# Patient Record
Sex: FEMALE | Race: BLACK OR AFRICAN AMERICAN | NOT HISPANIC OR LATINO | Employment: OTHER | URBAN - METROPOLITAN AREA
[De-identification: names, ages, dates, MRNs, and addresses within clinical notes are randomized per-mention and may not be internally consistent; named-entity substitution may affect disease eponyms.]

---

## 2017-05-24 ENCOUNTER — OFFICE VISIT (OUTPATIENT)
Dept: RHEUMATOLOGY | Facility: CLINIC | Age: 72
End: 2017-05-24
Payer: MEDICARE

## 2017-05-24 VITALS
BODY MASS INDEX: 38.19 KG/M2 | SYSTOLIC BLOOD PRESSURE: 138 MMHG | HEIGHT: 64 IN | DIASTOLIC BLOOD PRESSURE: 82 MMHG | WEIGHT: 223.69 LBS

## 2017-05-24 DIAGNOSIS — Z79.899 ENCOUNTER FOR LONG-TERM CURRENT USE OF MEDICATION: ICD-10-CM

## 2017-05-24 DIAGNOSIS — M15.9 OSTEOARTHRITIS, GENERALIZED: Primary | ICD-10-CM

## 2017-05-24 PROCEDURE — 3075F SYST BP GE 130 - 139MM HG: CPT | Mod: ,,, | Performed by: INTERNAL MEDICINE

## 2017-05-24 PROCEDURE — 1159F MED LIST DOCD IN RCRD: CPT | Mod: ,,, | Performed by: INTERNAL MEDICINE

## 2017-05-24 PROCEDURE — 1160F RVW MEDS BY RX/DR IN RCRD: CPT | Mod: ,,, | Performed by: INTERNAL MEDICINE

## 2017-05-24 PROCEDURE — 3079F DIAST BP 80-89 MM HG: CPT | Mod: ,,, | Performed by: INTERNAL MEDICINE

## 2017-05-24 PROCEDURE — 99213 OFFICE O/P EST LOW 20 MIN: CPT | Mod: 25,,, | Performed by: INTERNAL MEDICINE

## 2017-05-24 PROCEDURE — 20610 DRAIN/INJ JOINT/BURSA W/O US: CPT | Mod: ,,, | Performed by: INTERNAL MEDICINE

## 2017-05-24 PROCEDURE — 1125F AMNT PAIN NOTED PAIN PRSNT: CPT | Mod: ,,, | Performed by: INTERNAL MEDICINE

## 2017-05-24 RX ORDER — DEXAMETHASONE SODIUM PHOSPHATE 4 MG/ML
2 INJECTION, SOLUTION INTRA-ARTICULAR; INTRALESIONAL; INTRAMUSCULAR; INTRAVENOUS; SOFT TISSUE
Status: DISCONTINUED | OUTPATIENT
Start: 2017-05-24 | End: 2017-05-24 | Stop reason: HOSPADM

## 2017-05-24 RX ORDER — DEXAMETHASONE SODIUM PHOSPHATE 4 MG/ML
4 INJECTION, SOLUTION INTRA-ARTICULAR; INTRALESIONAL; INTRAMUSCULAR; INTRAVENOUS; SOFT TISSUE
Status: DISCONTINUED | OUTPATIENT
Start: 2017-05-24 | End: 2017-05-24 | Stop reason: HOSPADM

## 2017-05-24 RX ORDER — TRIAMCINOLONE ACETONIDE 40 MG/ML
40 INJECTION, SUSPENSION INTRA-ARTICULAR; INTRAMUSCULAR
Status: DISCONTINUED | OUTPATIENT
Start: 2017-05-24 | End: 2017-05-24 | Stop reason: HOSPADM

## 2017-05-24 RX ADMIN — TRIAMCINOLONE ACETONIDE 40 MG: 40 INJECTION, SUSPENSION INTRA-ARTICULAR; INTRAMUSCULAR at 10:05

## 2017-05-24 RX ADMIN — DEXAMETHASONE SODIUM PHOSPHATE 4 MG: 4 INJECTION, SOLUTION INTRA-ARTICULAR; INTRALESIONAL; INTRAMUSCULAR; INTRAVENOUS; SOFT TISSUE at 10:05

## 2017-05-24 RX ADMIN — DEXAMETHASONE SODIUM PHOSPHATE 2 MG: 4 INJECTION, SOLUTION INTRA-ARTICULAR; INTRALESIONAL; INTRAMUSCULAR; INTRAVENOUS; SOFT TISSUE at 10:05

## 2017-05-24 NOTE — PROGRESS NOTES
Subjective:       Patient ID: Tierra Taylor is a 71 y.o. female.    Chief Complaint: Disease Management (follow up) and Pain (9/10 pain in both knees)  Follow-up for osteoarthritis.  Patient complaining of pain in both knees in the medial aspect of her knees. No swelling. No fevers. No history of trauma. No other joint complaints. No chest pains, no cough, no shortness of breath or GI complaints. No paresthesias.  HPI  Review of Systems    Objective:      Physical Exam   Constitutional: She is oriented to person, place, and time. She appears well-developed.   Eyes: No scleral icterus.   Neck: Normal range of motion. Neck supple. No thyromegaly present.   Cardiovascular: Normal rate, regular rhythm and normal heart sounds.    Pulmonary/Chest: Effort normal and breath sounds normal.   Abdominal: Soft. Bowel sounds are normal.   Musculoskeletal: She exhibits tenderness. She exhibits no edema.   Marked tenderness in both anserine bursae. No joint swelling.   Neurological: She is alert and oriented to person, place, and time. She exhibits normal muscle tone.   Skin: Skin is warm and dry. No rash noted. No erythema. No pallor.   Psychiatric: She has a normal mood and affect. Judgment normal.       Assessment:       1. Osteoarthritis, generalized    2. Encounter for long-term current use of medication      Bilateral anserine bursitis  Plan:     injected both anserine bursa with 0.5 cc Kenalog and 0.5 cc dexamethasone.  X-ray of both knees. Continue present medications. Follow-up in 4 months if doing okay or before as needed.

## 2017-05-24 NOTE — PROCEDURES
Large Joint Aspiration/Injection  Date/Time: 5/24/2017 10:42 AM  Performed by: KHLOE CRISTOBAL  Authorized by: KHLOE CRISTOBAL     Consent Done?:  Not Needed    Location:  Knee  Site:  R knee and L knee  Prep: Patient was prepped and draped in usual sterile fashion    Medications:  2 mg dexamethasone 4 mg/mL; 40 mg triamcinolone acetonide 40 mg/mL; 4 mg dexamethasone 4 mg/mL (.5 cc)  Patient tolerance:  Patient tolerated the procedure well with no immediate complications      Anserine Bursa injected as above,bilaterally

## 2017-05-24 NOTE — PROCEDURES
Large Joint Aspiration/Injection  Date/Time: 5/24/2017 10:13 AM  Performed by: KHLOE CRISTOBAL  Authorized by: KHLOE CRISTOBAL     Consent Done?:  Not Needed  Prep: Patient was prepped and draped in usual sterile fashion    Medications:  40 mg triamcinolone acetonide 40 mg/mL; 2 mg dexamethasone 4 mg/mL  Patient tolerance:  Patient tolerated the procedure well with no immediate complications

## 2017-05-24 NOTE — LETTER
May 24, 2017      Sofie Leonardo MD  [address]  [address]  [facility]  [place] [address]           [facility]umat[facility]  [address]  [address]  [place] [address]  Phone: [phone]  Fax: [phone]          Patient: Tierra Taylor   MR Number: [id number]   YOB: 1945   Date of Visit: 5/24/2017       Dear Dr. Sofie Leonardo:    Thank you for referring Tierra Taylor to me for evaluation. Attached you will find relevant portions of my assessment and plan of care.    If you have questions, please do not hesitate to call me. I look forward to following Tierra Taylor along with you.    Sincerely,    Cholo Eldridge MD    Enclosure  CC:  No Recipients    If you would like to receive this communication electronically, please contact [email or web address] or [phone] to request more information on [employer] access.    For providers and/or their staff who would like to refer a patient to [facility], please contact us through our one-stop-shop provider referral line, [facility] [place], at [phone].    If you feel you have received this communication in error or would no longer like to receive these types of communications, please e-mail [email or web address]

## 2017-09-27 ENCOUNTER — OFFICE VISIT (OUTPATIENT)
Dept: RHEUMATOLOGY | Facility: CLINIC | Age: 72
End: 2017-09-27
Payer: MEDICARE

## 2017-09-27 VITALS
BODY MASS INDEX: 38.16 KG/M2 | WEIGHT: 222.31 LBS | DIASTOLIC BLOOD PRESSURE: 80 MMHG | SYSTOLIC BLOOD PRESSURE: 120 MMHG

## 2017-09-27 DIAGNOSIS — M15.9 OSTEOARTHRITIS, GENERALIZED: Primary | ICD-10-CM

## 2017-09-27 PROCEDURE — 99213 OFFICE O/P EST LOW 20 MIN: CPT | Mod: ,,, | Performed by: INTERNAL MEDICINE

## 2017-09-27 PROCEDURE — 3079F DIAST BP 80-89 MM HG: CPT | Mod: ,,, | Performed by: INTERNAL MEDICINE

## 2017-09-27 PROCEDURE — 3074F SYST BP LT 130 MM HG: CPT | Mod: ,,, | Performed by: INTERNAL MEDICINE

## 2017-09-27 PROCEDURE — 3008F BODY MASS INDEX DOCD: CPT | Mod: ,,, | Performed by: INTERNAL MEDICINE

## 2017-09-27 PROCEDURE — 1159F MED LIST DOCD IN RCRD: CPT | Mod: ,,, | Performed by: INTERNAL MEDICINE

## 2017-09-27 RX ORDER — DULOXETIN HYDROCHLORIDE 60 MG/1
CAPSULE, DELAYED RELEASE ORAL
COMMUNITY
Start: 2017-01-15 | End: 2019-06-17

## 2017-09-27 NOTE — PROGRESS NOTES
[facility] RHEUMATOLOGY            PROGRESS NOTE      Subjective:       Patient ID:   NAME: Tierra Taylor : 1945     71 y.o. female    Referring Doc: No ref. provider found  Other Physicians:    Chief Complaint:  Osteoarthritis (pain in knees and calf muscles)      History of Present Illness:     Patient returns today for a regularly scheduled follow-up visit for OA  Pain in knees.No swelling  Did not do labs as ordered                 ROS:   GEN:  No  fever, night sweats . weight is stable   + fatigue  SKIN: no rashes, no bruising, no ulcerations, no Raynaud's  HEENT: no HA's, No visual changes, no mucosal ulcers, no sicca symptoms,  CV:   no CP, SOB, PND, NEIL, no orthopnea, no palpitations  PULM: normal with no SOB, cough, hemoptysis, sputum or pleuritic pain  GI:  no abdominal pain, nausea, vomiting, constipation, diarrhea, melanotic stools, BRBPR, hematemesis, no dysphagia  :   no dysuria  NEURO: no paresthesias, headaches, visual disturbances, muscle weakness  MUSCULOSKELETAL:no joint swelling, prolonged AM stiffness, no back pain, no muscle pain  Allergies:  Review of patient's allergies indicates:   Allergen Reactions    Penicillins     Tramadol      causes itching       Medications:    Current Outpatient Prescriptions:     duloxetine (CYMBALTA) 60 MG capsule, Take by mouth., Disp: , Rfl:     aspirin 81 MG Chew, Take 81 mg by mouth once daily., Disp: , Rfl:     carvedilol (COREG) 25 MG tablet, Take 25 mg by mouth 2 (two) times daily with meals., Disp: , Rfl:     diclofenac sodium 2 % SoPk, Apply 2 Pump topically 2 (two) times daily., Disp: 1 packet, Rfl: 3    lisinopril (PRINIVIL,ZESTRIL) 20 MG tablet, Take 20 mg by mouth once daily., Disp: , Rfl:     mometasone (NASONEX) 50 mcg/actuation nasal spray, 2 sprays by Nasal route once daily., Disp: , Rfl:     simvastatin (ZOCOR) 40 MG tablet, Take 40 mg by mouth every evening., Disp: , Rfl:     triamterene-hydrochlorothiazide 75-50 mg  (MAXZIDE) 75-50 mg per tablet, Take 1 tablet by mouth once daily., Disp: , Rfl:     PMHx/PSHx Updates:    Objective:     Vitals:  Blood pressure 120/80, weight 100.8 kg (222 lb 4.8 oz).    Physical Examination:   GEN: no apparent distress, comfortable; AAOx3  SKIN: no rashes,no ulceration, no Raynaud's, no petechiae, no SQ nodules,  HEAD: normal  EYES: no pallor, no icterus,   NECK: no masses, thyroid normal, trachea midline, no LAD/LN's, supple  CV: RRR with no murmur; l S1 and S2 reg. ,no gallop no rubs,   CHEST: Normal respiratory effort; CTAB; normal breath sounds; no wheeze or crackles  ABDOM: nontender and nondistended; soft; no masses; no rebound/guarding  MUSC/Skeletal: ROM normal; no crepitus; joints without synovitis,  no deformities  No joint swelling or tenderness of PIP, MCP, wrist, elbow, shoulder, or knee joints  EXTREM: no clubbing, cyanosis, no edema,normal  pulses   NEURO: grossly intact; motor WNL; AAOx3; , PSYCH: normal mood, affect and behavior  LYMPH: normal cervical, supraclavicular          Labs:   Lab Results   Component Value Date    WBC 8.4 04/28/2013    HGB 12.3 04/28/2013    HCT 38.5 04/28/2013    MCV 96.2 04/28/2013     04/28/2013    CMP  @LASTLAB(NA,K,CL,CO2,GLU,BUN,Creatinine,Calcium,PROT,Albumin,Bilitot,Alkphos,AST,ALT,CRP,ESR,RF,CCP,LEISA,SSA,CPK,uric acid) )@  I have reviewed all available lab results and radiology reports.    Radiology/Diagnostic Studies:        Assessment/Plan:   (1) 71 y.o. female with diagnosis of OA More symptomatic knees,Did not do labs  PLAN: CBC,CMP,  Pennsaid liquid bid prn                Discussion:     I have explained all of the above in detail and the patient understands all of the current recommendation(s). I have answered all questions to the best of my ability and to their complete satisfaction.       The patient is to continue with the current management plan         RTC in   1 month       Electronically signed by Cholo Eldridge  MD

## 2017-10-24 LAB
ALBUMIN SERPL-MCNC: 3.9 G/DL (ref 3.6–5.1)
ALBUMIN/GLOB SERPL: 1.2 (CALC) (ref 1–2.5)
ALP SERPL-CCNC: 74 U/L (ref 33–130)
ALT SERPL-CCNC: 21 U/L (ref 6–29)
AST SERPL-CCNC: 22 U/L (ref 10–35)
BASOPHILS # BLD AUTO: 28 CELLS/UL (ref 0–200)
BASOPHILS NFR BLD AUTO: 0.5 %
BILIRUB SERPL-MCNC: 0.8 MG/DL (ref 0.2–1.2)
BUN SERPL-MCNC: 18 MG/DL (ref 7–25)
BUN/CREAT SERPL: NORMAL (CALC) (ref 6–22)
CALCIUM SERPL-MCNC: 9.3 MG/DL (ref 8.6–10.4)
CHLORIDE SERPL-SCNC: 102 MMOL/L (ref 98–110)
CO2 SERPL-SCNC: 31 MMOL/L (ref 20–31)
CREAT SERPL-MCNC: 0.8 MG/DL (ref 0.6–0.93)
CRP SERPL-MCNC: 2.9 MG/L
EOSINOPHIL # BLD AUTO: 121 CELLS/UL (ref 15–500)
EOSINOPHIL NFR BLD AUTO: 2.2 %
ERYTHROCYTE [DISTWIDTH] IN BLOOD BY AUTOMATED COUNT: 12.3 % (ref 11–15)
GFR SERPL CREATININE-BSD FRML MDRD: 73 ML/MIN/1.73M2
GLOBULIN SER CALC-MCNC: 3.2 G/DL (CALC) (ref 1.9–3.7)
GLUCOSE SERPL-MCNC: 90 MG/DL (ref 65–99)
HCT VFR BLD AUTO: 39.1 % (ref 35–45)
HGB BLD-MCNC: 12.7 G/DL (ref 11.7–15.5)
LYMPHOCYTES # BLD AUTO: 2151 CELLS/UL (ref 850–3900)
LYMPHOCYTES NFR BLD AUTO: 39.1 %
MCH RBC QN AUTO: 29.6 PG (ref 27–33)
MCHC RBC AUTO-ENTMCNC: 32.5 G/DL (ref 32–36)
MCV RBC AUTO: 91.1 FL (ref 80–100)
MONOCYTES # BLD AUTO: 550 CELLS/UL (ref 200–950)
MONOCYTES NFR BLD AUTO: 10 %
NEUTROPHILS # BLD AUTO: 2651 CELLS/UL (ref 1500–7800)
NEUTROPHILS NFR BLD AUTO: 48.2 %
PLATELET # BLD AUTO: 225 THOUSAND/UL (ref 140–400)
PMV BLD REES-ECKER: 11.6 FL (ref 7.5–12.5)
POTASSIUM SERPL-SCNC: 3.6 MMOL/L (ref 3.5–5.3)
PROT SERPL-MCNC: 7.1 G/DL (ref 6.1–8.1)
RBC # BLD AUTO: 4.29 MILLION/UL (ref 3.8–5.1)
SODIUM SERPL-SCNC: 141 MMOL/L (ref 135–146)
WBC # BLD AUTO: 5.5 THOUSAND/UL (ref 3.8–10.8)

## 2017-10-25 ENCOUNTER — OFFICE VISIT (OUTPATIENT)
Dept: RHEUMATOLOGY | Facility: CLINIC | Age: 72
End: 2017-10-25
Payer: MEDICARE

## 2017-10-25 VITALS
WEIGHT: 218 LBS | BODY MASS INDEX: 37.22 KG/M2 | HEIGHT: 64 IN | SYSTOLIC BLOOD PRESSURE: 162 MMHG | DIASTOLIC BLOOD PRESSURE: 68 MMHG

## 2017-10-25 DIAGNOSIS — M15.9 OSTEOARTHRITIS, GENERALIZED: Primary | ICD-10-CM

## 2017-10-25 PROCEDURE — 99212 OFFICE O/P EST SF 10 MIN: CPT | Mod: ,,, | Performed by: INTERNAL MEDICINE

## 2017-10-25 RX ORDER — SALSALATE 500 MG/1
TABLET, FILM COATED ORAL
Qty: 120 TABLET | Refills: 0 | Status: SHIPPED | OUTPATIENT
Start: 2017-10-25 | End: 2019-06-17

## 2017-10-25 NOTE — PROGRESS NOTES
[facility] RHEUMATOLOGY            PROGRESS NOTE      Subjective:       Patient ID:   NAME: Tierra Taylor : 1945     71 y.o. female    Referring Doc: No ref. provider found  Other Physicians:    Chief Complaint:  No chief complaint on file.      History of Present Illness:     Patient returns today for a regularly scheduled follow-up visit for    OA   The patient has same complaints of knee pain  Did not get Pennsaid due to the price  No new complaints  No respiratory or GI complaints            ROS:   GEN:  No  fever, night sweats . weight is stable   No fatigue  SKIN: no rashes, no bruising, no ulcerations, no Raynaud's  HEENT: no HA's, No visual changes, no mucosal ulcers, no sicca symptoms,  CV:   no CP, SOB, PND, NEIL, no orthopnea, no palpitations  PULM: normal with no SOB, cough, hemoptysis, sputum or pleuritic pain  GI:  no abdominal pain, nausea, vomiting, constipation, diarrhea, melanotic stools, BRBPR, hematemesis, no dysphagia  :   no dysuria  NEURO: no paresthesias, headaches, visual disturbances, muscle weakness  MUSCULOSKELETAL:no joint swelling, prolonged AM stiffness, no back pain, no muscle pain  Allergies:  Review of patient's allergies indicates:   Allergen Reactions    Penicillins     Tramadol      causes itching       Medications:    Current Outpatient Prescriptions:     aspirin 81 MG Chew, Take 81 mg by mouth once daily., Disp: , Rfl:     carvedilol (COREG) 25 MG tablet, Take 25 mg by mouth 2 (two) times daily with meals., Disp: , Rfl:     diclofenac sodium 2 % SoPk, Apply 2 Pump topically 2 (two) times daily., Disp: 1 packet, Rfl: 3    duloxetine (CYMBALTA) 60 MG capsule, Take by mouth., Disp: , Rfl:     lisinopril (PRINIVIL,ZESTRIL) 20 MG tablet, Take 20 mg by mouth once daily., Disp: , Rfl:     mometasone (NASONEX) 50 mcg/actuation nasal spray, 2 sprays by Nasal route once daily., Disp: , Rfl:     simvastatin (ZOCOR) 40 MG tablet, Take 40 mg by mouth every evening.,  "Disp: , Rfl:     triamterene-hydrochlorothiazide 75-50 mg (MAXZIDE) 75-50 mg per tablet, Take 1 tablet by mouth once daily., Disp: , Rfl:     PMHx/PSHx Updates:      Objective:     Vitals:  Blood pressure (!) 162/68, height 5' 4" (1.626 m), weight 98.9 kg (218 lb).    Physical Examination:   GEN: no apparent distress, comfortable; AAOx3  SKIN: no rashes,no ulceration, no Raynaud's, no petechiae, no SQ nodules,  HEAD: normal  EYES: no pallor, no icterus,  NECK: no masses, thyroid normal, trachea midline, no LAD/LN's, supple  CV: RRR with no murmur; l S1 and S2 reg. ,no gallop no rubs,   CHEST: Normal respiratory effort; CTAB; normal breath sounds; no wheeze or crackles  MUSC/Skeletal: ROM normal; no crepitus; joints without synovitis,  no deformities  No joint swelling or tenderness of PIP, MCP, wrist, elbow, shoulder, or knee joints  EXTREM: no clubbing, cyanosis, no edema,normal  pulses   NEURO: grossly intact; motor WNL; AAOx3;  PSYCH: normal mood, affect and behavior  LYMPH: normal cervical, supraclavicular          Labs:   Lab Results   Component Value Date    WBC 8.4 04/28/2013    HGB 12.3 04/28/2013    HCT 38.5 04/28/2013    MCV 96.2 04/28/2013     04/28/2013    CMP  @LASTLAB(NA,K,CL,CO2,GLU,BUN,Creatinine,Calcium,PROT,Albumin,Bilitot,Alkphos,AST,ALT,CRP,ESR,RF,CCP,LEISA,SSA,CPK,uric acid) )@  I have reviewed all available lab results and radiology reports.    Radiology/Diagnostic Studies:    CBC,CMP,CRP from 10/23/2017:WNL    Assessment/Plan:   (1) 71 y.o. female with diagnosis of Osteoarthritis,mainly in knees    PLAN: Salsalate 500 mg : 2 BID-TID pc prn            Discussion:     I have explained all of the above in detail and the patient understands all of the current recommendation(s). I have answered all questions to the best of my ability and to their complete satisfaction.       The patient is to continue with the current management plan         RTC in  One month       Electronically signed by " Cholo Eldridge MD

## 2017-11-28 ENCOUNTER — OFFICE VISIT (OUTPATIENT)
Dept: RHEUMATOLOGY | Facility: CLINIC | Age: 72
End: 2017-11-28
Payer: MEDICARE

## 2017-11-28 VITALS
BODY MASS INDEX: 37.73 KG/M2 | HEIGHT: 64 IN | WEIGHT: 221 LBS | DIASTOLIC BLOOD PRESSURE: 88 MMHG | SYSTOLIC BLOOD PRESSURE: 160 MMHG

## 2017-11-28 DIAGNOSIS — M15.9 PRIMARY OSTEOARTHRITIS INVOLVING MULTIPLE JOINTS: Primary | ICD-10-CM

## 2017-11-28 PROCEDURE — 99213 OFFICE O/P EST LOW 20 MIN: CPT | Mod: 25,,, | Performed by: INTERNAL MEDICINE

## 2017-11-28 PROCEDURE — 20610 DRAIN/INJ JOINT/BURSA W/O US: CPT | Mod: LT,,, | Performed by: INTERNAL MEDICINE

## 2017-11-28 RX ORDER — DEXAMETHASONE SODIUM PHOSPHATE 4 MG/ML
2 INJECTION, SOLUTION INTRA-ARTICULAR; INTRALESIONAL; INTRAMUSCULAR; INTRAVENOUS; SOFT TISSUE
Status: DISCONTINUED | OUTPATIENT
Start: 2017-11-28 | End: 2017-11-28 | Stop reason: HOSPADM

## 2017-11-28 RX ORDER — TRIAMCINOLONE ACETONIDE 40 MG/ML
40 INJECTION, SUSPENSION INTRA-ARTICULAR; INTRAMUSCULAR
Status: DISCONTINUED | OUTPATIENT
Start: 2017-11-28 | End: 2017-11-28 | Stop reason: HOSPADM

## 2017-11-28 RX ADMIN — DEXAMETHASONE SODIUM PHOSPHATE 2 MG: 4 INJECTION, SOLUTION INTRA-ARTICULAR; INTRALESIONAL; INTRAMUSCULAR; INTRAVENOUS; SOFT TISSUE at 09:11

## 2017-11-28 RX ADMIN — TRIAMCINOLONE ACETONIDE 40 MG: 40 INJECTION, SUSPENSION INTRA-ARTICULAR; INTRAMUSCULAR at 09:11

## 2017-11-28 NOTE — PROCEDURES
Large Joint Aspiration/Injection  Date/Time: 11/28/2017 9:51 AM  Performed by: KHLOE CRISTOBAL  Authorized by: KHLOE CRISTOBAL     Consent Done?:  Not Needed  Indications:  Pain    Location:  Knee  Site:  L knee  Prep: Patient was prepped and draped in usual sterile fashion    Medications:  40 mg triamcinolone acetonide 40 mg/mL; 2 mg dexamethasone 4 mg/mL  Patient tolerance:  Patient tolerated the procedure well with no immediate complications

## 2017-11-28 NOTE — PROGRESS NOTES
[facility] RHEUMATOLOGY            PROGRESS NOTE      Subjective:       Patient ID:   NAME: Tierra Taylor : 1945     72 y.o. female    Referring Doc: No ref. provider found  Other Physicians:    Chief Complaint:  No chief complaint on file.      History of Present Illness:     Patient returns today for a regularly scheduled follow-up visit for  Osteoarthritis     The patient continues with pain in both knees left more than right. No fevers cough or shortness of breath. No gastrointestinal complaints. Patient states she gets more relief with Aleve than Salsalate.            ROS:   GEN:  No  fever, night sweats . weight is stable   some fatigue  SKIN: no rashes, no bruising, no ulcerations, no Raynaud's  HEENT: no HA's, No visual changes, no mucosal ulcers, no sicca symptoms,  CV:   no CP, SOB, PND, NEIL, no orthopnea, no palpitations  PULM: normal with no SOB, cough, hemoptysis, sputum or pleuritic pain  GI:  no abdominal pain, nausea, vomiting, constipation, diarrhea, melanotic stools, BRBPR, hematemesis, no dysphagia  :   no dysuria  NEURO: no paresthesias, headaches, visual disturbances, muscle weakness  MUSCULOSKELETAL:no joint swelling, prolonged AM stiffness, no back pain, no muscle pain  Allergies:  Review of patient's allergies indicates:   Allergen Reactions    Penicillins     Tramadol      causes itching       Medications:    Current Outpatient Prescriptions:     aspirin 81 MG Chew, Take 81 mg by mouth once daily., Disp: , Rfl:     carvedilol (COREG) 25 MG tablet, Take 25 mg by mouth 2 (two) times daily with meals., Disp: , Rfl:     diclofenac sodium 2 % SoPk, Apply 2 Pump topically 2 (two) times daily., Disp: 1 packet, Rfl: 3    duloxetine (CYMBALTA) 60 MG capsule, Take by mouth., Disp: , Rfl:     lisinopril (PRINIVIL,ZESTRIL) 20 MG tablet, Take 20 mg by mouth once daily., Disp: , Rfl:     mometasone (NASONEX) 50 mcg/actuation nasal spray, 2 sprays by Nasal route once daily., Disp:  ", Rfl:     salsalate (DISALCID) 500 MG Tab, 2 tablets bid-tid pc prn, Disp: 120 tablet, Rfl: 0    simvastatin (ZOCOR) 40 MG tablet, Take 40 mg by mouth every evening., Disp: , Rfl:     triamterene-hydrochlorothiazide 75-50 mg (MAXZIDE) 75-50 mg per tablet, Take 1 tablet by mouth once daily., Disp: , Rfl:     PMHx/PSHx Updates    Objective:     Vitals:  Blood pressure (!) 160/88, height 5' 4" (1.626 m), weight 100.2 kg (221 lb).    Physical Examination:   GEN: no apparent distress, comfortable; AAOx3  SKIN: no rashes,no ulceration, no Raynaud's, no petechiae, no SQ nodules,  HEAD: normal  EYES: no pallor, no icterus,  NECK: no masses, thyroid normal, trachea midline, no LAD/LN's, supple  CV: RRR with no murmur; l S1 and S2 reg. ,no gallop no rubs,   CHEST: Normal respiratory effort; CTAB; normal breath sounds; no wheeze or crackles  ABDOM: nontender and nondistended; soft; no masses; no rebound/guarding  MUSC/Skeletal: ROM normal; no crepitus; joints without synovitis,  no deformities  No joint swelling or tenderness of PIP, MCP, wrist, elbow, shoulder, or knee joints  EXTREM: no clubbing, cyanosis, no edema,normal  pulses   NEURO: grossly intact; motor WNL; AAOx3; ,  PSYCH: normal mood, affect and behavior  LYMPH: normal cervical, supraclavicular          Labs:   Lab Results   Component Value Date    WBC 5.5 10/23/2017    HGB 12.7 10/23/2017    HCT 39.1 10/23/2017    MCV 91.1 10/23/2017     10/23/2017    CMP  @LASTLAB(NA,K,CL,CO2,GLU,BUN,Creatinine,Calcium,PROT,Albumin,Bilitot,Alkphos,AST,ALT,CRP,ESR,RF,CCP,LEISA,SSA,CPK,uric acid) )@  I have reviewed all available lab results and radiology reports.    Radiology/Diagnostic Studies:        Assessment/Plan:   (1) 72 y.o. female with diagnosis of Osteoarthritis of both knees. She has bone-on-bone on the medial aspect of both knees  Injected left knee with a lateral approach as per procedure note  She can take Aleve instead of the salsalate and monitor for fluid " retention  2) blood pressure is elevated. Patient states she did not take her blood pressure medication this morning. She will take it when she gets home and will monitor her blood pressure; if it remains elevated she will contact her primary care physician        Discussion:     I have explained all of the above in detail and the patient understands all of the current recommendation(s). I have answered all questions to the best of my ability and to their complete satisfaction.       The patient is to continue with the current management plan         RTC in  4 months or before when necessary       Electronically signed by Cholo Eldridge MD

## 2018-03-27 ENCOUNTER — OFFICE VISIT (OUTPATIENT)
Dept: RHEUMATOLOGY | Facility: CLINIC | Age: 73
End: 2018-03-27
Payer: MEDICARE

## 2018-03-27 VITALS
DIASTOLIC BLOOD PRESSURE: 98 MMHG | BODY MASS INDEX: 38.24 KG/M2 | WEIGHT: 222.81 LBS | SYSTOLIC BLOOD PRESSURE: 158 MMHG

## 2018-03-27 DIAGNOSIS — M19.90 ACUTE ARTHRITIS: ICD-10-CM

## 2018-03-27 DIAGNOSIS — M15.9 PRIMARY OSTEOARTHRITIS INVOLVING MULTIPLE JOINTS: Primary | ICD-10-CM

## 2018-03-27 PROCEDURE — 3080F DIAST BP >= 90 MM HG: CPT | Mod: ,,, | Performed by: INTERNAL MEDICINE

## 2018-03-27 PROCEDURE — 99213 OFFICE O/P EST LOW 20 MIN: CPT | Mod: 25,,, | Performed by: INTERNAL MEDICINE

## 2018-03-27 PROCEDURE — 20610 DRAIN/INJ JOINT/BURSA W/O US: CPT | Mod: LT,,, | Performed by: INTERNAL MEDICINE

## 2018-03-27 PROCEDURE — 3077F SYST BP >= 140 MM HG: CPT | Mod: ,,, | Performed by: INTERNAL MEDICINE

## 2018-03-27 RX ORDER — DEXAMETHASONE SODIUM PHOSPHATE 4 MG/ML
2 INJECTION, SOLUTION INTRA-ARTICULAR; INTRALESIONAL; INTRAMUSCULAR; INTRAVENOUS; SOFT TISSUE
Status: DISCONTINUED | OUTPATIENT
Start: 2018-03-27 | End: 2018-03-27 | Stop reason: HOSPADM

## 2018-03-27 RX ORDER — TRIAMCINOLONE ACETONIDE 40 MG/ML
40 INJECTION, SUSPENSION INTRA-ARTICULAR; INTRAMUSCULAR
Status: DISCONTINUED | OUTPATIENT
Start: 2018-03-27 | End: 2018-03-27 | Stop reason: HOSPADM

## 2018-03-27 RX ORDER — AMLODIPINE BESYLATE 5 MG/1
TABLET ORAL
COMMUNITY
Start: 2018-01-08 | End: 2019-06-17

## 2018-03-27 RX ORDER — SIMVASTATIN 20 MG/1
TABLET, FILM COATED ORAL
COMMUNITY
Start: 2018-01-08 | End: 2019-06-17

## 2018-03-27 RX ORDER — CARVEDILOL 12.5 MG/1
12.5 TABLET ORAL 2 TIMES DAILY
COMMUNITY
Start: 2018-01-08 | End: 2020-07-16 | Stop reason: SDUPTHER

## 2018-03-27 RX ORDER — HYDROCHLOROTHIAZIDE 25 MG/1
25 TABLET ORAL NIGHTLY
COMMUNITY
Start: 2018-01-08 | End: 2020-03-27 | Stop reason: SDUPTHER

## 2018-03-27 RX ADMIN — DEXAMETHASONE SODIUM PHOSPHATE 2 MG: 4 INJECTION, SOLUTION INTRA-ARTICULAR; INTRALESIONAL; INTRAMUSCULAR; INTRAVENOUS; SOFT TISSUE at 09:03

## 2018-03-27 RX ADMIN — TRIAMCINOLONE ACETONIDE 40 MG: 40 INJECTION, SUSPENSION INTRA-ARTICULAR; INTRAMUSCULAR at 09:03

## 2018-03-27 NOTE — PROCEDURES
Large Joint Aspiration/Injection  Date/Time: 3/27/2018 9:47 AM  Performed by: KHLOE CRISTOBAL  Authorized by: KHLOE CRISTOBAL     Consent Done?:  Not Needed  Indications:  Pain    Location:  Knee  Site:  L knee  Approach:  Lateral  Medications:  40 mg triamcinolone acetonide 40 mg/mL; 2 mg dexamethasone 4 mg/mL  Patient tolerance:  Patient tolerated the procedure well with no immediate complications

## 2018-03-27 NOTE — PROGRESS NOTES
[facility] RHEUMATOLOGY            PROGRESS NOTE      Subjective:       Patient ID:   NAME: Tierra Taylor : 1945     72 y.o. female    Referring Doc: No ref. provider found  Other Physicians:    Chief Complaint:  Osteoarthritis (Bilateral Knee Pain)      History of Present Illness:     Patient returns today for a regularly scheduled follow-up visit for   Osteoarthritis    The patient is complaining of left knee pain. Left knee was injected on her last visit in October with resolution of symptoms last thin until recently. No recent trauma. No fevers, cough. shortness of breath no chest pains.            ROS:   GEN:  No  fever, night sweats . weight is stable   + fatigue  SKIN: no rashes, no bruising, no ulcerations, no Raynaud's  HEENT: no HA's, No visual changes, no mucosal ulcers, no sicca symptoms,  CV:   no CP, SOB, PND, NEIL, no orthopnea, no palpitations  PULM: normal with no SOB, cough, hemoptysis, sputum or pleuritic pain  GI:  no abdominal pain, nausea, vomiting, constipation, diarrhea, melanotic stools, BRBPR, hematemesis, no dysphagia  :   no dysuria  NEURO: no paresthesias, headaches, visual disturbances, muscle weakness  MUSCULOSKELETAL:no joint swelling, prolonged AM stiffness, no back pain, no muscle pain  Allergies:  Review of patient's allergies indicates:   Allergen Reactions    Penicillins     Tramadol      causes itching       Medications:    Current Outpatient Prescriptions:     amLODIPine (NORVASC) 5 MG tablet, , Disp: , Rfl:     aspirin 81 MG Chew, Take 81 mg by mouth once daily., Disp: , Rfl:     carvedilol (COREG) 12.5 MG tablet, , Disp: , Rfl:     diclofenac sodium 2 % SoPk, Apply 2 Pump topically 2 (two) times daily., Disp: 1 packet, Rfl: 3    duloxetine (CYMBALTA) 60 MG capsule, Take by mouth., Disp: , Rfl:     hydroCHLOROthiazide (HYDRODIURIL) 25 MG tablet, , Disp: , Rfl:     lisinopril (PRINIVIL,ZESTRIL) 20 MG tablet, Take 20 mg by mouth once daily., Disp: , Rfl:      mometasone (NASONEX) 50 mcg/actuation nasal spray, 2 sprays by Nasal route once daily., Disp: , Rfl:     salsalate (DISALCID) 500 MG Tab, 2 tablets bid-tid pc prn, Disp: 120 tablet, Rfl: 0    simvastatin (ZOCOR) 20 MG tablet, , Disp: , Rfl:     PMHx/PSHx Updates:          Objective:     Vitals:  Blood pressure (!) 158/98, weight 101.1 kg (222 lb 12.8 oz).    Physical Examination:   GEN: no apparent distress, comfortable; AAOx3  SKIN: no rashes,no ulceration, no Raynaud's, no petechiae, no SQ nodules,  HEAD: normal  EYES: no pallor, no icterus,  NECK: no masses, thyroid normal, trachea midline, no LAD/LN's, supple  CV: RRR with no murmur; l S1 and S2 reg. ,no gallop no rubs,   CHEST: Normal respiratory effort; CTAB; normal breath sounds; no wheeze or crackles  MUSC/Skeletal: ROM normal; no crepitus; joints without synovitis,  no deformities  No joint swelling or tenderness of PIP, MCP, wrist, elbow, shoulder, Schmitt knee joints. Tiny effusion on the left knee. No erythema.  EXTREM: no clubbing, cyanosis, no edema,normal  pulses   NEURO: grossly intact; motor WNL; AAOx3; ,  PSYCH: normal mood, affect and behavior  LYMPH: normal cervical, supraclavicular          Labs:   Lab Results   Component Value Date    WBC 5.5 10/23/2017    HGB 12.7 10/23/2017    HCT 39.1 10/23/2017    MCV 91.1 10/23/2017     10/23/2017    CMP  @LASTLAB(NA,K,CL,CO2,GLU,BUN,Creatinine,Calcium,PROT,Albumin,Bilitot,Alkphos,AST,ALT,CRP,ESR,RF,CCP,LEISA,SSA,CPK,uric acid) )@  I have reviewed all available lab results and radiology reports.    Radiology/Diagnostic Studies:        Assessment/Plan:   (1) 72 y.o. female with diagnosis of Osteoarthritis.  2) acute arthritis on the left knee. This was injected as per procedure note      Recently had  blood work for her primary care physician (2 months ago.) She was told all tests were normal.          Discussion:     I have explained all of the above in detail and the patient understands all of the  current recommendation(s). I have answered all questions to the best of my ability and to their complete satisfaction.       The patient is to continue with the current management plan         RTC in   4 months or before if needed      Electronically signed by Cholo Eldridge MD

## 2018-04-09 ENCOUNTER — HOSPITAL ENCOUNTER (EMERGENCY)
Facility: HOSPITAL | Age: 73
Discharge: HOME | End: 2018-04-09
Attending: EMERGENCY MEDICINE
Payer: MEDICARE

## 2018-04-09 VITALS
SYSTOLIC BLOOD PRESSURE: 165 MMHG | BODY MASS INDEX: 36.88 KG/M2 | DIASTOLIC BLOOD PRESSURE: 77 MMHG | HEIGHT: 64 IN | HEART RATE: 74 BPM | TEMPERATURE: 99 F | WEIGHT: 216 LBS | RESPIRATION RATE: 14 BRPM | OXYGEN SATURATION: 95 %

## 2018-04-09 DIAGNOSIS — M10.9 ACUTE GOUT INVOLVING TOE OF LEFT FOOT, UNSPECIFIED CAUSE: Primary | ICD-10-CM

## 2018-04-09 PROCEDURE — 99283 EMERGENCY DEPT VISIT LOW MDM: CPT

## 2018-04-09 RX ORDER — METHYLPREDNISOLONE 4 MG/1
TABLET ORAL
Qty: 1 PACKAGE | Refills: 0 | Status: SHIPPED | OUTPATIENT
Start: 2018-04-09 | End: 2018-04-30

## 2018-04-09 RX ORDER — HYDROCODONE BITARTRATE AND ACETAMINOPHEN 5; 325 MG/1; MG/1
1 TABLET ORAL EVERY 8 HOURS PRN
Qty: 12 TABLET | Refills: 0 | OUTPATIENT
Start: 2018-04-09 | End: 2019-05-17

## 2018-04-09 NOTE — DISCHARGE INSTRUCTIONS
Take steroids as prescribed.  You can take aleve and if that doesn't work you can take the prescribed pain medication. Just be careful as it can make your drowsy.  See your primary care provider in one week and with rheumatology.  For worsening symptoms, chest pain, shortness of breath, increased abdominal pain, high grade fever, stroke or stroke like symptoms, immediately go to the nearest Emergency Room or call 911 as soon as possible.

## 2018-04-09 NOTE — ED PROVIDER NOTES
"Encounter Date: 4/9/2018    SCRIBE #1 NOTE: I, Steven Andree, am scribing for, and in the presence of, Deanne Romeo PA-C.       History     Chief Complaint   Patient presents with    Toe Pain     L great toe pain since Friday.       04/09/2018 12:20 PM     Chief complaint: Toe Pain       Tierra Taylor is a 72 y.o. female with a PMHx of HTN and arthritis who presents to the ED with left great toe pain with onset Friday (04/06). Patient states that she awoke with this pain. She relays that she is having increased sensitivity in that toe, stating "I can't even keep a sheet over that toe." Patient reports that the pain is sharp. She relays that she is able to ambulate on the foot secondary to some mild pain. She denies loss ROM. She does admit to having swelling to the toe. Patient denies fever, warmth, other area's of swelling, and erythema. She also denies heavy alcohol use and consuming excessive amounts of red meat.         The history is provided by the patient.     Review of patient's allergies indicates:   Allergen Reactions    Penicillins     Tramadol      causes itching     Past Medical History:   Diagnosis Date    Arthritis     Hypertension      History reviewed. No pertinent surgical history.  History reviewed. No pertinent family history.  Social History   Substance Use Topics    Smoking status: Never Smoker    Smokeless tobacco: Never Used    Alcohol use No     Review of Systems   Constitutional: Negative for activity change, appetite change, chills and fever.   HENT: Negative for congestion, rhinorrhea and sore throat.    Eyes: Negative for redness and visual disturbance.   Respiratory: Negative for cough, chest tightness and shortness of breath.    Cardiovascular: Negative for chest pain.   Gastrointestinal: Negative for abdominal pain, diarrhea, nausea and vomiting.   Genitourinary: Negative for dysuria and frequency.   Musculoskeletal: Positive for arthralgias (toe pain ) and joint " swelling (toe ). Negative for back pain, neck pain and neck stiffness.   Skin: Negative for color change and rash.   Neurological: Negative for dizziness, syncope, numbness and headaches.   All other systems reviewed and are negative.      Physical Exam     Initial Vitals [04/09/18 1155]   BP Pulse Resp Temp SpO2   (!) 165/77 74 14 98.5 °F (36.9 °C) 95 %      MAP       106.33         Physical Exam    Nursing note and vitals reviewed.  Constitutional: Vital signs are normal. She appears well-developed and well-nourished. She is cooperative.  Non-toxic appearance. She does not have a sickly appearance.   HENT:   Head: Normocephalic and atraumatic.   Right Ear: External ear normal.   Left Ear: External ear normal.   Nose: Nose normal.   Mouth/Throat: Oropharynx is clear and moist.   Eyes: Conjunctivae and lids are normal. Pupils are equal, round, and reactive to light.   Neck: Normal range of motion and full passive range of motion without pain. Neck supple.   Cardiovascular: Normal rate, regular rhythm and normal heart sounds. Exam reveals no gallop and no friction rub.    No murmur heard.  Pulmonary/Chest: Breath sounds normal. She has no wheezes. She has no rhonchi. She has no rales.   Abdominal: Soft. Normal appearance. There is no tenderness. There is no rigidity, no rebound and no guarding.   Musculoskeletal: Normal range of motion.        Left foot: There is tenderness and swelling. There is normal capillary refill.        Feet:    Tenderness to palpation and minimal swelling to the 1st MTP joint.   Neurovascularly intact. Good DP pulse.   No significant erythema or warmth.    Neurological: She is alert and oriented to person, place, and time.   Skin: Skin is warm, dry and intact. No rash noted.         ED Course   Procedures  Labs Reviewed - No data to display     Imaging Results    None            Medical Decision Making:   History:   Old Medical Records: I decided to obtain old medical records.             Scribe Attestation:   Scribe #1: I performed the above scribed service and the documentation accurately describes the services I performed. I attest to the accuracy of the note.    Attending Attestation:     Physician Attestation Statement for NP/PA:   I have conducted a face to face encounter with this patient in addition to the NP/PA, due to NP/PA Request    Other NP/PA Attestation Additions:      Medical Decision Making: Tierra Taylor is a 72 y.o. female presenting with left first MTP pain marked by 4 active range of motion but with pain and tenderness to palpation on examination.  No edema or deformity.  No discernible joint effusion.  Minimal erythema with no increased warmth.  There is no history of trauma and I doubt occult fracture.  Intact sensation in the distal feet including the first toe bilaterally to light touch.  Intact distal motor function in Refill as well bilaterally.  2+ DP PT pulses.  I doubt occult fracture.  Very low suspicion for septic joint.  I do not think transfer for joint arthrocentesis or antibodies are indicated.  I suspect possible gouty arthritis with short course of prednisone in addition analgesia initiated here.  Follow-up with PCP and rheumatology.  Return precautions reviewed.           I, Deanne Romeo PA-C, personally performed the services described in this documentation. All medical record entries made by the scribe were at my direction and in my presence.  I have reviewed the chart and agree that the record reflects my personal performance and is accurate and complete. Deanne Romeo PA-C.  5:13 PM 04/09/2018             Clinical Impression:   The encounter diagnosis was Acute gout involving toe of left foot, unspecified cause.    Disposition:   Disposition: Discharged  Condition: Stable                        Deanne Romeo PA-C  04/09/18 0202

## 2018-04-10 ENCOUNTER — PES CALL (OUTPATIENT)
Dept: ADMINISTRATIVE | Facility: CLINIC | Age: 73
End: 2018-04-10

## 2018-09-10 ENCOUNTER — OFFICE VISIT (OUTPATIENT)
Dept: RHEUMATOLOGY | Facility: CLINIC | Age: 73
End: 2018-09-10
Payer: MEDICARE

## 2018-09-10 VITALS
SYSTOLIC BLOOD PRESSURE: 132 MMHG | WEIGHT: 219.13 LBS | DIASTOLIC BLOOD PRESSURE: 74 MMHG | BODY MASS INDEX: 37.61 KG/M2

## 2018-09-10 DIAGNOSIS — M15.9 PRIMARY OSTEOARTHRITIS INVOLVING MULTIPLE JOINTS: Primary | ICD-10-CM

## 2018-09-10 PROCEDURE — 3078F DIAST BP <80 MM HG: CPT | Mod: ,,, | Performed by: INTERNAL MEDICINE

## 2018-09-10 PROCEDURE — 99213 OFFICE O/P EST LOW 20 MIN: CPT | Mod: ,,, | Performed by: INTERNAL MEDICINE

## 2018-09-10 PROCEDURE — 3075F SYST BP GE 130 - 139MM HG: CPT | Mod: ,,, | Performed by: INTERNAL MEDICINE

## 2018-09-10 RX ORDER — ALLOPURINOL 100 MG/1
100 TABLET ORAL NIGHTLY
COMMUNITY
Start: 2018-08-01 | End: 2020-03-27 | Stop reason: SDUPTHER

## 2018-09-10 NOTE — PROGRESS NOTES
[facility] RHEUMATOLOGY            PROGRESS NOTE      Subjective:       Patient ID:   NAME: Tierra Taylor : 1945     72 y.o. female    Referring Doc: No ref. provider found  Other Physicians:    Chief Complaint:  Osteoarthritis (patient not taking allopurinol- )      History of Present Illness:     Patient returns today for a regularly scheduled follow-up visit for  osteoarthritis     The patient last seen 6 months ago when left knee was injected. She is not complaining of any Knee  pain or swelling. She has occasional myalgias but no muscle weakness.            ROS:   GEN:  No  fever, night sweats . weight is stable   No fatigue  SKIN: no rashes, no bruising, no ulcerations, no Raynaud's  HEENT: no HA's, No visual changes, no mucosal ulcers, no sicca symptoms,  CV:   no CP, SOB, PND, NEIL, no orthopnea, no palpitations  PULM: normal with no SOB, cough, hemoptysis, sputum or pleuritic pain  GI:  no abdominal pain, nausea, vomiting, constipation, diarrhea, melanotic stools, BRBPR, hematemesis, no dysphagia  :   no dysuria  NEURO: no paresthesias, headaches, visual disturbances, muscle weakness  MUSCULOSKELETAL:no joint swelling, prolonged AM stiffness, no back pain, + occ muscle pain  Allergies:  Review of patient's allergies indicates:   Allergen Reactions    Penicillins     Tramadol      causes itching       Medications:    Current Outpatient Medications:     amLODIPine (NORVASC) 5 MG tablet, , Disp: , Rfl:     aspirin 81 MG Chew, Take 81 mg by mouth once daily., Disp: , Rfl:     carvedilol (COREG) 12.5 MG tablet, , Disp: , Rfl:     diclofenac sodium 2 % SoPk, Apply 2 Pump topically 2 (two) times daily., Disp: 1 packet, Rfl: 3    duloxetine (CYMBALTA) 60 MG capsule, Take by mouth., Disp: , Rfl:     hydroCHLOROthiazide (HYDRODIURIL) 25 MG tablet, , Disp: , Rfl:     hydrocodone-acetaminophen 5-325mg (NORCO) 5-325 mg per tablet, Take 1 tablet by mouth every 8 (eight) hours as needed for Pain.,  Disp: 12 tablet, Rfl: 0    lisinopril (PRINIVIL,ZESTRIL) 20 MG tablet, Take 20 mg by mouth once daily., Disp: , Rfl:     mometasone (NASONEX) 50 mcg/actuation nasal spray, 2 sprays by Nasal route once daily., Disp: , Rfl:     salsalate (DISALCID) 500 MG Tab, 2 tablets bid-tid pc prn, Disp: 120 tablet, Rfl: 0    simvastatin (ZOCOR) 20 MG tablet, , Disp: , Rfl:     allopurinol (ZYLOPRIM) 100 MG tablet, 100 mg., Disp: , Rfl:     PMHx/PSHx Updates:        Objective:     Vitals:  Blood pressure 132/74, weight 99.4 kg (219 lb 1.6 oz).    Physical Examination:   GEN: no apparent distress, comfortable; AAOx3  SKIN: no rashes,no ulceration, no Raynaud's, no petechiae, no SQ nodules,  HEAD: normal  EYES: no pallor, no icterus,  NECK: no masses, thyroid normal, trachea midline, no LAD/LN's, supple  CV: RRR with no murmur; l S1 and S2 reg. ,no gallop no rubs,   CHEST: Normal respiratory effort; CTAB; normal breath sounds; no wheeze or crackles  MUSC/Skeletal: ROM normal; no crepitus; joints without synovitis,  no deformities  No joint swelling or tenderness of PIP, MCP, wrist, elbow, shoulder, or knee joints  EXTREM: no clubbing, cyanosis, no edema,normal  pulses   NEURO: grossly intact; motor WNL; AAOx3; ,   PSYCH: normal mood, affect and behavior  LYMPH: normal cervical, supraclavicular          Labs:   Lab Results   Component Value Date    WBC 5.5 10/23/2017    HGB 12.7 10/23/2017    HCT 39.1 10/23/2017    MCV 91.1 10/23/2017     10/23/2017    CMP  @LASTLAB(NA,K,CL,CO2,GLU,BUN,Creatinine,Calcium,PROT,Albumin,Bilitot,Alkphos,AST,ALT,CRP,ESR,RF,CCP,LEISA,SSA,CPK,uric acid) )@  I have reviewed all available lab results and radiology reports.    Radiology/Diagnostic Studies:        Assessment/Plan:   (1) 72 y.o. female with diagnosis of osteoarthritis. She is stable.              Discussion:     I have explained all of the above in detail and the patient understands all of the current recommendation(s). I have answered  all questions to the best of my ability and to their complete satisfaction.       The patient is to continue with the current management plan         RTC in  4-6 months or before if needed.      Electronically signed by Cholo Eldridge MD

## 2019-01-08 ENCOUNTER — OFFICE VISIT (OUTPATIENT)
Dept: RHEUMATOLOGY | Facility: CLINIC | Age: 74
End: 2019-01-08
Payer: MEDICARE

## 2019-01-08 VITALS
SYSTOLIC BLOOD PRESSURE: 154 MMHG | DIASTOLIC BLOOD PRESSURE: 81 MMHG | WEIGHT: 224.13 LBS | BODY MASS INDEX: 38.47 KG/M2

## 2019-01-08 DIAGNOSIS — M15.9 PRIMARY OSTEOARTHRITIS INVOLVING MULTIPLE JOINTS: Primary | ICD-10-CM

## 2019-01-08 PROCEDURE — 3079F DIAST BP 80-89 MM HG: CPT | Mod: ,,, | Performed by: INTERNAL MEDICINE

## 2019-01-08 PROCEDURE — 99213 PR OFFICE/OUTPT VISIT, EST, LEVL III, 20-29 MIN: ICD-10-PCS | Mod: ,,, | Performed by: INTERNAL MEDICINE

## 2019-01-08 PROCEDURE — 99213 OFFICE O/P EST LOW 20 MIN: CPT | Mod: ,,, | Performed by: INTERNAL MEDICINE

## 2019-01-08 PROCEDURE — 3077F PR MOST RECENT SYSTOLIC BLOOD PRESSURE >= 140 MM HG: ICD-10-PCS | Mod: ,,, | Performed by: INTERNAL MEDICINE

## 2019-01-08 PROCEDURE — 3077F SYST BP >= 140 MM HG: CPT | Mod: ,,, | Performed by: INTERNAL MEDICINE

## 2019-01-08 PROCEDURE — 3079F PR MOST RECENT DIASTOLIC BLOOD PRESSURE 80-89 MM HG: ICD-10-PCS | Mod: ,,, | Performed by: INTERNAL MEDICINE

## 2019-01-08 NOTE — PROGRESS NOTES
[facility] RHEUMATOLOGY            PROGRESS NOTE      Subjective:       Patient ID:   NAME: Tierra Taylor : 1945     73 y.o. female    Referring Doc: No ref. provider found  Other Physicians:    Chief Complaint:  Osteoarthritis      History of Present Illness:     Patient returns today for a regularly scheduled follow-up visit for  osteoarthritis     The patient is doing well. No GI complaints. No chest pains cough or shortness of breath. Arthralgias relieved  with medication.            ROS:   GEN:  No  fever, night sweats . weight is stable   No fatigue  SKIN: no rashes, no bruising, no ulcerations, no Raynaud's  HEENT: no HA's, No visual changes, no mucosal ulcers, no sicca symptoms,  CV:   no CP, SOB, PND, NEIL, no orthopnea, no palpitations  PULM: normal with no SOB, cough, hemoptysis, sputum or pleuritic pain  GI:  no abdominal pain, nausea, vomiting, constipation, diarrhea, melanotic stools, BRBPR, hematemesis, no dysphagia  :   no dysuria  NEURO: no paresthesias, headaches, visual disturbances, muscle weakness  MUSCULOSKELETAL:no joint swelling, prolonged AM stiffness, no back pain, no muscle pain  Allergies:  Review of patient's allergies indicates:   Allergen Reactions    Penicillins     Tramadol      causes itching       Medications:    Current Outpatient Medications:     allopurinol (ZYLOPRIM) 100 MG tablet, 100 mg., Disp: , Rfl:     amLODIPine (NORVASC) 5 MG tablet, , Disp: , Rfl:     aspirin 81 MG Chew, Take 81 mg by mouth once daily., Disp: , Rfl:     carvedilol (COREG) 12.5 MG tablet, , Disp: , Rfl:     diclofenac sodium 2 % SoPk, Apply 2 Pump topically 2 (two) times daily., Disp: 1 packet, Rfl: 3    duloxetine (CYMBALTA) 60 MG capsule, Take by mouth., Disp: , Rfl:     hydroCHLOROthiazide (HYDRODIURIL) 25 MG tablet, , Disp: , Rfl:     hydrocodone-acetaminophen 5-325mg (NORCO) 5-325 mg per tablet, Take 1 tablet by mouth every 8 (eight) hours as needed for Pain., Disp: 12  tablet, Rfl: 0    lisinopril (PRINIVIL,ZESTRIL) 20 MG tablet, Take 20 mg by mouth once daily., Disp: , Rfl:     mometasone (NASONEX) 50 mcg/actuation nasal spray, 2 sprays by Nasal route once daily., Disp: , Rfl:     salsalate (DISALCID) 500 MG Tab, 2 tablets bid-tid pc prn, Disp: 120 tablet, Rfl: 0    simvastatin (ZOCOR) 20 MG tablet, , Disp: , Rfl:     PMHx/PSHx Updates:        Objective:     Vitals:  Blood pressure (!) 154/81, weight 101.7 kg (224 lb 1.6 oz).    Physical Examination:   GEN: no apparent distress, comfortable; AAOx3  SKIN: no rashes,no ulceration, no Raynaud's, no petechiae, no SQ nodules,  HEAD: normal  EYES: no pallor, no icterus  NECK: no masses, thyroid normal, trachea midline, no LAD/LN's, supple  CV: RRR with no murmur; l S1 and S2 reg. ,no gallop no rubs,   CHEST: Normal respiratory effort; CTAB; normal breath sounds; no wheeze or crackles  MUSC/Skeletal: ROM normal; no crepitus; joints without synovitis,  no deformities  No joint swelling or tenderness of PIP, MCP, wrist, elbow, shoulder, or knee joints  EXTREM: no clubbing, cyanosis, + trace  edema,normal  pulses   NEURO: grossly intact; motor WNL; AAOx3; ,  PSYCH: normal mood, affect and behavior  LYMPH: normal cervical, supraclavicular          Labs:   Lab Results   Component Value Date    WBC 5.5 10/23/2017    HGB 12.7 10/23/2017    HCT 39.1 10/23/2017    MCV 91.1 10/23/2017     10/23/2017    CMP  @LASTLAB(NA,K,CL,CO2,GLU,BUN,Creatinine,Calcium,PROT,Albumin,Bilitot,Alkphos,AST,ALT,CRP,ESR,RF,CCP,LEISA,SSA,CPK,uric acid) )@  I have reviewed all available lab results and radiology reports.    Radiology/Diagnostic Studies:        Assessment/Plan:   (1) 73 y.o. female with diagnosis of osteoarthritis. She is stable. She will have blood work done next month for primary care physician. She will last results to be sent to me.  Blood pressure is slightly elevated will follow up with primary care physician.              Discussion:      I have explained all of the above in detail and the patient understands all of the current recommendation(s). I have answered all questions to the best of my ability and to their complete satisfaction.       The patient is to continue with the current management plan         RTC in   Or months or before if needed      Electronically signed by Cholo Eldridge MD

## 2019-05-17 ENCOUNTER — HOSPITAL ENCOUNTER (EMERGENCY)
Facility: HOSPITAL | Age: 74
Discharge: HOME | End: 2019-05-17
Attending: EMERGENCY MEDICINE
Payer: MEDICARE

## 2019-05-17 VITALS
DIASTOLIC BLOOD PRESSURE: 82 MMHG | WEIGHT: 224.19 LBS | HEART RATE: 70 BPM | RESPIRATION RATE: 16 BRPM | TEMPERATURE: 98 F | SYSTOLIC BLOOD PRESSURE: 146 MMHG | BODY MASS INDEX: 38.27 KG/M2 | HEIGHT: 64 IN | OXYGEN SATURATION: 96 %

## 2019-05-17 DIAGNOSIS — M79.672 LEFT FOOT PAIN: ICD-10-CM

## 2019-05-17 DIAGNOSIS — M10.9 ACUTE GOUT OF LEFT FOOT, UNSPECIFIED CAUSE: Primary | ICD-10-CM

## 2019-05-17 LAB — URATE SERPL-MCNC: 9 MG/DL (ref 2.4–5.7)

## 2019-05-17 PROCEDURE — 99284 EMERGENCY DEPT VISIT MOD MDM: CPT | Mod: 25

## 2019-05-17 PROCEDURE — 96372 THER/PROPH/DIAG INJ SC/IM: CPT

## 2019-05-17 PROCEDURE — 63600175 PHARM REV CODE 636 W HCPCS: Performed by: PHYSICIAN ASSISTANT

## 2019-05-17 PROCEDURE — 25000003 PHARM REV CODE 250: Performed by: PHYSICIAN ASSISTANT

## 2019-05-17 PROCEDURE — 36415 COLL VENOUS BLD VENIPUNCTURE: CPT

## 2019-05-17 PROCEDURE — 84550 ASSAY OF BLOOD/URIC ACID: CPT

## 2019-05-17 RX ORDER — METHYLPREDNISOLONE 4 MG/1
TABLET ORAL
Qty: 1 PACKAGE | Refills: 0 | Status: SHIPPED | OUTPATIENT
Start: 2019-05-17 | End: 2019-06-07

## 2019-05-17 RX ORDER — DEXAMETHASONE SODIUM PHOSPHATE 4 MG/ML
4 INJECTION, SOLUTION INTRA-ARTICULAR; INTRALESIONAL; INTRAMUSCULAR; INTRAVENOUS; SOFT TISSUE
Status: COMPLETED | OUTPATIENT
Start: 2019-05-17 | End: 2019-05-17

## 2019-05-17 RX ORDER — HYDROCODONE BITARTRATE AND ACETAMINOPHEN 5; 325 MG/1; MG/1
1 TABLET ORAL
Status: COMPLETED | OUTPATIENT
Start: 2019-05-17 | End: 2019-05-17

## 2019-05-17 RX ORDER — HYDROCODONE BITARTRATE AND ACETAMINOPHEN 5; 325 MG/1; MG/1
1 TABLET ORAL 4 TIMES DAILY PRN
Qty: 20 TABLET | Refills: 0 | Status: SHIPPED | OUTPATIENT
Start: 2019-05-17 | End: 2019-06-17

## 2019-05-17 RX ADMIN — DEXAMETHASONE SODIUM PHOSPHATE 4 MG: 4 INJECTION, SOLUTION INTRAMUSCULAR; INTRAVENOUS at 03:05

## 2019-05-17 RX ADMIN — HYDROCODONE BITARTRATE AND ACETAMINOPHEN 1 TABLET: 5; 325 TABLET ORAL at 02:05

## 2019-05-17 NOTE — ED PROVIDER NOTES
Encounter Date: 5/17/2019    SCRIBE #1 NOTE: IChana, am scribing for, and in the presence of, Marilee Aleman PA-C.       History     Chief Complaint   Patient presents with    Foot Pain     L foot x 1 week. Denies trauma. Hx gout.       Time seen by provider: 1:55 PM on 05/17/2019    Tierra Taylor is a 73 y.o. female with PMHx of HTN and arthritis who presents to the ED with complaints of left foot pain for the past x1 week. She denies recent falls or injury. Pain worsens with pressure. She has taken Tylenol and Advil for the pain with no relief. The patient endorses having a history of gout but denies history of DM. She has no other medical concerns or complaints at this moment. She denies onset of any other new symptoms currently. No pertinent SHx on file. Penicillin and Tramadol allergies noted.     The history is provided by the patient.     Review of patient's allergies indicates:   Allergen Reactions    Penicillins     Tramadol      causes itching     Past Medical History:   Diagnosis Date    Arthritis     Hypertension      History reviewed. No pertinent surgical history.  History reviewed. No pertinent family history.  Social History     Tobacco Use    Smoking status: Never Smoker    Smokeless tobacco: Never Used   Substance Use Topics    Alcohol use: No    Drug use: No     Review of Systems   Constitutional: Negative for activity change and fever.   HENT: Negative for facial swelling.    Respiratory: Negative for shortness of breath.    Cardiovascular: Negative for chest pain.   Gastrointestinal: Negative for nausea.   Musculoskeletal: Negative for gait problem and neck pain.        + right foot pain   Skin: Negative for rash.   Neurological: Negative for weakness and numbness.   Hematological: Does not bruise/bleed easily.   Psychiatric/Behavioral: The patient is not nervous/anxious.        Physical Exam     Initial Vitals [05/17/19 1302]   BP Pulse Resp Temp SpO2   (!) 146/82 70  16 98.1 °F (36.7 °C) 96 %      MAP       --         Physical Exam    Nursing note and vitals reviewed.  Constitutional: She appears well-developed and well-nourished. She is not diaphoretic. No distress.   HENT:   Head: Normocephalic and atraumatic.   Mouth/Throat: No oropharyngeal exudate.   Eyes: EOM are normal.   Neck: Normal range of motion.   Cardiovascular: Normal rate, regular rhythm, normal heart sounds and intact distal pulses.   Pulmonary/Chest: Breath sounds normal. She has no wheezes. She has no rhonchi. She has no rales.   Musculoskeletal: Normal range of motion. She exhibits tenderness.   Erythema, swelling, and TTP to the left foot along the left great toe extending to the left 2nd toe.  No warmth.  No decreased ROM, decreased strength or loss of sensation to LLE.  Palpable 2+ pedal pulse.    Neurological: She is alert and oriented to person, place, and time.   Skin: Skin is warm and dry. No rash noted. There is erythema.   Psychiatric: She has a normal mood and affect.         ED Course   Procedures  Labs Reviewed   URIC ACID - Abnormal; Notable for the following components:       Result Value    Uric Acid 9.0 (*)     All other components within normal limits          Imaging Results          X-Ray Foot Complete Left (Final result)  Result time 05/17/19 14:42:50    Final result by Victor M Schmitt MD (05/17/19 14:42:50)                 Impression:      Forefoot soft tissue swelling without acute osseous abnormality.      Electronically signed by: Victor M Schmitt MD  Date:    05/17/2019  Time:    14:42             Narrative:    EXAMINATION:  XR FOOT COMPLETE 3 VIEW LEFT    CLINICAL HISTORY:  .  Pain in left foot    TECHNIQUE:  AP, lateral and oblique views of the left foot were performed.    COMPARISON:  None    FINDINGS:  There is no fracture or dislocation.  There is mild degenerative change of the 1st metatarsophalangeal joint.  There is mild generalized soft tissue swelling of the  forefoot.                                 Medical Decision Making:   History:   I obtained history from: someone other than patient.       <> Summary of History: Family member  Old Medical Records: I decided to obtain old medical records.  Old Records Summarized: records from clinic visits and records from previous admission(s).  Differential Diagnosis:   Gout  Fracture  Cellulitis  Sprain    Clinical Tests:   Lab Tests: Reviewed and Ordered  Radiological Study: Reviewed and Ordered       APC / Resident Notes:   Uric acid elevated at 9.  X-rays of foot independently interpreted to show no acute bony abnormalities, fractures or dislocations.  Symptoms are likely related to gout.  Low suspicion for cellulitis.  She will be treated with steroids and pain medication.  She is encouraged to follow-up with Dr. Eldridge.  She voices understanding and is agreeable to the plan.  She is given specific return precautions.          Scribe Attestation:   Scribe #1: I performed the above scribed service and the documentation accurately describes the services I performed. I attest to the accuracy of the note.      I, Marilee Aleman PA-C, personally performed the services described in this documentation. All medical record entries made by the scribe were at my direction and in my presence.  I have reviewed the chart and agree that the record reflects my personal performance and is accurate and complete. Marilee Aleman PA-C.  5:24 PM 05/17/2019               Clinical Impression:       ICD-10-CM ICD-9-CM   1. Acute gout of left foot, unspecified cause M10.9 274.01   2. Left foot pain M79.672 729.5         Disposition:   Disposition: Discharged  Condition: Stable                        Marilee Aleman PA-C  05/17/19 0060

## 2019-06-17 ENCOUNTER — OFFICE VISIT (OUTPATIENT)
Dept: ORTHOPEDICS | Facility: CLINIC | Age: 74
End: 2019-06-17
Payer: MEDICARE

## 2019-06-17 VITALS
SYSTOLIC BLOOD PRESSURE: 134 MMHG | HEART RATE: 78 BPM | WEIGHT: 221 LBS | HEIGHT: 64 IN | BODY MASS INDEX: 37.73 KG/M2 | DIASTOLIC BLOOD PRESSURE: 82 MMHG

## 2019-06-17 DIAGNOSIS — M17.11 PRIMARY OSTEOARTHRITIS OF RIGHT KNEE: Primary | ICD-10-CM

## 2019-06-17 DIAGNOSIS — M17.12 PRIMARY OSTEOARTHRITIS OF LEFT KNEE: ICD-10-CM

## 2019-06-17 PROCEDURE — 73562 PR  X-RAY KNEE 3 VIEW: ICD-10-PCS | Mod: 50,,, | Performed by: ORTHOPAEDIC SURGERY

## 2019-06-17 PROCEDURE — 99203 OFFICE O/P NEW LOW 30 MIN: CPT | Mod: 57,,, | Performed by: ORTHOPAEDIC SURGERY

## 2019-06-17 PROCEDURE — 3079F PR MOST RECENT DIASTOLIC BLOOD PRESSURE 80-89 MM HG: ICD-10-PCS | Mod: ,,, | Performed by: ORTHOPAEDIC SURGERY

## 2019-06-17 PROCEDURE — 3075F PR MOST RECENT SYSTOLIC BLOOD PRESS GE 130-139MM HG: ICD-10-PCS | Mod: ,,, | Performed by: ORTHOPAEDIC SURGERY

## 2019-06-17 PROCEDURE — 73562 X-RAY EXAM OF KNEE 3: CPT | Mod: 50,,, | Performed by: ORTHOPAEDIC SURGERY

## 2019-06-17 PROCEDURE — 1101F PR PT FALLS ASSESS DOC 0-1 FALLS W/OUT INJ PAST YR: ICD-10-PCS | Mod: ,,, | Performed by: ORTHOPAEDIC SURGERY

## 2019-06-17 PROCEDURE — 3075F SYST BP GE 130 - 139MM HG: CPT | Mod: ,,, | Performed by: ORTHOPAEDIC SURGERY

## 2019-06-17 PROCEDURE — 1101F PT FALLS ASSESS-DOCD LE1/YR: CPT | Mod: ,,, | Performed by: ORTHOPAEDIC SURGERY

## 2019-06-17 PROCEDURE — 99203 PR OFFICE/OUTPT VISIT, NEW, LEVL III, 30-44 MIN: ICD-10-PCS | Mod: 57,,, | Performed by: ORTHOPAEDIC SURGERY

## 2019-06-17 PROCEDURE — 3079F DIAST BP 80-89 MM HG: CPT | Mod: ,,, | Performed by: ORTHOPAEDIC SURGERY

## 2019-06-17 RX ORDER — ROSUVASTATIN CALCIUM 20 MG/1
20 TABLET, COATED ORAL NIGHTLY
Refills: 1 | COMMUNITY
Start: 2019-06-03 | End: 2019-12-16 | Stop reason: SDUPTHER

## 2019-06-17 NOTE — PROGRESS NOTES
[facility] ORTHOPEDICS    Subjective:     Chief Complaint:   Chief Complaint   Patient presents with    Right Knee - Pain     RT knee pain for years but worse recently. States she has had a scope years ago and that gave no relief. She has also had Synvisc and that didn't work. Pain depends on activity level and the day.    Left Knee - Pain     LT knee pain for years but worse recently. States she has had a scope years ago and that gave no relief. She has also had Synvisc and that didn't work. Pain depends on activity level and the day.       Past Medical History:   Diagnosis Date    Arthritis     Hypertension        Past Surgical History:   Procedure Laterality Date    HYSTERECTOMY         Current Outpatient Medications   Medication Sig    allopurinol (ZYLOPRIM) 100 MG tablet 100 mg.    aspirin 81 MG Chew Take 81 mg by mouth once daily.    carvedilol (COREG) 12.5 MG tablet     hydroCHLOROthiazide (HYDRODIURIL) 25 MG tablet     rosuvastatin (CRESTOR) 20 MG tablet Take 20 mg by mouth once daily.     No current facility-administered medications for this visit.        Review of patient's allergies indicates:   Allergen Reactions    Lisinopril Other (See Comments)     Cough    Penicillins     Tramadol      causes itching       Family History   Problem Relation Age of Onset    Hypertension Mother     Hypertension Father        Social History     Socioeconomic History    Marital status: Single     Spouse name: Not on file    Number of children: Not on file    Years of education: Not on file    Highest education level: Not on file   Occupational History    Not on file   Social Needs    Financial resource strain: Not on file    Food insecurity:     Worry: Not on file     Inability: Not on file    Transportation needs:     Medical: Not on file     Non-medical: Not on file   Tobacco Use    Smoking status: Never Smoker    Smokeless tobacco: Never Used   Substance and Sexual Activity    Alcohol use: No     Drug use: No    Sexual activity: Not on file   Lifestyle    Physical activity:     Days per week: Not on file     Minutes per session: Not on file    Stress: Not on file   Relationships    Social connections:     Talks on phone: Not on file     Gets together: Not on file     Attends [employer]ist service: Not on file     Active member of club or organization: Not on file     Attends meetings of clubs or organizations: Not on file     Relationship status: Not on file   Other Topics Concern    Not on file   Social History Narrative    Not on file       History of present illness: This 73-year-old lady has bilateral knee pain. She's had arthroscopy of both knees in the distant past she's had injections. The injections are not helping much she struggling more more she's wobbling and limping otherwise in pretty good condition but it just not able to get around because of her knees      Review of Systems:    Constitution: Negative for chills, fever, and sweats.  Negative for unexplained weight loss.    HENT:  Negative for headaches and blurry vision.    Cardiovascular:Negative for chest pain or irregular heart beat. Negative for hypertension.    Respiratory:  Negative for cough and shortness of breath.    Gastrointestinal: Negative for abdominal pain, heartburn, melena, nausea, and vomitting.    Genitourinary:  Negative bladder incontinence and dysuria.    Musculoskeletal:  See HPI for details.     Neurological: Negative for numbness.    Psychiatric/Behavioral: Negative for depression.  The patient is not nervous/anxious.      Endocrine: Negative for polyuria    Hematologic/Lymphatic: Negative for bleeding problem.  Does not bruise/bleed easily.    Skin: Negative for poor would healing and rash    Objective:      Physical Examination:    Vital Signs:    Vitals:    06/17/19 0927   BP: 134/82   Pulse: 78       Body mass index is 37.93 kg/m².    This a well-developed, well nourished patient in no acute distress.  They  are alert and oriented and cooperative to examination.        So physical exam shows indeed she wobbles side to side pretty badly. She has some varus of both knees right knee has motion to about 9010° flexion contracture tenderness along medial joint line crepitation medially. The left knee has little bit better motion past 90 mild flexion contracture tenderness medial compartment.  Pertinent New Results:    XRAY Report / Interpretation:   AP lateral sunrise x-ray both knees shows on AP we have advanced collapse medial compartment bone on bone with some varus right and left knee. Right knee is maybe a little worse on left but she bone-on-bone both sides with spurring. Lateral view also has final femoral arthritis bilateral. No acute findings. Signature    Assessment/Plan:      So impression she really needs a total knee arthroplasty right the little more symptomatic than the left the injections are not helping much she's had both Synvisc and cortisone. She is generally in pretty good health as near as I can tell. So we talked about total knee arthroplasty we did history and physical for right total knee. She will need preoperative clearance. And she'll need to make arrangements for postop care with her family.      This note was created using Dragon voice recognition software that occasionally misinterpreted phrases or words.

## 2019-07-16 ENCOUNTER — TELEPHONE (OUTPATIENT)
Dept: ORTHOPEDICS | Facility: CLINIC | Age: 74
End: 2019-07-16

## 2019-07-16 NOTE — TELEPHONE ENCOUNTER
----- Message from Fay Rodríguez sent at 7/15/2019  1:06 PM CDT -----  Please call patient regarding status of surgery.

## 2019-07-16 NOTE — TELEPHONE ENCOUNTER
Spoke to Patient, we are waiting on cardiac clearance. We re-faxed the request and she will call them!

## 2019-07-30 ENCOUNTER — TELEPHONE (OUTPATIENT)
Dept: ORTHOPEDICS | Facility: CLINIC | Age: 74
End: 2019-07-30

## 2019-07-30 DIAGNOSIS — Z01.818 PRE-OP TESTING: ICD-10-CM

## 2019-07-30 DIAGNOSIS — M17.12 PRIMARY OSTEOARTHRITIS OF LEFT KNEE: Primary | ICD-10-CM

## 2019-07-30 DIAGNOSIS — M17.12 OSTEOARTHRITIS OF LEFT KNEE: ICD-10-CM

## 2019-07-30 RX ORDER — MUPIROCIN 20 MG/G
OINTMENT TOPICAL
Status: CANCELLED | OUTPATIENT
Start: 2019-07-30

## 2019-07-30 NOTE — TELEPHONE ENCOUNTER
----- Message from Vicki Bailey sent at 7/30/2019 10:27 AM CDT -----  Contact: patient's daughter Daniel  Her daughter was calling on her behalf to schedule surgery but she stated that you could call her mom back to schedule the surgery with Dr Romero, call her mom back at [phone].

## 2019-08-12 ENCOUNTER — HOSPITAL ENCOUNTER (OUTPATIENT)
Dept: RADIOLOGY | Facility: HOSPITAL | Age: 74
Discharge: HOME | End: 2019-08-12
Attending: ORTHOPAEDIC SURGERY
Payer: MEDICARE

## 2019-08-12 ENCOUNTER — HOSPITAL ENCOUNTER (OUTPATIENT)
Dept: PREADMISSION TESTING | Facility: HOSPITAL | Age: 74
Discharge: HOME | End: 2019-08-12
Attending: ORTHOPAEDIC SURGERY
Payer: MEDICARE

## 2019-08-12 VITALS — WEIGHT: 223 LBS | BODY MASS INDEX: 38.07 KG/M2 | HEIGHT: 64 IN

## 2019-08-12 DIAGNOSIS — Z01.818 PRE-OP TESTING: ICD-10-CM

## 2019-08-12 DIAGNOSIS — M17.12 PRIMARY OSTEOARTHRITIS OF LEFT KNEE: Primary | ICD-10-CM

## 2019-08-12 LAB
ABO + RH BLD: NORMAL
ALBUMIN SERPL BCP-MCNC: 4.2 G/DL (ref 3.5–5.2)
ALP SERPL-CCNC: 81 U/L (ref 55–135)
ALT SERPL W/O P-5'-P-CCNC: 16 U/L (ref 10–44)
ANION GAP SERPL CALC-SCNC: 8 MMOL/L (ref 8–16)
AST SERPL-CCNC: 20 U/L (ref 10–40)
BACTERIA #/AREA URNS HPF: ABNORMAL /HPF
BASOPHILS # BLD AUTO: 0.03 K/UL (ref 0–0.2)
BASOPHILS NFR BLD: 0.6 % (ref 0–1.9)
BILIRUB SERPL-MCNC: 0.9 MG/DL (ref 0.1–1)
BILIRUB UR QL STRIP: NEGATIVE
BLD GP AB SCN CELLS X3 SERPL QL: NORMAL
BUN SERPL-MCNC: 14 MG/DL (ref 8–23)
CALCIUM SERPL-MCNC: 9.9 MG/DL (ref 8.7–10.5)
CHLORIDE SERPL-SCNC: 102 MMOL/L (ref 95–110)
CLARITY UR: ABNORMAL
CO2 SERPL-SCNC: 33 MMOL/L (ref 23–29)
COLOR UR: YELLOW
CREAT SERPL-MCNC: 0.9 MG/DL (ref 0.5–1.4)
DIFFERENTIAL METHOD: ABNORMAL
EOSINOPHIL # BLD AUTO: 0.2 K/UL (ref 0–0.5)
EOSINOPHIL NFR BLD: 3.4 % (ref 0–8)
ERYTHROCYTE [DISTWIDTH] IN BLOOD BY AUTOMATED COUNT: 13 % (ref 11.5–14.5)
EST. GFR  (AFRICAN AMERICAN): >60 ML/MIN/1.73 M^2
EST. GFR  (NON AFRICAN AMERICAN): >60 ML/MIN/1.73 M^2
GLUCOSE SERPL-MCNC: 113 MG/DL (ref 70–110)
GLUCOSE UR QL STRIP: NEGATIVE
HCT VFR BLD AUTO: 44.2 % (ref 37–48.5)
HGB BLD-MCNC: 13.6 G/DL (ref 12–16)
HGB UR QL STRIP: NEGATIVE
HYALINE CASTS #/AREA URNS LPF: 0 /LPF
IMM GRANULOCYTES # BLD AUTO: 0.01 K/UL (ref 0–0.04)
KETONES UR QL STRIP: NEGATIVE
LEUKOCYTE ESTERASE UR QL STRIP: ABNORMAL
LYMPHOCYTES # BLD AUTO: 1.8 K/UL (ref 1–4.8)
LYMPHOCYTES NFR BLD: 37.1 % (ref 18–48)
MCH RBC QN AUTO: 29.6 PG (ref 27–31)
MCHC RBC AUTO-ENTMCNC: 30.8 G/DL (ref 32–36)
MCV RBC AUTO: 96 FL (ref 82–98)
MICROSCOPIC COMMENT: ABNORMAL
MONOCYTES # BLD AUTO: 0.5 K/UL (ref 0.3–1)
MONOCYTES NFR BLD: 10.5 % (ref 4–15)
NEUTROPHILS # BLD AUTO: 2.4 K/UL (ref 1.8–7.7)
NEUTROPHILS NFR BLD: 48.2 % (ref 38–73)
NITRITE UR QL STRIP: NEGATIVE
NRBC BLD-RTO: 0 /100 WBC
PH UR STRIP: 6 [PH] (ref 5–8)
PLATELET # BLD AUTO: 234 K/UL (ref 150–350)
PMV BLD AUTO: 11.2 FL (ref 9.2–12.9)
POTASSIUM SERPL-SCNC: 3.7 MMOL/L (ref 3.5–5.1)
PROT SERPL-MCNC: 8 G/DL (ref 6–8.4)
PROT UR QL STRIP: ABNORMAL
RBC # BLD AUTO: 4.59 M/UL (ref 4–5.4)
RBC #/AREA URNS HPF: 2 /HPF (ref 0–4)
SODIUM SERPL-SCNC: 143 MMOL/L (ref 136–145)
SP GR UR STRIP: 1.02 (ref 1–1.03)
SQUAMOUS #/AREA URNS HPF: 12 /HPF
URN SPEC COLLECT METH UR: ABNORMAL
UROBILINOGEN UR STRIP-ACNC: 1 EU/DL
WBC # BLD AUTO: 4.93 K/UL (ref 3.9–12.7)
WBC #/AREA URNS HPF: 11 /HPF (ref 0–5)

## 2019-08-12 PROCEDURE — 81000 URINALYSIS NONAUTO W/SCOPE: CPT

## 2019-08-12 PROCEDURE — 99900104 DSU ONLY-NO CHARGE-EA ADD'L HR (STAT)

## 2019-08-12 PROCEDURE — 85025 COMPLETE CBC W/AUTO DIFF WBC: CPT

## 2019-08-12 PROCEDURE — 71046 XR CHEST PA AND LATERAL: ICD-10-PCS | Mod: 26,,, | Performed by: RADIOLOGY

## 2019-08-12 PROCEDURE — 87081 CULTURE SCREEN ONLY: CPT | Mod: 59

## 2019-08-12 PROCEDURE — 93005 ELECTROCARDIOGRAM TRACING: CPT

## 2019-08-12 PROCEDURE — 93010 ELECTROCARDIOGRAM REPORT: CPT | Mod: ,,, | Performed by: INTERNAL MEDICINE

## 2019-08-12 PROCEDURE — 99900103 DSU ONLY-NO CHARGE-INITIAL HR (STAT)

## 2019-08-12 PROCEDURE — 71046 X-RAY EXAM CHEST 2 VIEWS: CPT | Mod: 26,,, | Performed by: RADIOLOGY

## 2019-08-12 PROCEDURE — 87086 URINE CULTURE/COLONY COUNT: CPT

## 2019-08-12 PROCEDURE — 80053 COMPREHEN METABOLIC PANEL: CPT

## 2019-08-12 PROCEDURE — 93010 EKG 12-LEAD: ICD-10-PCS | Mod: ,,, | Performed by: INTERNAL MEDICINE

## 2019-08-12 PROCEDURE — 71046 X-RAY EXAM CHEST 2 VIEWS: CPT | Mod: TC,FY

## 2019-08-12 PROCEDURE — 86850 RBC ANTIBODY SCREEN: CPT

## 2019-08-12 PROCEDURE — 36415 COLL VENOUS BLD VENIPUNCTURE: CPT

## 2019-08-12 RX ORDER — NAPROXEN SODIUM 220 MG
220 TABLET ORAL 2 TIMES DAILY WITH MEALS
COMMUNITY
End: 2022-03-24

## 2019-08-12 RX ORDER — IBUPROFEN 200 MG
200 TABLET ORAL EVERY 6 HOURS PRN
COMMUNITY
End: 2021-06-02

## 2019-08-12 NOTE — OR NURSING
Results for RIP ACOSTA (MRN [id number]) as of 8/12/2019 11:46   Ref. Range 8/12/2019 10:45   Specimen UA Unknown Urine, Clean Catch   Color, UA Latest Ref Range: Yellow, Straw, Kelly  Yellow   Appearance, UA Latest Ref Range: Clear  Hazy (A)   Specific [place], UA Latest Ref Range: 1.005 - 1.030  1.025   pH, UA Latest Ref Range: 5.0 - 8.0  6.0   Protein, UA Latest Ref Range: Negative  1+ (A)   Glucose, UA Latest Ref Range: Negative  Negative   Ketones, UA Latest Ref Range: Negative  Negative   Occult Blood UA Latest Ref Range: Negative  Negative   NITRITE UA Latest Ref Range: Negative  Negative   UROBILINOGEN UA Latest Ref Range: <2.0 EU/dL 1.0   Bilirubin (UA) Latest Ref Range: Negative  Negative   Leukocytes, UA Latest Ref Range: Negative  2+ (A)   RBC, UA Latest Ref Range: 0 - 4 /hpf 2   WBC, UA Latest Ref Range: 0 - 5 /hpf 11 (H)   Bacteria, UA Latest Ref Range: None-Occ /hpf Moderate (A)   Squam Epithel, UA Latest Units: /hpf 12   Hyaline Casts, UA Latest Ref Range: 0-1/lpf /lpf 0   Microscopic Comment Unknown SEE COMMENT   CULTURE, URINE Unknown Rpt     SENT MESSAGE TO CATHERINE TO LET DR. WIN KNOW

## 2019-08-12 NOTE — PRE ADMISSION SCREENING
Patient Name: Tierra Taylor  YOB: 1945   MRN: [id number]     [facility]   Basic Mobility Inpatient Short Form 6 Clicks         How much difficulty does the patient currently have  Unable  A Lot  A Little  None      1. Turning over in bed (including adjusting bedclothes, sheets and blankets)?     1 []    2 []    3 [x]    4 []        2. Sitting down on and standing up from a chair with arms (e.g., wheelchair, bedside commode, etc.)     1 []  2 []  3 [x]     4 []      3. Moving from lying on back to sitting on the side of the bed?     1 []  2 []  3 []    4 [x]    How much help from another person does the patient currently need  Total  A Lot  A Little  None      4. Moving to and from a bed to a chair (including a wheelchair)?    1 []  2 []  3 []    4 [x]      5. Need to walk in hospital room?    1 []  2 []  3 []    4 [x]      6. Climbing 3-5 steps with a railing?    1 []  2 []  3 []    4 [x]       Raw Score:      22            CMS 0-100% Score:  20.91          %   Standardized Score:    53.28           CMS Modifier:       CJ                                   [facility]   Basic Mobility Inpatient Short Form 6 Clicks Score Conversion Table*         *Use this form to convert -PAC Basic Mobility Inpatient Raw Scores.   [facility] Inpatient Basic Mobility Short Form Scoring Example   1. Add the number values associated with the response to each item. For example, items totals yield a Raw Score of 21.   2. Match the raw score to the t-Scale scores (t-Scale score = 50.25, SE = 4.69).   3. Find the associated CMS % (CMS % = 28.97%).   4. Locate the correct CMS Functional Modifier Code, or G Code (G code = CJ)     NOTE: Each -PAC Short Form has a separate conversion table. Make sure that you use the correct conversion table.       Instruction Manual - page 45 contains conversion table

## 2019-08-12 NOTE — PRE ADMISSION SCREENING
"               [facility] Risk Assessment Scale    Patient Name: Tierra Taylor  YOB: 1945  MRN: [id number]            RIsk Factor Measure Recommendation Patient Data Scale/Score   BMI >40 Reconsider surgery, weight loss   Estimated body mass index is 38.28 kg/m² as calculated from the following:    Height as of this encounter: 5' 4" (1.626 m).    Weight as of this encounter: 101.2 kg (223 lb).   [] 0 = 1 - 24.9  [] 1 = 25-29.9  [] 2 = 30-34.9  [x] 3 = 35-39.9  [] 4 = 40-44.9  [] 5 = 45-99.9   Hemoglobin AIC (if applicable) >9 Delay surgery until DM under control  Refer for:  · Nutrition Therapy  · Exercise   · Medication    No results found for: LABA1C, HGBA1C    Lab Results   Component Value Date     (H) 08/12/2019      [] 0 = 4.0-5.6  [] 1 = 5.7-6.4  [] 2 = 6.5-6.9  [] 3 = 7.0-7.9  [] 4 = 8.0-8.9  [] 5 = 9.0-12   Hemoglobin (Anemia) <9 Delay surgery   Correct anemia Lab Results   Component Value Date    HGB 13.6 08/12/2019    [] 20 - <9.0                    Albumin <3 Delay surgery &Workup Lab Results   Component Value Date    ALBUMIN 4.2 08/12/2019    [] 20 - <3.0   Smoking Cessation >4 Weeks Delay Surgery  Refer to OP Cessation Class    Never Smoker [] 20 - current smoker                                _____ PPD                    Hx of MI, PE, Arrhythmia, CVA, DVT <30 Days Delay Surgery    N/A [] 20      Infection Variable Delay surgery and re-evaluate   N/A [] 20 - recent/current infection     Depression (PHQ) >10 out of 27 Delay Surgery and re-evaluate  Medication  Counseling              [x] 0     []1     []2     []3      []4      [] 5                    (1-4)      (5-9)  (10-14)  (15-19)   (20-27)     Memory Impairment & Memory loss (Mini-Cog Screening Tool) Advanced dementia and/or Parkinson's Reconsider surgery     [x] 0     []1     []2     []3     []4     [] 5     Physical Conditioning (Modified AM-PAC Per Physical Therapy at [facility]) Unable to ambulate on day of surgery Delay " surgery and re-evaluate  Pre-Rehabilitation   (PT evaluation)       []  0   [x]4       []8     []12        []16     []20       (<20%)   (<40%)   (<60%)   (<80% )    (>80%)     Home Environment/Caregiver support  (Per /Navigator Interview)    Availability of basic services and/or approprate assistance during post-operative period Delay surgery and re-evaluate  Safe home environment  Health   1 week post-surgery  Transportation  availability  Ability to obtain DME/Medications post-op    [x] 0     []1     []2     []3     []4     [] 5  [x] 0     []1     []2     []3     []4     [] 5  [x] 0     []1     []2     []3     []4     [] 5  [x] 0     []1     []2     []3     []4     [] 5         MD Contact: Dr. Romero Comments:  Total Score:  7

## 2019-08-12 NOTE — DISCHARGE INSTRUCTIONS

## 2019-08-12 NOTE — PRE ADMISSION SCREENING
JOINT CAMP ASSESSMENT    Name Tierra Taylor   MRN [id number]    Age/Sex 73 y.o. female    Surgeon Dr. Neville oRmero Jr.   Joint Camp Date 8/12/2019   Surgery Date 8/20/2019   Procedure Left Knee Arthroplasty   Insurance Payor: [employer] / Plan: [email or web address] 65 / Product Type: Medicare Advantage /    Care Team Patient Care Team:  Sofie Leonardo MD as PCP - General (Family Medicine)  Neville Romero Jr., MD as Consulting Physician (Orthopedic Surgery)    Pharmacy   [facility] [address], [place] - [address] [facility]  [address] [place] [address]  [place] [address]  Phone: [phone] Fax: [phone]     AM-PAC Score   22   Risk Assessment Score 7     Past Medical History:   Diagnosis Date    Arthritis     Hypertension     Sleep apnea        Past Surgical History:   Procedure Laterality Date    CERVICAL DISCECTOMY      FATTY TUMOR      HYSTERECTOMY      KNEE SURGERY           Home Enviroment     Living Arrangement: Lives alone  Home Environment: 1-story house/ trailer, number of inside stairs: 3, walk-in shower  Home Safety Concerns: unremarkable    DISCHARGE CAREGIVER/SUPPORT SYSTEM     Identified post-op caregiver: Patient has children / family / friends to help, daughter.  Patient's caregiver(s) will be able to provide physical assistance. Patient will have someone to assist overnight.      Caregiver present at pre-op interview:  No      PRE-OPERATIVE FUNCTIONAL STATUS     Employment: Retired    Pre-op Functional Status: Patient is independent with mobility/ambulation, transfers, ADL's, IADL's.    Use of assistive device for ambulation: none  ADL: self care  ADL Limitations: difficulty with walking  Medical Restrictions: Decreased range of motions in extremities    POTENTIAL BARRIERS TO DISCHARGE/POTENTIAL POST-OP COMPLICATIONS     Patient with hx of sleep apnea and HTN.    DISCHARGE PLAN     Expected LOS of 2 days or less for joint replacement discussed with patient.  We also discussed a discharge path of HH for approximately two weeks with a transition to outpatient PT on the third week given no post-op complications.      Patient in agreement with discharge plan: Yes    Discharge to: Discharge home with home health (PT/OT) x2 weeks with transition to outpatient PT     HH:  [facility]     OP PT: [facility]     Home DME: rolling walker and bedside commode    Needed DME at D/C: none     Rx: Per Dr. Romero at discharge     Meds to Beds: N/A  Patient expected to discharge on Aspirin 325mg by mouth twice daily for DVT prophylaxis.

## 2019-08-13 LAB
BACTERIA UR CULT: NORMAL
BACTERIA UR CULT: NORMAL

## 2019-08-14 LAB — MRSA SPEC QL CULT: NORMAL

## 2019-08-16 ENCOUNTER — TELEPHONE (OUTPATIENT)
Dept: ORTHOPEDICS | Facility: CLINIC | Age: 74
End: 2019-08-16

## 2019-08-16 DIAGNOSIS — M17.12 PRIMARY OSTEOARTHRITIS OF LEFT KNEE: Primary | ICD-10-CM

## 2019-08-16 RX ORDER — SULFAMETHOXAZOLE AND TRIMETHOPRIM 800; 160 MG/1; MG/1
1 TABLET ORAL 2 TIMES DAILY
Qty: 10 TABLET | Refills: 0 | Status: SHIPPED | OUTPATIENT
Start: 2019-08-16 | End: 2019-08-21

## 2019-08-16 NOTE — TELEPHONE ENCOUNTER
Attempted to call patient about urine results.  Whoever I spoke with does not know where she is, or her cell phone number. I did leave a message for the patient to call me back. abx sent to pharmacy as well.

## 2019-08-16 NOTE — TELEPHONE ENCOUNTER
----- Message from Corinne Morin sent at 8/16/2019  2:06 PM CDT -----  Contact: Daughter Danny [phone]  Someone from our office called patient today and her  did not understand the questions that was ask he is 84.  Dr. Romero patient surgery 8/20/19

## 2019-08-16 NOTE — PRE-PROCEDURE INSTRUCTIONS
Results for RIP ACOSTA (MRN [id number]) as of 8/16/2019 12:26   Ref. Range 8/12/2019 10:45   Specimen UA Unknown Urine, Clean Catch   Color, UA Latest Ref Range: Yellow, Straw, Kelly  Yellow   Appearance, UA Latest Ref Range: Clear  Hazy (A)   Specific [place], UA Latest Ref Range: 1.005 - 1.030  1.025   pH, UA Latest Ref Range: 5.0 - 8.0  6.0   Protein, UA Latest Ref Range: Negative  1+ (A)   Glucose, UA Latest Ref Range: Negative  Negative   Ketones, UA Latest Ref Range: Negative  Negative   Occult Blood UA Latest Ref Range: Negative  Negative   NITRITE UA Latest Ref Range: Negative  Negative   UROBILINOGEN UA Latest Ref Range: <2.0 EU/dL 1.0   Bilirubin (UA) Latest Ref Range: Negative  Negative   Leukocytes, UA Latest Ref Range: Negative  2+ (A)   RBC, UA Latest Ref Range: 0 - 4 /hpf 2   WBC, UA Latest Ref Range: 0 - 5 /hpf 11 (H)   Bacteria, UA Latest Ref Range: None-Occ /hpf Moderate (A)   Squam Epithel, UA Latest Units: /hpf 12   Hyaline Casts, UA Latest Ref Range: 0-1/lpf /lpf 0   Microscopic Comment Unknown SEE COMMENT   CULTURE, URINE Unknown Rpt     Component 4d ago   Urine Culture, Routine Multiple organisms isolated. None in predominance.  Repeat if    Urine Culture, Routine clinically necessary.    Resulting Agency [facility]       Informed Francie with Dr. Romero. She said she will put on antibiotics and for patient to repeat morning of surgery.

## 2019-08-19 RX ORDER — SODIUM CHLORIDE, SODIUM LACTATE, POTASSIUM CHLORIDE, CALCIUM CHLORIDE 600; 310; 30; 20 MG/100ML; MG/100ML; MG/100ML; MG/100ML
1000 INJECTION, SOLUTION INTRAVENOUS ONCE
Status: CANCELLED | OUTPATIENT
Start: 2019-08-19 | End: 2019-08-19

## 2019-08-20 ENCOUNTER — HOSPITAL ENCOUNTER (OUTPATIENT)
Facility: HOSPITAL | Age: 74
Discharge: HOME | End: 2019-08-20
Attending: ORTHOPAEDIC SURGERY | Admitting: ORTHOPAEDIC SURGERY
Payer: MEDICARE

## 2019-08-20 VITALS
HEART RATE: 70 BPM | SYSTOLIC BLOOD PRESSURE: 175 MMHG | TEMPERATURE: 98 F | BODY MASS INDEX: 38.07 KG/M2 | RESPIRATION RATE: 14 BRPM | DIASTOLIC BLOOD PRESSURE: 80 MMHG | WEIGHT: 223 LBS | HEIGHT: 64 IN | OXYGEN SATURATION: 95 %

## 2019-08-20 DIAGNOSIS — M17.12 OSTEOARTHRITIS OF LEFT KNEE: ICD-10-CM

## 2019-08-20 DIAGNOSIS — M17.12 PRIMARY OSTEOARTHRITIS OF LEFT KNEE: Primary | ICD-10-CM

## 2019-08-20 DIAGNOSIS — Z96.652 STATUS POST TOTAL KNEE REPLACEMENT, LEFT: ICD-10-CM

## 2019-08-20 LAB
BACTERIA #/AREA URNS HPF: ABNORMAL /HPF
BILIRUB UR QL STRIP: NEGATIVE
CLARITY UR: ABNORMAL
COLOR UR: YELLOW
GLUCOSE UR QL STRIP: NEGATIVE
HGB UR QL STRIP: NEGATIVE
KETONES UR QL STRIP: NEGATIVE
LEUKOCYTE ESTERASE UR QL STRIP: ABNORMAL
MICROSCOPIC COMMENT: ABNORMAL
NITRITE UR QL STRIP: NEGATIVE
PH UR STRIP: 7 [PH] (ref 5–8)
PROT UR QL STRIP: NEGATIVE
SP GR UR STRIP: 1.01 (ref 1–1.03)
SQUAMOUS #/AREA URNS HPF: 9 /HPF
URN SPEC COLLECT METH UR: ABNORMAL
UROBILINOGEN UR STRIP-ACNC: ABNORMAL EU/DL
WBC #/AREA URNS HPF: 12 /HPF (ref 0–5)
WBC CLUMPS URNS QL MICRO: ABNORMAL

## 2019-08-20 PROCEDURE — 25000003 PHARM REV CODE 250: Performed by: ORTHOPAEDIC SURGERY

## 2019-08-20 PROCEDURE — 81000 URINALYSIS NONAUTO W/SCOPE: CPT

## 2019-08-20 PROCEDURE — 87086 URINE CULTURE/COLONY COUNT: CPT

## 2019-08-20 PROCEDURE — 25000003 PHARM REV CODE 250: Performed by: ANESTHESIOLOGY

## 2019-08-20 PROCEDURE — 63600175 PHARM REV CODE 636 W HCPCS: Performed by: ANESTHESIOLOGY

## 2019-08-20 RX ORDER — ACETAMINOPHEN 10 MG/ML
1000 INJECTION, SOLUTION INTRAVENOUS ONCE
Status: COMPLETED | OUTPATIENT
Start: 2019-08-20 | End: 2019-08-20

## 2019-08-20 RX ORDER — CELECOXIB 100 MG/1
200 CAPSULE ORAL ONCE
Status: COMPLETED | OUTPATIENT
Start: 2019-08-20 | End: 2019-08-20

## 2019-08-20 RX ORDER — LIDOCAINE HYDROCHLORIDE 10 MG/ML
1 INJECTION, SOLUTION EPIDURAL; INFILTRATION; INTRACAUDAL; PERINEURAL ONCE
Status: DISCONTINUED | OUTPATIENT
Start: 2019-08-20 | End: 2021-06-02

## 2019-08-20 RX ORDER — MUPIROCIN 20 MG/G
OINTMENT TOPICAL
Status: DISCONTINUED | OUTPATIENT
Start: 2019-08-20 | End: 2019-08-20 | Stop reason: HOSPADM

## 2019-08-20 RX ORDER — SODIUM CHLORIDE, SODIUM LACTATE, POTASSIUM CHLORIDE, CALCIUM CHLORIDE 600; 310; 30; 20 MG/100ML; MG/100ML; MG/100ML; MG/100ML
INJECTION, SOLUTION INTRAVENOUS CONTINUOUS
Status: DISCONTINUED | OUTPATIENT
Start: 2019-08-20 | End: 2021-06-02

## 2019-08-20 RX ORDER — OXYCODONE HCL 10 MG/1
10 TABLET, FILM COATED, EXTENDED RELEASE ORAL ONCE
Status: COMPLETED | OUTPATIENT
Start: 2019-08-20 | End: 2019-08-20

## 2019-08-20 RX ORDER — PREGABALIN 75 MG/1
75 CAPSULE ORAL ONCE
Status: COMPLETED | OUTPATIENT
Start: 2019-08-20 | End: 2019-08-20

## 2019-08-20 RX ORDER — VANCOMYCIN HCL IN 5 % DEXTROSE 1G/250ML
1000 PLASTIC BAG, INJECTION (ML) INTRAVENOUS
Status: DISCONTINUED | OUTPATIENT
Start: 2019-08-20 | End: 2019-08-20 | Stop reason: HOSPADM

## 2019-08-20 RX ADMIN — OXYCODONE HYDROCHLORIDE 10 MG: 10 TABLET, FILM COATED, EXTENDED RELEASE ORAL at 07:08

## 2019-08-20 RX ADMIN — CELECOXIB 200 MG: 100 CAPSULE ORAL at 07:08

## 2019-08-20 RX ADMIN — MUPIROCIN: 20 OINTMENT TOPICAL at 07:08

## 2019-08-20 RX ADMIN — ACETAMINOPHEN 1000 MG: 10 INJECTION, SOLUTION INTRAVENOUS at 07:08

## 2019-08-20 RX ADMIN — SODIUM CHLORIDE, SODIUM LACTATE, POTASSIUM CHLORIDE, AND CALCIUM CHLORIDE: .6; .31; .03; .02 INJECTION, SOLUTION INTRAVENOUS at 07:08

## 2019-08-20 RX ADMIN — PREGABALIN 75 MG: 75 CAPSULE ORAL at 07:08

## 2019-08-20 NOTE — H&P
CC/Indication for Procedure: 73 y.o. female with Primary osteoarthritis of left knee [M17.12]  Osteoarthritis of left knee [M17.12].    Patient scheduled for ARTHROPLASTY, KNEE  OK TOTAL KNEE ARTHROPLASTY [[id number]].    Past Medical History:   Diagnosis Date    Arthritis     Hypertension     Sleep apnea      Past Surgical History:   Procedure Laterality Date    CERVICAL DISCECTOMY      FATTY TUMOR      HYSTERECTOMY      KNEE SURGERY       Family History   Problem Relation Age of Onset    Hypertension Mother     Hypertension Father      Social History     Socioeconomic History    Marital status: Single     Spouse name: Not on file    Number of children: Not on file    Years of education: Not on file    Highest education level: Not on file   Occupational History    Not on file   Social Needs    Financial resource strain: Not on file    Food insecurity:     Worry: Not on file     Inability: Not on file    Transportation needs:     Medical: Not on file     Non-medical: Not on file   Tobacco Use    Smoking status: Never Smoker    Smokeless tobacco: Never Used   Substance and Sexual Activity    Alcohol use: No    Drug use: No    Sexual activity: Not on file   Lifestyle    Physical activity:     Days per week: Not on file     Minutes per session: Not on file    Stress: Not on file   Relationships    Social connections:     Talks on phone: Not on file     Gets together: Not on file     Attends Cheondoism service: Not on file     Active member of club or organization: Not on file     Attends meetings of clubs or organizations: Not on file     Relationship status: Not on file   Other Topics Concern    Not on file   Social History Narrative    Not on file       Review of patient's allergies indicates:   Allergen Reactions    Lisinopril Other (See Comments)     Cough    Penicillins     Tramadol      causes itching         Current Facility-Administered Medications:     lactated ringers infusion, ,  Intravenous, Continuous, Marck Davies MD, Last Rate: 10 mL/hr at 08/20/19 0730    lidocaine (PF) 10 mg/ml (1%) injection 10 mg, 1 mL, Intradermal, Once, Marck Davies MD    mupirocin 2 % ointment, , Nasal, On Call Procedure, Neville Romero Jr., MD    vancomycin in dextrose 5 % 1 gram/250 mL IVPB 1,000 mg, 1,000 mg, Intravenous, On Call Procedure, Neville Romero Jr., MD    ROS:    Denies chest pain or palpitations  Denies shortness of breath  Denies fevers or chills  Denies chest pain  Denies abdominal pain    PE:    General Appearance: Well nourished  Orientation: Oriented to time, place, person  Mental Status: Alert  Heart: RRR  Lungs: CTA  Abdomen: Soft and non-tender    Anesthesia/Surgery risks, benefits and alternative options discussed and understood by patient/family.    This note was created using Dragon voice recognition software that occasionally misinterpreted phrases or words.

## 2019-08-20 NOTE — HOSPITAL COURSE
Pt here for tka.   Placed on p o abx for abn u/a last week,,,,, cath u/a today still abn  w 11wbc,    C+s sent,    surg cacelled today until u/a cleared,  Refer back ti primary care

## 2019-08-20 NOTE — DISCHARGE SUMMARY
This patient was here today for total knee arthroplasty, left.  Her preoperative   urinalysis was abnormal with 11 or 12 white cells.  She was put on p.o.   antibiotics for a few days hoping to clear that.  Today, a cath urine was   obtained and it also showed 11 or 12 white cells suspicious for low-grade   urinary tract infection.  I canceled the case thinking she had abnormal urine,   catheterized and needed further workup on her urine prior to an elective total   knee.  The patient was informed and discharged without p.o. antibiotics.  Urine   culture was obtained today.  She will need follow up with her primary care to   address any potential urinary problems.  She does not give a history of major   issues with her urinary tract.      EZEQUIEL/IN  dd: 08/20/2019 08:56:18 (CDT)  td: 08/20/2019 11:44:33 (CDT)  Doc ID   #[id number]  Job ID #[id number]    CC:

## 2019-08-20 NOTE — PROGRESS NOTES
Surgery cancelled, d/t UA worse than previous. Dr. Romero spoke with pt and family. Pt and family verbalized understanding. Pt off unit per shaun Harmon without incident.

## 2019-08-20 NOTE — PLAN OF CARE
Pt ambulated to pre-op area without incident. Pre-op routine explained. Pt verbalized understanding. Pt c/o pain to L leg 7/10.

## 2019-08-21 LAB — BACTERIA UR CULT: NORMAL

## 2019-09-30 ENCOUNTER — OFFICE VISIT (OUTPATIENT)
Dept: FAMILY MEDICINE | Facility: CLINIC | Age: 74
End: 2019-09-30
Payer: MEDICARE

## 2019-09-30 DIAGNOSIS — M1A.4710 CHRONIC GOUT DUE TO OTHER SECONDARY CAUSE INVOLVING TOE OF RIGHT FOOT WITHOUT TOPHUS: ICD-10-CM

## 2019-09-30 DIAGNOSIS — M17.12 PRIMARY OSTEOARTHRITIS OF LEFT KNEE: ICD-10-CM

## 2019-09-30 DIAGNOSIS — Z79.899 LONG-TERM USE OF HIGH-RISK MEDICATION: ICD-10-CM

## 2019-09-30 DIAGNOSIS — I11.9 HYPERTENSIVE HEART DISEASE, UNSPECIFIED WHETHER HEART FAILURE PRESENT: Primary | ICD-10-CM

## 2019-09-30 DIAGNOSIS — E78.01 FAMILIAL HYPERCHOLESTEROLEMIA: ICD-10-CM

## 2019-09-30 DIAGNOSIS — Z91.199 NONCOMPLIANCE WITH CPAP TREATMENT: ICD-10-CM

## 2019-09-30 DIAGNOSIS — Z13.89 SCREENING FOR BLOOD OR PROTEIN IN URINE: ICD-10-CM

## 2019-09-30 DIAGNOSIS — G47.33 OSA (OBSTRUCTIVE SLEEP APNEA): ICD-10-CM

## 2019-09-30 PROBLEM — M1A.9XX0 CHRONIC GOUT INVOLVING TOE OF RIGHT FOOT WITHOUT TOPHUS: Status: ACTIVE | Noted: 2019-09-30

## 2019-09-30 PROCEDURE — 1101F PR PT FALLS ASSESS DOC 0-1 FALLS W/OUT INJ PAST YR: ICD-10-PCS | Mod: S$GLB,,, | Performed by: FAMILY MEDICINE

## 2019-09-30 PROCEDURE — 99214 PR OFFICE/OUTPT VISIT, EST, LEVL IV, 30-39 MIN: ICD-10-PCS | Mod: S$GLB,,, | Performed by: FAMILY MEDICINE

## 2019-09-30 PROCEDURE — 99214 OFFICE O/P EST MOD 30 MIN: CPT | Mod: S$GLB,,, | Performed by: FAMILY MEDICINE

## 2019-09-30 PROCEDURE — 3074F PR MOST RECENT SYSTOLIC BLOOD PRESSURE < 130 MM HG: ICD-10-PCS | Mod: S$GLB,,, | Performed by: FAMILY MEDICINE

## 2019-09-30 PROCEDURE — 3078F PR MOST RECENT DIASTOLIC BLOOD PRESSURE < 80 MM HG: ICD-10-PCS | Mod: S$GLB,,, | Performed by: FAMILY MEDICINE

## 2019-09-30 PROCEDURE — 3078F DIAST BP <80 MM HG: CPT | Mod: S$GLB,,, | Performed by: FAMILY MEDICINE

## 2019-09-30 PROCEDURE — 1101F PT FALLS ASSESS-DOCD LE1/YR: CPT | Mod: S$GLB,,, | Performed by: FAMILY MEDICINE

## 2019-09-30 PROCEDURE — 3074F SYST BP LT 130 MM HG: CPT | Mod: S$GLB,,, | Performed by: FAMILY MEDICINE

## 2019-09-30 RX ORDER — MOMETASONE FUROATE 50 UG/1
SPRAY, METERED NASAL
COMMUNITY
End: 2022-03-24

## 2019-09-30 RX ORDER — AMLODIPINE BESYLATE 5 MG/1
TABLET ORAL
COMMUNITY
Start: 2015-07-16 | End: 2019-09-30 | Stop reason: SDUPTHER

## 2019-09-30 RX ORDER — AMLODIPINE BESYLATE 5 MG/1
5 TABLET ORAL DAILY
Qty: 90 TABLET | Refills: 1 | Status: SHIPPED | OUTPATIENT
Start: 2019-09-30 | End: 2020-07-29 | Stop reason: SDUPTHER

## 2019-09-30 NOTE — PROGRESS NOTES
SUBJECTIVE:    Patient ID: Tierra Taylor is a 73 y.o. female.    Chief Complaint: Hypertension (Check up) and Hyperlipidemia    Pt here to checkup on HTN, Insomnia, MARIA R, OA.    HTN is controlled.  Denies CP/SOB.  Continues to see Dr. Arita 6 months, last appt in July.    Continues to have left knee pain.  Still using Vicks, and ibuprofen for pain.    No gout flares since last visit.  Uric acid done last visit was elevated. Has stopped eating crustaceans.      Noncompliant with CPAP due claustrophobia.  Complaining of daytime sleepiness.  Daughters tell her that she snores.        Admission on 08/20/2019, Discharged on 08/20/2019   Component Date Value Ref Range Status    Specimen UA 08/20/2019 Urine, Catheterized   Final    Color, UA 08/20/2019 Yellow  Yellow, Straw, Kelly Final    Appearance, UA 08/20/2019 Hazy* Clear Final    pH, UA 08/20/2019 7.0  5.0 - 8.0 Final    Specific Gravity, UA 08/20/2019 1.015  1.005 - 1.030 Final    Protein, UA 08/20/2019 Negative  Negative Final    Glucose, UA 08/20/2019 Negative  Negative Final    Ketones, UA 08/20/2019 Negative  Negative Final    Bilirubin (UA) 08/20/2019 Negative  Negative Final    Occult Blood UA 08/20/2019 Negative  Negative Final    Nitrite, UA 08/20/2019 Negative  Negative Final    Urobilinogen, UA 08/20/2019 2.0-3.0* <2.0 EU/dL Final    Leukocytes, UA 08/20/2019 3+* Negative Final    WBC, UA 08/20/2019 12* 0 - 5 /hpf Final    WBC Clumps, UA 08/20/2019 Occasional* None-Rare Final    Bacteria 08/20/2019 Moderate* None-Occ /hpf Final    Squam Epithel, UA 08/20/2019 9  /hpf Final    Microscopic Comment 08/20/2019 SEE COMMENT   Final    Urine Culture, Routine 08/20/2019 No significant growth   Final   Hospital Outpatient Visit on 08/12/2019   Component Date Value Ref Range Status    MRSA Surveillance Screen 08/12/2019 No MRSA isolated   Final    WBC 08/12/2019 4.93  3.90 - 12.70 K/uL Final    RBC 08/12/2019 4.59  4.00 - 5.40 M/uL Final     Hemoglobin 08/12/2019 13.6  12.0 - 16.0 g/dL Final    Hematocrit 08/12/2019 44.2  37.0 - 48.5 % Final    Mean Corpuscular Volume 08/12/2019 96  82 - 98 fL Final    Mean Corpuscular Hemoglobin 08/12/2019 29.6  27.0 - 31.0 pg Final    Mean Corpuscular Hemoglobin Conc 08/12/2019 30.8* 32.0 - 36.0 g/dL Final    RDW 08/12/2019 13.0  11.5 - 14.5 % Final    Platelets 08/12/2019 234  150 - 350 K/uL Final    MPV 08/12/2019 11.2  9.2 - 12.9 fL Final    Gran # (ANC) 08/12/2019 2.4  1.8 - 7.7 K/uL Final    Immature Grans (Abs) 08/12/2019 0.01  0.00 - 0.04 K/uL Final    Lymph # 08/12/2019 1.8  1.0 - 4.8 K/uL Final    Mono # 08/12/2019 0.5  0.3 - 1.0 K/uL Final    Eos # 08/12/2019 0.2  0.0 - 0.5 K/uL Final    Baso # 08/12/2019 0.03  0.00 - 0.20 K/uL Final    nRBC 08/12/2019 0  0 /100 WBC Final    Gran% 08/12/2019 48.2  38.0 - 73.0 % Final    Lymph% 08/12/2019 37.1  18.0 - 48.0 % Final    Mono% 08/12/2019 10.5  4.0 - 15.0 % Final    Eosinophil% 08/12/2019 3.4  0.0 - 8.0 % Final    Basophil% 08/12/2019 0.6  0.0 - 1.9 % Final    Differential Method 08/12/2019 Automated   Final    Sodium 08/12/2019 143  136 - 145 mmol/L Final    Potassium 08/12/2019 3.7  3.5 - 5.1 mmol/L Final    Chloride 08/12/2019 102  95 - 110 mmol/L Final    CO2 08/12/2019 33* 23 - 29 mmol/L Final    Glucose 08/12/2019 113* 70 - 110 mg/dL Final    BUN, Bld 08/12/2019 14  8 - 23 mg/dL Final    Creatinine 08/12/2019 0.9  0.5 - 1.4 mg/dL Final    Calcium 08/12/2019 9.9  8.7 - 10.5 mg/dL Final    Total Protein 08/12/2019 8.0  6.0 - 8.4 g/dL Final    Albumin 08/12/2019 4.2  3.5 - 5.2 g/dL Final    Total Bilirubin 08/12/2019 0.9  0.1 - 1.0 mg/dL Final    Alkaline Phosphatase 08/12/2019 81  55 - 135 U/L Final    AST 08/12/2019 20  10 - 40 U/L Final    ALT 08/12/2019 16  10 - 44 U/L Final    Anion Gap 08/12/2019 8  8 - 16 mmol/L Final    eGFR if African American 08/12/2019 >60  >60 mL/min/1.73 m^2 Final    eGFR if non African  American 08/12/2019 >60  >60 mL/min/1.73 m^2 Final    Group & Rh 08/12/2019 O POS   Final    Indirect Chanel 08/12/2019 NEG   Final    Specimen UA 08/12/2019 Urine, Clean Catch   Final    Color, UA 08/12/2019 Yellow  Yellow, Straw, Kelly Final    Appearance, UA 08/12/2019 Hazy* Clear Final    pH, UA 08/12/2019 6.0  5.0 - 8.0 Final    Specific Gravity, UA 08/12/2019 1.025  1.005 - 1.030 Final    Protein, UA 08/12/2019 1+* Negative Final    Glucose, UA 08/12/2019 Negative  Negative Final    Ketones, UA 08/12/2019 Negative  Negative Final    Bilirubin (UA) 08/12/2019 Negative  Negative Final    Occult Blood UA 08/12/2019 Negative  Negative Final    Nitrite, UA 08/12/2019 Negative  Negative Final    Urobilinogen, UA 08/12/2019 1.0  <2.0 EU/dL Final    Leukocytes, UA 08/12/2019 2+* Negative Final    RBC, UA 08/12/2019 2  0 - 4 /hpf Final    WBC, UA 08/12/2019 11* 0 - 5 /hpf Final    Bacteria 08/12/2019 Moderate* None-Occ /hpf Final    Squam Epithel, UA 08/12/2019 12  /hpf Final    Hyaline Casts, UA 08/12/2019 0  0-1/lpf /lpf Final    Microscopic Comment 08/12/2019 SEE COMMENT   Final    Urine Culture, Routine 08/12/2019 Multiple organisms isolated. None in predominance.  Repeat if   Final    Urine Culture, Routine 08/12/2019 clinically necessary.   Final   Admission on 05/17/2019, Discharged on 05/17/2019   Component Date Value Ref Range Status    Uric Acid 05/17/2019 9.0* 2.4 - 5.7 mg/dL Final       Past Medical History:   Diagnosis Date    Arthritis     Hypertension     Sleep apnea      Past Surgical History:   Procedure Laterality Date    CERVICAL DISCECTOMY      FATTY TUMOR      HYSTERECTOMY      KNEE SURGERY       Family History   Problem Relation Age of Onset    Hypertension Mother     Hypertension Father        Marital Status: Single  Alcohol History:  reports that she does not drink alcohol.  Tobacco History:  reports that she has never smoked. She has never used smokeless  tobacco.  Drug History:  reports that she does not use drugs.    Review of patient's allergies indicates:   Allergen Reactions    Lisinopril Other (See Comments)     Cough    Penicillins     Tramadol      causes itching       Current Outpatient Medications:     allopurinol (ZYLOPRIM) 100 MG tablet, Take 100 mg by mouth every evening. , Disp: , Rfl:     amLODIPine (NORVASC) 5 MG tablet, Take 1 tablet (5 mg total) by mouth once daily., Disp: 90 tablet, Rfl: 1    aspirin 81 MG Chew, Take 81 mg by mouth every evening. , Disp: , Rfl:     carvedilol (COREG) 12.5 MG tablet, Take 12.5 mg by mouth 2 (two) times daily. , Disp: , Rfl:     hydroCHLOROthiazide (HYDRODIURIL) 25 MG tablet, Take 25 mg by mouth every evening. , Disp: , Rfl:     mometasone (NASONEX) 50 mcg/actuation nasal spray, 2 sprays in each nostril, Disp: , Rfl:     rosuvastatin (CRESTOR) 20 MG tablet, Take 20 mg by mouth every evening. , Disp: , Rfl: 1    ibuprofen (ADVIL,MOTRIN) 200 MG tablet, Take 200 mg by mouth every 6 (six) hours as needed for Pain., Disp: , Rfl:     naproxen sodium (ANAPROX) 220 MG tablet, Take 220 mg by mouth 2 (two) times daily with meals., Disp: , Rfl:   No current facility-administered medications for this visit.     Facility-Administered Medications Ordered in Other Visits:     lactated ringers infusion, , Intravenous, Continuous, Marck Davies MD, Last Rate: 10 mL/hr at 08/20/19 0730    lidocaine (PF) 10 mg/ml (1%) injection 10 mg, 1 mL, Intradermal, Once, Marck Davies MD    Review of Systems   Constitutional: Negative for appetite change, fatigue, fever and unexpected weight change.   Respiratory: Negative for cough, chest tightness, shortness of breath and wheezing.    Cardiovascular: Negative for chest pain and leg swelling.   Gastrointestinal: Negative for abdominal pain, constipation, nausea and vomiting.   Genitourinary: Negative for difficulty urinating, dysuria, frequency and menstrual problem.  "  Musculoskeletal: Positive for arthralgias (knees). Negative for back pain, myalgias and neck pain.   Skin: Negative for rash.   Neurological: Negative for dizziness, weakness, numbness and headaches.   Hematological: Does not bruise/bleed easily.   Psychiatric/Behavioral: Negative for dysphoric mood, sleep disturbance and suicidal ideas. The patient is not nervous/anxious.           Objective:      Vitals:    09/30/19 0932   BP: (P) 138/88   Pulse: (P) 75   Weight: (P) 99.9 kg (220 lb 3.2 oz)   Height: (P) 5' 4" (1.626 m)     Body mass index is 37.8 kg/m² (pended).  Physical Exam   Constitutional: She is oriented to person, place, and time. She appears well-developed and well-nourished.   obese   HENT:   Head: Normocephalic and atraumatic.   Neck: Neck supple. No thyromegaly present.   Cardiovascular: Normal rate, regular rhythm and normal heart sounds. Exam reveals no friction rub.   No murmur heard.  Pulmonary/Chest: Effort normal and breath sounds normal. She has no wheezes. She has no rales.   Abdominal: Soft. Bowel sounds are normal. She exhibits no distension. There is no tenderness.   Lymphadenopathy:     She has no cervical adenopathy.   Neurological: She is alert and oriented to person, place, and time. Gait (antalgic, slight limp on the left) abnormal.   Skin: Skin is warm and dry. No rash noted.   Psychiatric: She has a normal mood and affect. Her speech is normal and behavior is normal. Judgment and thought content normal.   Vitals reviewed.        Assessment:       1. Hypertensive heart disease, unspecified whether heart failure present    2. MARIA R (obstructive sleep apnea)    3. Noncompliance with CPAP treatment    4. Primary osteoarthritis of left knee    5. 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 Long-term use of high-risk medication    7. Screening for blood or protein in urine    8. Familial hypercholesterolemia         Plan:       Hypertensive " heart disease, unspecified whether heart failure present  Comments:  Blood pressure controlled. Will continue to montior.  Labs ordered for future monitoring  Orders:  -     amLODIPine (NORVASC) 5 MG tablet; Take 1 tablet (5 mg total) by mouth once daily.  Dispense: 90 tablet; Refill: 1  -     Comprehensive metabolic panel; Future; Expected date: 09/30/2019  -     Urinalysis; Future; Expected date: 09/30/2019  -     Microalbumin/creatinine urine ratio; Future; Expected date: 09/30/2019  -     CBC auto differential; Future; Expected date: 09/30/2019    MARIA R (obstructive sleep apnea)  Comments:  Encourage pt to try CPAP to prevent adverse cardiovascular events    Noncompliance with CPAP treatment    Primary osteoarthritis of left knee  Comments:  Symptomatic.  Pt to continue to follow with Ortho. 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  Pt to continue allopurinol. Will repeat uric acid to approach goal <6  Orders:  -     Uric acid; Future; Expected date: 09/30/2019    Long-term use of high-risk medication    Screening for blood or protein in urine  -     Urinalysis; Future; Expected date: 09/30/2019  -     Microalbumin/creatinine urine ratio; Future; Expected date: 09/30/2019    Familial hypercholesterolemia  -     Lipid panel; Future; Expected date: 09/30/2019    Other  Lab results discussed and reviewed with patient.    Follow up in about 6 months (around 3/30/2020) for HTN, MARIA R, gout.

## 2019-10-31 ENCOUNTER — TELEPHONE (OUTPATIENT)
Dept: FAMILY MEDICINE | Facility: CLINIC | Age: 74
End: 2019-10-31

## 2019-10-31 DIAGNOSIS — Z12.31 ENCOUNTER FOR SCREENING MAMMOGRAM FOR MALIGNANT NEOPLASM OF BREAST: Primary | ICD-10-CM

## 2019-10-31 DIAGNOSIS — Z12.31 SCREENING MAMMOGRAM, ENCOUNTER FOR: Primary | ICD-10-CM

## 2019-10-31 NOTE — TELEPHONE ENCOUNTER
----- Message from Beth Soto sent at 10/31/2019  2:19 PM CDT -----  Needing orders for a mammo smi  Pt [phone]

## 2019-11-11 ENCOUNTER — TELEPHONE (OUTPATIENT)
Dept: FAMILY MEDICINE | Facility: CLINIC | Age: 74
End: 2019-11-11

## 2019-11-11 DIAGNOSIS — Z12.31 ENCOUNTER FOR SCREENING MAMMOGRAM FOR BREAST CANCER: Primary | ICD-10-CM

## 2019-11-11 NOTE — TELEPHONE ENCOUNTER
----- Message from Radha Brooks sent at 11/11/2019  2:52 PM CST -----  Please fax mmogram orders over to [facility] @[phone]. Pt has an appt. Tomorrow at 12:00. Pt #[phone]

## 2019-11-12 ENCOUNTER — HOSPITAL ENCOUNTER (OUTPATIENT)
Dept: RADIOLOGY | Facility: HOSPITAL | Age: 74
Discharge: HOME | End: 2019-11-12
Attending: FAMILY MEDICINE
Payer: MEDICARE

## 2019-11-12 VITALS — BODY MASS INDEX: 37.6 KG/M2 | HEIGHT: 64 IN | WEIGHT: 220.25 LBS

## 2019-11-12 DIAGNOSIS — Z12.31 ENCOUNTER FOR SCREENING MAMMOGRAM FOR MALIGNANT NEOPLASM OF BREAST: ICD-10-CM

## 2019-11-12 PROCEDURE — 77067 SCR MAMMO BI INCL CAD: CPT | Mod: TC,PO

## 2019-11-19 ENCOUNTER — TELEPHONE (OUTPATIENT)
Dept: FAMILY MEDICINE | Facility: CLINIC | Age: 74
End: 2019-11-19

## 2019-11-19 NOTE — TELEPHONE ENCOUNTER
----- Message from Sofie Leonardo MD sent at 11/17/2019  8:23 PM CST -----  Please let the patient know that her mammogram is normal. To repeat in 1-2 years.

## 2019-12-16 DIAGNOSIS — E78.01 FAMILIAL HYPERCHOLESTEROLEMIA: Primary | ICD-10-CM

## 2019-12-16 RX ORDER — ROSUVASTATIN CALCIUM 20 MG/1
20 TABLET, COATED ORAL NIGHTLY
Qty: 90 TABLET | Refills: 1 | Status: SHIPPED | OUTPATIENT
Start: 2019-12-16 | End: 2020-07-16 | Stop reason: SDUPTHER

## 2019-12-16 NOTE — TELEPHONE ENCOUNTER
----- Message from Radha Brooks sent at 12/16/2019 12:00 PM CST -----  Refill for Rosuvastatin 20 mg. [facility] on [address]. Pt #[phone]

## 2019-12-17 NOTE — TELEPHONE ENCOUNTER
The patient's prescription has been approved and sent to   [id number] - [place] - [address]  [address] [place] [address]  Phone: [phone] Fax: [phone]

## 2020-02-27 ENCOUNTER — TELEPHONE (OUTPATIENT)
Dept: FAMILY MEDICINE | Facility: CLINIC | Age: 75
End: 2020-02-27

## 2020-03-26 LAB
ALBUMIN SERPL-MCNC: 3.9 G/DL (ref 3.6–5.1)
ALBUMIN/CREAT UR: 200 MCG/MG CREAT
ALBUMIN/GLOB SERPL: 1.3 (CALC) (ref 1–2.5)
ALP SERPL-CCNC: 74 U/L (ref 37–153)
ALT SERPL-CCNC: 15 U/L (ref 6–29)
APPEARANCE UR: CLEAR
AST SERPL-CCNC: 18 U/L (ref 10–35)
BASOPHILS # BLD AUTO: 29 CELLS/UL (ref 0–200)
BASOPHILS NFR BLD AUTO: 0.6 %
BILIRUB SERPL-MCNC: 1 MG/DL (ref 0.2–1.2)
BILIRUB UR QL STRIP: NEGATIVE
BUN SERPL-MCNC: 12 MG/DL (ref 7–25)
BUN/CREAT SERPL: NORMAL (CALC) (ref 6–22)
CALCIUM SERPL-MCNC: 9 MG/DL (ref 8.6–10.4)
CHLORIDE SERPL-SCNC: 105 MMOL/L (ref 98–110)
CHOLEST SERPL-MCNC: 124 MG/DL
CHOLEST/HDLC SERPL: 2.1 (CALC)
CO2 SERPL-SCNC: 30 MMOL/L (ref 20–32)
COLOR UR: YELLOW
CREAT SERPL-MCNC: 0.65 MG/DL (ref 0.6–0.93)
CREAT UR-MCNC: 41 MG/DL (ref 20–275)
EOSINOPHIL # BLD AUTO: 178 CELLS/UL (ref 15–500)
EOSINOPHIL NFR BLD AUTO: 3.7 %
ERYTHROCYTE [DISTWIDTH] IN BLOOD BY AUTOMATED COUNT: 12.5 % (ref 11–15)
GFRSERPLBLD MDRD-ARVRAT: 86 ML/MIN/1.73M2
GLOBULIN SER CALC-MCNC: 2.9 G/DL (CALC) (ref 1.9–3.7)
GLUCOSE SERPL-MCNC: 99 MG/DL (ref 65–99)
GLUCOSE UR QL STRIP: NEGATIVE
HCT VFR BLD AUTO: 38.8 % (ref 35–45)
HDLC SERPL-MCNC: 58 MG/DL
HGB BLD-MCNC: 12.6 G/DL (ref 11.7–15.5)
HGB UR QL STRIP: NEGATIVE
KETONES UR QL STRIP: NEGATIVE
LDLC SERPL CALC-MCNC: 52 MG/DL (CALC)
LEUKOCYTE ESTERASE UR QL STRIP: ABNORMAL
LYMPHOCYTES # BLD AUTO: 2030 CELLS/UL (ref 850–3900)
LYMPHOCYTES NFR BLD AUTO: 42.3 %
MCH RBC QN AUTO: 30.3 PG (ref 27–33)
MCHC RBC AUTO-ENTMCNC: 32.5 G/DL (ref 32–36)
MCV RBC AUTO: 93.3 FL (ref 80–100)
MICROALBUMIN UR-MCNC: 8.2 MG/DL
MONOCYTES # BLD AUTO: 446 CELLS/UL (ref 200–950)
MONOCYTES NFR BLD AUTO: 9.3 %
NEUTROPHILS # BLD AUTO: 2117 CELLS/UL (ref 1500–7800)
NEUTROPHILS NFR BLD AUTO: 44.1 %
NITRITE UR QL STRIP: NEGATIVE
NONHDLC SERPL-MCNC: 66 MG/DL (CALC)
PH UR STRIP: 7.5 [PH] (ref 5–8)
PLATELET # BLD AUTO: 221 THOUSAND/UL (ref 140–400)
PMV BLD REES-ECKER: 11.7 FL (ref 7.5–12.5)
POTASSIUM SERPL-SCNC: 3.6 MMOL/L (ref 3.5–5.3)
PROT SERPL-MCNC: 6.8 G/DL (ref 6.1–8.1)
PROT UR QL STRIP: ABNORMAL
RBC # BLD AUTO: 4.16 MILLION/UL (ref 3.8–5.1)
SODIUM SERPL-SCNC: 144 MMOL/L (ref 135–146)
SP GR UR STRIP: 1.01 (ref 1–1.03)
TRIGL SERPL-MCNC: 59 MG/DL
URATE SERPL-MCNC: 6.1 MG/DL (ref 2.5–7)
WBC # BLD AUTO: 4.8 THOUSAND/UL (ref 3.8–10.8)

## 2020-03-27 DIAGNOSIS — M1A.4710 CHRONIC GOUT DUE TO OTHER SECONDARY CAUSE INVOLVING TOE OF RIGHT FOOT WITHOUT TOPHUS: Primary | ICD-10-CM

## 2020-03-27 DIAGNOSIS — I11.9 HYPERTENSIVE HEART DISEASE, UNSPECIFIED WHETHER HEART FAILURE PRESENT: ICD-10-CM

## 2020-03-27 RX ORDER — HYDROCHLOROTHIAZIDE 25 MG/1
25 TABLET ORAL NIGHTLY
Qty: 90 TABLET | Refills: 1 | Status: SHIPPED | OUTPATIENT
Start: 2020-03-27 | End: 2020-10-08 | Stop reason: SDUPTHER

## 2020-03-27 RX ORDER — ALLOPURINOL 100 MG/1
100 TABLET ORAL NIGHTLY
Qty: 90 TABLET | Refills: 1 | Status: SHIPPED | OUTPATIENT
Start: 2020-03-27 | End: 2020-06-25

## 2020-03-27 NOTE — TELEPHONE ENCOUNTER
The patient's prescription has been approved and sent to   [facility] - [place] - [address]  [address] [place] [address]  Phone: [phone] Fax: [phone]

## 2020-03-27 NOTE — TELEPHONE ENCOUNTER
Spoke with pt - pt states that she needs refills of allopurinol and hydrochlorathiazide to [facility] [address]. DIMPLE

## 2020-03-27 NOTE — TELEPHONE ENCOUNTER
----- Message from Krystian Vinson sent at 3/27/2020 11:28 AM CDT -----  Contact: Tierra Taylor  Needs a refill on Hydrocodone and Allopurinol  Send to [facility] [address]  Pt# [phone]

## 2020-05-08 ENCOUNTER — OFFICE VISIT (OUTPATIENT)
Dept: FAMILY MEDICINE | Facility: CLINIC | Age: 75
End: 2020-05-08
Payer: MEDICARE

## 2020-05-08 DIAGNOSIS — G47.33 OSA (OBSTRUCTIVE SLEEP APNEA): ICD-10-CM

## 2020-05-08 DIAGNOSIS — Z78.0 POST-MENOPAUSE: ICD-10-CM

## 2020-05-08 DIAGNOSIS — E78.2 MIXED HYPERLIPIDEMIA: ICD-10-CM

## 2020-05-08 DIAGNOSIS — M17.12 PRIMARY OSTEOARTHRITIS OF LEFT KNEE: ICD-10-CM

## 2020-05-08 DIAGNOSIS — M1A.4710 CHRONIC GOUT DUE TO OTHER SECONDARY CAUSE INVOLVING TOE OF RIGHT FOOT WITHOUT TOPHUS: ICD-10-CM

## 2020-05-08 DIAGNOSIS — I11.9 HYPERTENSIVE HEART DISEASE, UNSPECIFIED WHETHER HEART FAILURE PRESENT: Primary | ICD-10-CM

## 2020-05-08 DIAGNOSIS — R80.9 MICROALBUMINURIA: ICD-10-CM

## 2020-05-08 DIAGNOSIS — Z91.199 NONCOMPLIANCE WITH CPAP TREATMENT: ICD-10-CM

## 2020-05-08 PROCEDURE — 1159F MED LIST DOCD IN RCRD: CPT | Mod: 95,,, | Performed by: FAMILY MEDICINE

## 2020-05-08 PROCEDURE — 99441 PR PHYSICIAN TELEPHONE EVALUATION 5-10 MIN: ICD-10-PCS | Mod: 95,,, | Performed by: FAMILY MEDICINE

## 2020-05-08 PROCEDURE — 1159F PR MEDICATION LIST DOCUMENTED IN MEDICAL RECORD: ICD-10-PCS | Mod: 95,,, | Performed by: FAMILY MEDICINE

## 2020-05-08 PROCEDURE — 1101F PT FALLS ASSESS-DOCD LE1/YR: CPT | Mod: 95,,, | Performed by: FAMILY MEDICINE

## 2020-05-08 PROCEDURE — 1101F PR PT FALLS ASSESS DOC 0-1 FALLS W/OUT INJ PAST YR: ICD-10-PCS | Mod: 95,,, | Performed by: FAMILY MEDICINE

## 2020-05-08 PROCEDURE — 99441 PR PHYSICIAN TELEPHONE EVALUATION 5-10 MIN: CPT | Mod: 95,,, | Performed by: FAMILY MEDICINE

## 2020-05-08 NOTE — PROGRESS NOTES
Subjective:        The chief complaint leading to consultation is: HTN, Insmonia, MARIA R, OA  The patient location is:  Home  Visit type: Virtual visit with synchronous audio/video or audio only  This was a phone conversation in lieu of in-person visit due to the coronavirus emergency. Patient acknowledged and agreed to the telephone encounter.     Pt here to checkup on HTN, Insomnia, MARIA R, OA.      Has a blood pressure cuff at home. Denies CP/SOB/HA. Took BP last week, 138/70.  Continues to see Dr. Arita every 6 months, has an appt next month    Continues to have left knee pain.  Sometimes her entire body is sore, takes aleve and it will go away, and it will work for her knee as well.    No gout flares since last visit.  Uric acid is normal this time. No crustaceans this spring.    Had labs done. LDL is 52. Some microalbuminuria.    Noncompliant with CPAP due claustrophobia.  Complaining of daytime sleepiness.  Daughters tell her that she snores.      No visits with results within 6 Month(s) from this visit.   Latest known visit with results is:   Office Visit on 09/30/2019   Component Date Value Ref Range Status    Uric Acid 03/25/2020 6.1  2.5 - 7.0 mg/dL Final    Glucose 03/25/2020 99  65 - 99 mg/dL Final    BUN, Bld 03/25/2020 12  7 - 25 mg/dL Final    Creatinine 03/25/2020 0.65  0.60 - 0.93 mg/dL Final    eGFR if non African American 03/25/2020 86  > OR = 60 mL/min/1.73m2 Final    eGFR if African American 03/25/2020 100  > OR = 60 mL/min/1.73m2 Final    BUN/Creatinine Ratio 03/25/2020 NOT APPLICABLE  6 - 22 (calc) Final    Sodium 03/25/2020 144  135 - 146 mmol/L Final    Potassium 03/25/2020 3.6  3.5 - 5.3 mmol/L Final    Chloride 03/25/2020 105  98 - 110 mmol/L Final    CO2 03/25/2020 30  20 - 32 mmol/L Final    Calcium 03/25/2020 9.0  8.6 - 10.4 mg/dL Final    Total Protein 03/25/2020 6.8  6.1 - 8.1 g/dL Final    Albumin 03/25/2020 3.9  3.6 - 5.1 g/dL Final    Globulin, Total 03/25/2020 2.9   1.9 - 3.7 g/dL (calc) Final    Albumin/Globulin Ratio 03/25/2020 1.3  1.0 - 2.5 (calc) Final    Total Bilirubin 03/25/2020 1.0  0.2 - 1.2 mg/dL Final    Alkaline Phosphatase 03/25/2020 74  37 - 153 U/L Final    AST 03/25/2020 18  10 - 35 U/L Final    ALT 03/25/2020 15  6 - 29 U/L Final    Cholesterol 03/25/2020 124  <200 mg/dL Final    HDL 03/25/2020 58  > OR = 50 mg/dL Final    Triglycerides 03/25/2020 59  <150 mg/dL Final    LDL Cholesterol 03/25/2020 52  mg/dL (calc) Final    Hdl/Cholesterol Ratio 03/25/2020 2.1  <5.0 (calc) Final    Non HDL Chol. (LDL+VLDL) 03/25/2020 66  <130 mg/dL (calc) Final    Color, UA 03/25/2020 YELLOW  YELLOW Final    Appearance, UA 03/25/2020 CLEAR  CLEAR Final    Specific [place], UA 03/25/2020 1.011  1.001 - 1.035 Final    pH, UA 03/25/2020 7.5  5.0 - 8.0 Final    Glucose, UA 03/25/2020 NEGATIVE  NEGATIVE Final    Bilirubin, UA 03/25/2020 NEGATIVE  NEGATIVE Final    Ketones, UA 03/25/2020 NEGATIVE  NEGATIVE Final    Occult Blood UA 03/25/2020 NEGATIVE  NEGATIVE Final    Protein, UA 03/25/2020 TRACE* NEGATIVE Final    Nitrite, UA 03/25/2020 NEGATIVE  NEGATIVE Final    Leukocytes, UA 03/25/2020 1+* NEGATIVE Final    Creatinine, Random Ur 03/25/2020 41  20 - 275 mg/dL Final    Microalb, Ur 03/25/2020 8.2  See Note: mg/dL Final    Microalb Creat Ratio 03/25/2020 200* <30 mcg/mg creat Final    WBC 03/25/2020 4.8  3.8 - 10.8 Thousand/uL Final    RBC 03/25/2020 4.16  3.80 - 5.10 Million/uL Final    Hemoglobin 03/25/2020 12.6  11.7 - 15.5 g/dL Final    Hematocrit 03/25/2020 38.8  35.0 - 45.0 % Final    Mean Corpuscular Volume 03/25/2020 93.3  80.0 - 100.0 fL Final    Mean Corpuscular Hemoglobin 03/25/2020 30.3  27.0 - 33.0 pg Final    Mean Corpuscular Hemoglobin Conc 03/25/2020 32.5  32.0 - 36.0 g/dL Final    RDW 03/25/2020 12.5  11.0 - 15.0 % Final    Platelets 03/25/2020 221  140 - 400 Thousand/uL Final    MPV 03/25/2020 11.7  7.5 - 12.5 fL Final     Neutrophils Absolute 03/25/2020 2,117  1,500 - 7,800 cells/uL Final    Lymph # 03/25/2020 2,030  850 - 3,900 cells/uL Final    Mono # 03/25/2020 446  200 - 950 cells/uL Final    Eos # 03/25/2020 178  15 - 500 cells/uL Final    Baso # 03/25/2020 29  0 - 200 cells/uL Final    Neutrophils Relative 03/25/2020 44.1  % Final    Lymph% 03/25/2020 42.3  % Final    Mono% 03/25/2020 9.3  % Final    Eosinophil% 03/25/2020 3.7  % Final    Basophil% 03/25/2020 0.6  % Final         Past Surgical History:   Procedure Laterality Date    BREAST BIOPSY      CERVICAL DISCECTOMY      FATTY TUMOR      HYSTERECTOMY      KNEE SURGERY       Past Medical History:   Diagnosis Date    Arthritis     Hypertension     Sleep apnea      Family History   Problem Relation Age of Onset    Hypertension Mother     Hypertension Father         Social History:   Marital Status: Single  Alcohol History:  reports that she does not drink alcohol.  Tobacco History:  reports that she has never smoked. She has never used smokeless tobacco.  Drug History:  reports that she does not use drugs.    Review of patient's allergies indicates:   Allergen Reactions    Lisinopril Other (See Comments)     Cough    Penicillins     Tramadol      causes itching       Current Outpatient Medications   Medication Sig Dispense Refill    allopurinoL (ZYLOPRIM) 100 MG tablet Take 1 tablet (100 mg total) by mouth every evening. 90 tablet 1    amLODIPine (NORVASC) 5 MG tablet Take 1 tablet (5 mg total) by mouth once daily. 90 tablet 1    aspirin 81 MG Chew Take 81 mg by mouth every evening.       carvedilol (COREG) 12.5 MG tablet Take 12.5 mg by mouth 2 (two) times daily.       hydroCHLOROthiazide (HYDRODIURIL) 25 MG tablet Take 1 tablet (25 mg total) by mouth every evening. 90 tablet 1    mometasone (NASONEX) 50 mcg/actuation nasal spray 2 sprays in each nostril      rosuvastatin (CRESTOR) 20 MG tablet Take 1 tablet (20 mg total) by mouth every evening.  90 tablet 1    ibuprofen (ADVIL,MOTRIN) 200 MG tablet Take 200 mg by mouth every 6 (six) hours as needed for Pain.      naproxen sodium (ANAPROX) 220 MG tablet Take 220 mg by mouth 2 (two) times daily with meals.       No current facility-administered medications for this visit.      Facility-Administered Medications Ordered in Other Visits   Medication Dose Route Frequency Provider Last Rate Last Dose    lactated ringers infusion   Intravenous Continuous Marck Davies MD 10 mL/hr at 08/20/19 0730      lidocaine (PF) 10 mg/ml (1%) injection 10 mg  1 mL Intradermal Once Marck Davies MD           Review of Systems   Constitutional: Negative for appetite change, fatigue, fever and unexpected weight change.   Respiratory: Negative for cough, chest tightness, shortness of breath and wheezing.    Cardiovascular: Negative for chest pain and leg swelling.   Gastrointestinal: Negative for abdominal pain, constipation, nausea and vomiting.   Genitourinary: Negative for difficulty urinating, dysuria, frequency and menstrual problem.   Musculoskeletal: Positive for arthralgias (knees). Negative for back pain, myalgias and neck pain.   Skin: Negative for rash.   Neurological: Negative for dizziness, weakness, numbness and headaches.   Hematological: Does not bruise/bleed easily.   Psychiatric/Behavioral: Negative for dysphoric mood, sleep disturbance and suicidal ideas. The patient is not nervous/anxious.          Objective:        Physical Exam:   Physical Exam         Assessment:       1. Hypertensive heart disease, unspecified whether heart failure present    2. Mixed hyperlipidemia    3. 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 Primary osteoarthritis of left knee    5. MARIA R (obstructive sleep apnea)    6. Noncompliance with CPAP treatment    7. Post-menopause      Plan:   Hypertensive heart disease, unspecified whether heart failure present  Comments:  Controlled.  To continue current medication regimen. Will continue to monitor  Orders:  -     Microalbumin/creatinine urine ratio; Future; Expected date: 05/08/2020    Mixed hyperlipidemia  Comments:  Controlled. LDL is 52. To continue statin.     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 Will continue to screen for flareups.    Primary osteoarthritis of left knee  Comments:  Stable. Pain controlled with prn NSAID. Will continue to monitor symptoms.    MARIA R (obstructive sleep apnea)  Comments:  Pt explained importance of CPAP use in cardiopulnary health.    Noncompliance with CPAP treatment    Post-menopause  Comments:  DEXA order sent to [facility]  Orders:  -     DXA Bone Density Spine And Hip; Future; Expected date: 05/08/2020    Other  Lab results discussed and reviewed with patient.  Labs have been ordered for monitoring of chronic conditions,to be done at visit.    Follow up in about 6 months (around 11/8/2020) for HTN, Arthritis, Gout.    Total time spent with patient: 10 min    Each patient to whom he or she provides medical services by telemedicine is:  (1) informed of the relationship between the physician and patient and the respective role of any other health care provider with respect to management of the patient; and (2) notified that he or she may decline to receive medical services by telemedicine and may withdraw from such care at any time.    This note was created using [employer]39 voice recognition software that occasionally misinterprets phrases or words.

## 2020-05-22 ENCOUNTER — HOSPITAL ENCOUNTER (OUTPATIENT)
Dept: RADIOLOGY | Facility: HOSPITAL | Age: 75
Discharge: HOME | End: 2020-05-22
Attending: FAMILY MEDICINE
Payer: MEDICARE

## 2020-05-22 DIAGNOSIS — Z78.0 POST-MENOPAUSE: ICD-10-CM

## 2020-05-22 PROCEDURE — 77080 DXA BONE DENSITY AXIAL: CPT | Mod: TC,PO

## 2020-05-27 ENCOUNTER — TELEPHONE (OUTPATIENT)
Dept: FAMILY MEDICINE | Facility: CLINIC | Age: 75
End: 2020-05-27

## 2020-05-27 DIAGNOSIS — M85.80 OSTEOPENIA: Primary | ICD-10-CM

## 2020-05-27 RX ORDER — ALENDRONATE SODIUM 70 MG/1
70 TABLET ORAL
Qty: 4 TABLET | Refills: 5 | Status: SHIPPED | OUTPATIENT
Start: 2020-05-27 | End: 2020-11-16 | Stop reason: SDUPTHER

## 2020-05-27 RX ORDER — MULTIVITAMIN
1 TABLET ORAL 2 TIMES DAILY
COMMUNITY

## 2020-05-27 NOTE — TELEPHONE ENCOUNTER
Spoke to patient with results verbatim per Dr Leonardo. Verbalized understanding on all. Fosamax order pended. Citrical added to med list. Allergies and pharmacy verified.

## 2020-05-27 NOTE — TELEPHONE ENCOUNTER
----- Message from Sofie Leonardo MD sent at 5/26/2020  6:41 PM CDT -----  Please call the patient regarding her abnormal result.     1. Osteopenia of the left hip.  It has decreased 9% since her last test. She may want to consider taking a med to strengthen her bones, a bisphosphonate, like fosamax 70mg weekly, to help prevent fractures.  Should also be taking citrical 600mg  plus 400 IU D bid with meals. Weight bearing exercises are also recommended if tolerated.      2. Normal density of lumbar spine.

## 2020-06-10 ENCOUNTER — TELEPHONE (OUTPATIENT)
Dept: FAMILY MEDICINE | Facility: CLINIC | Age: 75
End: 2020-06-10

## 2020-06-10 DIAGNOSIS — N64.4 BREAST PAIN, RIGHT: Primary | ICD-10-CM

## 2020-06-10 NOTE — TELEPHONE ENCOUNTER
"Spoke with pt - pt states that she has been having shooting pain in her right breast around the nipple where she had "that" procedure done to her nipple. Pt states that she has taken aleve and it has eased the pain but she wants to have her mammogram done now. DIMPLE  "

## 2020-06-10 NOTE — TELEPHONE ENCOUNTER
Call pt and make sure that she is not consuming any caffienated products, don't forget about tea, chocolate

## 2020-06-10 NOTE — TELEPHONE ENCOUNTER
----- Message from Beth Soto sent at 6/10/2020  9:49 AM CDT -----  Pt is annual mammo is July put pt is having shooting pains in the right breast and wants to get a mammo on it this month   [facility]   Pt [phone]

## 2020-06-11 NOTE — TELEPHONE ENCOUNTER
Spoke with pt - Pt informed that her mammogram order was sent to [facility] yesterday. Pt informed that per Dr. Leonardo she shouldn't be consuming any caffienated products including teas and chocolates. Pt states that she will stop because she has been. Pt verbalized an understanding to our conversation. DIMPLE

## 2020-06-23 ENCOUNTER — TELEPHONE (OUTPATIENT)
Dept: FAMILY MEDICINE | Facility: CLINIC | Age: 75
End: 2020-06-23

## 2020-06-23 ENCOUNTER — HOSPITAL ENCOUNTER (OUTPATIENT)
Dept: RADIOLOGY | Facility: HOSPITAL | Age: 75
Discharge: HOME | End: 2020-06-23
Attending: FAMILY MEDICINE
Payer: MEDICARE

## 2020-06-23 VITALS — HEIGHT: 64 IN | BODY MASS INDEX: 37.6 KG/M2 | WEIGHT: 220.25 LBS

## 2020-06-23 DIAGNOSIS — N64.4 BREAST PAIN, RIGHT: ICD-10-CM

## 2020-06-23 PROCEDURE — 76642 ULTRASOUND BREAST LIMITED: CPT | Mod: TC,PO,RT

## 2020-06-23 PROCEDURE — 77061 BREAST TOMOSYNTHESIS UNI: CPT | Mod: TC,PO

## 2020-06-23 PROCEDURE — 77065 DX MAMMO INCL CAD UNI: CPT | Mod: TC,PO

## 2020-06-23 NOTE — TELEPHONE ENCOUNTER
----- Message from Sofie Leonardo MD sent at 6/23/2020  3:43 PM CDT -----  Please call the patient regarding her abnormal mammogram w/ right breast u/s result.    1. Scattered fibroglandular densities.  There are nodular asymmetries in the right breast retroareolar region laterally on the nipple in profile view, unchanged from prior mammograms  2. Probably benign exam.  Focally dilated ducts containing echogenic debris in the right breast retroareolar region have stable mammographic appearance over multiple prior exams.  A follow-up right breast diagnostic ultrasound in 6 months is recommended.    ##Recommend RIGHT DIAG breast U/S in 6 months.

## 2020-07-16 DIAGNOSIS — E78.01 FAMILIAL HYPERCHOLESTEROLEMIA: ICD-10-CM

## 2020-07-16 DIAGNOSIS — R92.8 ABNORMAL MAMMOGRAM: Primary | ICD-10-CM

## 2020-07-16 RX ORDER — CARVEDILOL 12.5 MG/1
12.5 TABLET ORAL 2 TIMES DAILY
Qty: 180 TABLET | Refills: 1 | Status: SHIPPED | OUTPATIENT
Start: 2020-07-16 | End: 2020-12-02 | Stop reason: SDUPTHER

## 2020-07-16 RX ORDER — ROSUVASTATIN CALCIUM 20 MG/1
20 TABLET, COATED ORAL NIGHTLY
Qty: 90 TABLET | Refills: 1 | Status: SHIPPED | OUTPATIENT
Start: 2020-07-16 | End: 2020-12-02 | Stop reason: SDUPTHER

## 2020-07-16 NOTE — TELEPHONE ENCOUNTER
This prescription has been approved and sent to   [id number] - [place] - [address]  [address] [place] [address]  Phone: [phone] Fax: [phone]

## 2020-07-16 NOTE — TELEPHONE ENCOUNTER
----- Message from Radha Brooks sent at 7/16/2020 10:05 AM CDT -----  Refill for Rosuvastatin, Carvedilol. [address]. Pt #[phone]

## 2020-07-29 DIAGNOSIS — I11.9 HYPERTENSIVE HEART DISEASE, UNSPECIFIED WHETHER HEART FAILURE PRESENT: ICD-10-CM

## 2020-07-29 RX ORDER — AMLODIPINE BESYLATE 5 MG/1
5 TABLET ORAL DAILY
Qty: 90 TABLET | Refills: 1 | Status: SHIPPED | OUTPATIENT
Start: 2020-07-29 | End: 2020-12-02 | Stop reason: SDUPTHER

## 2020-07-29 NOTE — TELEPHONE ENCOUNTER
The patient's prescription has been approved and sent to   [facility] [id number] - [place] - [address]  [address] [place] [address]  Phone: [phone] Fax: [phone]

## 2020-10-08 ENCOUNTER — OFFICE VISIT (OUTPATIENT)
Dept: ORTHOPEDICS | Facility: CLINIC | Age: 75
End: 2020-10-08
Payer: MEDICARE

## 2020-10-08 VITALS
HEIGHT: 64 IN | HEART RATE: 88 BPM | SYSTOLIC BLOOD PRESSURE: 142 MMHG | DIASTOLIC BLOOD PRESSURE: 80 MMHG | WEIGHT: 224 LBS | BODY MASS INDEX: 38.24 KG/M2

## 2020-10-08 DIAGNOSIS — M17.12 PRIMARY OSTEOARTHRITIS OF LEFT KNEE: Primary | ICD-10-CM

## 2020-10-08 DIAGNOSIS — M17.11 PRIMARY OSTEOARTHRITIS OF RIGHT KNEE: ICD-10-CM

## 2020-10-08 DIAGNOSIS — I11.9 HYPERTENSIVE HEART DISEASE, UNSPECIFIED WHETHER HEART FAILURE PRESENT: ICD-10-CM

## 2020-10-08 PROCEDURE — 1101F PT FALLS ASSESS-DOCD LE1/YR: CPT | Mod: S$GLB,,, | Performed by: ORTHOPAEDIC SURGERY

## 2020-10-08 PROCEDURE — 1125F PR PAIN SEVERITY QUANTIFIED, PAIN PRESENT: ICD-10-PCS | Mod: S$GLB,,, | Performed by: ORTHOPAEDIC SURGERY

## 2020-10-08 PROCEDURE — 3077F PR MOST RECENT SYSTOLIC BLOOD PRESSURE >= 140 MM HG: ICD-10-PCS | Mod: S$GLB,,, | Performed by: ORTHOPAEDIC SURGERY

## 2020-10-08 PROCEDURE — 20610 DRAIN/INJ JOINT/BURSA W/O US: CPT | Mod: 50,S$GLB,, | Performed by: ORTHOPAEDIC SURGERY

## 2020-10-08 PROCEDURE — 3077F SYST BP >= 140 MM HG: CPT | Mod: S$GLB,,, | Performed by: ORTHOPAEDIC SURGERY

## 2020-10-08 PROCEDURE — 3079F DIAST BP 80-89 MM HG: CPT | Mod: S$GLB,,, | Performed by: ORTHOPAEDIC SURGERY

## 2020-10-08 PROCEDURE — 3079F PR MOST RECENT DIASTOLIC BLOOD PRESSURE 80-89 MM HG: ICD-10-PCS | Mod: S$GLB,,, | Performed by: ORTHOPAEDIC SURGERY

## 2020-10-08 PROCEDURE — 1159F MED LIST DOCD IN RCRD: CPT | Mod: S$GLB,,, | Performed by: ORTHOPAEDIC SURGERY

## 2020-10-08 PROCEDURE — 1101F PR PT FALLS ASSESS DOC 0-1 FALLS W/OUT INJ PAST YR: ICD-10-PCS | Mod: S$GLB,,, | Performed by: ORTHOPAEDIC SURGERY

## 2020-10-08 PROCEDURE — 99213 OFFICE O/P EST LOW 20 MIN: CPT | Mod: 25,S$GLB,, | Performed by: ORTHOPAEDIC SURGERY

## 2020-10-08 PROCEDURE — 1125F AMNT PAIN NOTED PAIN PRSNT: CPT | Mod: S$GLB,,, | Performed by: ORTHOPAEDIC SURGERY

## 2020-10-08 PROCEDURE — 20610 LARGE JOINT ASPIRATION/INJECTION: R KNEE: ICD-10-PCS | Mod: 50,S$GLB,, | Performed by: ORTHOPAEDIC SURGERY

## 2020-10-08 PROCEDURE — 1159F PR MEDICATION LIST DOCUMENTED IN MEDICAL RECORD: ICD-10-PCS | Mod: S$GLB,,, | Performed by: ORTHOPAEDIC SURGERY

## 2020-10-08 PROCEDURE — 99213 PR OFFICE/OUTPT VISIT, EST, LEVL III, 20-29 MIN: ICD-10-PCS | Mod: 25,S$GLB,, | Performed by: ORTHOPAEDIC SURGERY

## 2020-10-08 RX ORDER — BIMATOPROST 0.3 MG/ML
1 SOLUTION/ DROPS OPHTHALMIC NIGHTLY
COMMUNITY
End: 2021-06-02

## 2020-10-08 RX ORDER — ALLOPURINOL 100 MG/1
TABLET ORAL
COMMUNITY
Start: 2018-08-01 | End: 2020-12-02 | Stop reason: SDUPTHER

## 2020-10-08 RX ORDER — METHYLPREDNISOLONE ACETATE 40 MG/ML
40 INJECTION, SUSPENSION INTRA-ARTICULAR; INTRALESIONAL; INTRAMUSCULAR; SOFT TISSUE
Status: DISCONTINUED | OUTPATIENT
Start: 2020-10-08 | End: 2020-10-08 | Stop reason: HOSPADM

## 2020-10-08 RX ADMIN — METHYLPREDNISOLONE ACETATE 40 MG: 40 INJECTION, SUSPENSION INTRA-ARTICULAR; INTRALESIONAL; INTRAMUSCULAR; SOFT TISSUE at 03:10

## 2020-10-08 NOTE — PROCEDURES
Large Joint Aspiration/Injection: R knee    Date/Time: 10/8/2020 3:15 PM  Performed by: Mathtew Marquez MD  Authorized by: Matthew Marquez MD     Consent Done?:  Yes (Verbal)  Indications:  Pain  Site marked: the procedure site was marked    Timeout: prior to procedure the correct patient, procedure, and site was verified    Prep: patient was prepped and draped in usual sterile fashion      Local anesthesia used?: Yes    Local anesthetic:  Lidocaine 1% without epinephrine    Details:  Needle Size:  25 G  Ultrasonic Guidance for needle placement?: No    Location:  Knee  Site:  R knee  Medications:  40 mg methylPREDNISolone acetate 40 mg/mL  Patient tolerance:  Patient tolerated the procedure well with no immediate complications    Large Joint Aspiration/Injection: L knee    Date/Time: 10/8/2020 3:15 PM  Performed by: Matthew Marquez MD  Authorized by: Matthew Marquez MD     Consent Done?:  Yes (Verbal)  Indications:  Pain  Site marked: the procedure site was marked    Timeout: prior to procedure the correct patient, procedure, and site was verified    Prep: patient was prepped and draped in usual sterile fashion      Local anesthesia used?: Yes    Local anesthetic:  Lidocaine 1% without epinephrine    Details:  Needle Size:  25 G  Ultrasonic Guidance for needle placement?: No    Location:  Knee  Site:  L knee  Medications:  40 mg methylPREDNISolone acetate 40 mg/mL  Patient tolerance:  Patient tolerated the procedure well with no immediate complications

## 2020-10-08 NOTE — PROGRESS NOTES
[facility]    Subjective:     Chief Complaint:   Chief Complaint   Patient presents with    Left Knee - Pain     Left knee pain x a while. States that she is here to see about getting injections today. Pain is constant and gets worse with activity, hard to walk long distances.     Right Knee - Pain     Right knee pain x a while. States that she is here to see about getting injections today. Pain is constant and gets worse with activity, hard to walk long distances.        Past Medical History:   Diagnosis Date    Arthritis     Hypertension     Sleep apnea        Past Surgical History:   Procedure Laterality Date    BREAST BIOPSY      CERVICAL DISCECTOMY      FATTY TUMOR      HYSTERECTOMY      KNEE SURGERY         Current Outpatient Medications   Medication Sig    alendronate (FOSAMAX) 70 MG tablet Take 1 tablet (70 mg total) by mouth every 7 days. For osteopenia    allopurinoL (ZYLOPRIM) 100 MG tablet 1 tablet    amLODIPine (NORVASC) 5 MG tablet Take 1 tablet (5 mg total) by mouth once daily.    aspirin 81 MG Chew Take 81 mg by mouth every evening.     bimatoprost (LUMIGAN) 0.03 % ophthalmic drops 1 drop every evening.    calcium-vitamin D (CALCIUM 600 + D,3,) 600 mg(1,500mg) -400 unit Tab Take 1 tablet by mouth 2 (two) times a day.    carvediloL (COREG) 12.5 MG tablet Take 1 tablet (12.5 mg total) by mouth 2 (two) times daily.    naproxen sodium (ANAPROX) 220 MG tablet Take 220 mg by mouth 2 (two) times daily with meals.    rosuvastatin (CRESTOR) 20 MG tablet Take 1 tablet (20 mg total) by mouth every evening.    hydroCHLOROthiazide (HYDRODIURIL) 25 MG tablet Take 1 tablet (25 mg total) by mouth every evening.    ibuprofen (ADVIL,MOTRIN) 200 MG tablet Take 200 mg by mouth every 6 (six) hours as needed for Pain.    mometasone (NASONEX) 50 mcg/actuation nasal spray 2 sprays in each nostril     No current facility-administered medications for this visit.      Facility-Administered  Medications Ordered in Other Visits   Medication    lactated ringers infusion    lidocaine (PF) 10 mg/ml (1%) injection 10 mg       Review of patient's allergies indicates:   Allergen Reactions    Lisinopril Other (See Comments)     Cough    Penicillins     Tramadol      causes itching       Family History   Problem Relation Age of Onset    Hypertension Mother     Hypertension Father        Social History     Socioeconomic History    Marital status: Single     Spouse name: Not on file    Number of children: Not on file    Years of education: Not on file    Highest education level: Not on file   Occupational History    Not on file   Social Needs    Financial resource strain: Not on file    Food insecurity     Worry: Not on file     Inability: Not on file    Transportation needs     Medical: Not on file     Non-medical: Not on file   Tobacco Use    Smoking status: Never Smoker    Smokeless tobacco: Never Used   Substance and Sexual Activity    Alcohol use: No    Drug use: No    Sexual activity: Not on file   Lifestyle    Physical activity     Days per week: Not on file     Minutes per session: Not on file    Stress: Not on file   Relationships    Social connections     Talks on phone: Not on file     Gets together: Not on file     Attends Jehovah's witness service: Not on file     Active member of club or organization: Not on file     Attends meetings of clubs or organizations: Not on file     Relationship status: Not on file   Other Topics Concern    Not on file   Social History Narrative    Not on file       History of present illness:  Patient returns to clinic today for her bilateral knees, she has been seeing Dr. Romero.  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  She was scheduled to have a knee replacement back in August of last year however this was canceled and the patient states because her kidney function was bad.  I do not completely understand from the medical  records review her BUN and creatinine has been normal she did have an elevated urine creatinine this is somewhat scared her out of getting her knees replaced.  She knows she has severe arthritis and she requests injections in her bilateral knees today.      Review of Systems:    Constitution: Negative for chills, fever, and sweats.  Negative for unexplained weight loss.    HENT:  Negative for headaches and blurry vision.    Cardiovascular:Negative for chest pain or irregular heart beat. Negative for hypertension.    Respiratory:  Negative for cough and shortness of breath.    Gastrointestinal: Negative for abdominal pain, heartburn, melena, nausea, and vomitting.    Genitourinary:  Negative bladder incontinence and dysuria.    Musculoskeletal:  See HPI for details.     Neurological: Negative for numbness.    Psychiatric/Behavioral: Negative for depression.  The patient is not nervous/anxious.      Endocrine: Negative for polyuria    Hematologic/Lymphatic: Negative for bleeding problem.  Does not bruise/bleed easily.    Skin: Negative for poor would healing and rash    Objective:      Physical Examination:    Vital Signs:    Vitals:    10/08/20 1516   BP: (!) 142/80   Pulse: 88       Body mass index is 38.45 kg/m².    This a well-developed, well nourished patient in no acute distress.  They are alert and oriented and cooperative to examination.        Examination of the bilateral knees, the patient has bilateral patellofemoral crepitus, she has pain with grind testing bilaterally as well as tenderness over the medial joint lines.  She has good range of motion, 0-110 degrees.  Stable to anterior posterior varus and valgus stresses.  Pertinent New Results:    XRAY Report / Interpretation:   AP lateral sunrise views of the bilateral knees taken today in the office demonstrate severe tricompartmental arthritis of the bilateral knees.    Assessment/Plan:      Patient with advanced arthritis of the bilateral knees.  We  "injected both knees today with lidocaine and Depo-Medrol.  I think the patient would be an excellent candidate for knee replacement.  I encouraged her to talk to her primary care doctor to see if they would clear her for surgery again.  Will have her follow-up in a month to see how she is doing.    Rajan Solo PA-C served as a scribe for this patient encounter. A "face to face" encounter occured with Dr. Matthew Marquez on this date. The treatment plan and medical decision making are outlined as above:      This note was created using Dragon voice recognition software that occasionally misinterpreted phrases or words.          "

## 2020-10-08 NOTE — TELEPHONE ENCOUNTER
----- Message from Alvina Barajas sent at 10/8/2020 12:20 PM CDT -----  Pt calling refills on Allopurinol and Hydrochlorothiazide to [facility] [address]  # [phone]

## 2020-10-12 RX ORDER — ALLOPURINOL 100 MG/1
100 TABLET ORAL DAILY
Qty: 90 TABLET | Refills: 1 | Status: SHIPPED | OUTPATIENT
Start: 2020-10-12 | End: 2021-01-10

## 2020-10-12 RX ORDER — HYDROCHLOROTHIAZIDE 25 MG/1
25 TABLET ORAL NIGHTLY
Qty: 90 TABLET | Refills: 1 | Status: SHIPPED | OUTPATIENT
Start: 2020-10-12 | End: 2021-04-15 | Stop reason: SDUPTHER

## 2020-10-12 NOTE — TELEPHONE ENCOUNTER
The patient's prescription has been approved and sent to   [facility] [address] - [place] - [address]  [address] [place] [address]  Phone: [phone] Fax: [phone]

## 2020-11-16 DIAGNOSIS — M85.80 OSTEOPENIA: ICD-10-CM

## 2020-11-16 NOTE — TELEPHONE ENCOUNTER
----- Message from Krystian Vinson sent at 11/16/2020  2:01 PM CST -----  Regarding: Refill  Contact: Tierra loo  Needs refill on Alendronate  Send to [facility] [address]  Pt# [phone]

## 2020-11-17 RX ORDER — ALENDRONATE SODIUM 70 MG/1
70 TABLET ORAL
Qty: 4 TABLET | Refills: 0 | Status: SHIPPED | OUTPATIENT
Start: 2020-11-17 | End: 2020-12-02 | Stop reason: SDUPTHER

## 2020-11-24 ENCOUNTER — TELEPHONE (OUTPATIENT)
Dept: FAMILY MEDICINE | Facility: CLINIC | Age: 75
End: 2020-11-24

## 2020-11-27 ENCOUNTER — TELEPHONE (OUTPATIENT)
Dept: FAMILY MEDICINE | Facility: CLINIC | Age: 75
End: 2020-11-27

## 2020-11-27 DIAGNOSIS — M1A.4710 CHRONIC GOUT DUE TO OTHER SECONDARY CAUSE INVOLVING TOE OF RIGHT FOOT WITHOUT TOPHUS: ICD-10-CM

## 2020-11-27 DIAGNOSIS — I11.9 HYPERTENSIVE HEART DISEASE, UNSPECIFIED WHETHER HEART FAILURE PRESENT: ICD-10-CM

## 2020-11-27 DIAGNOSIS — Z13.6 SCREENING FOR ISCHEMIC HEART DISEASE (IHD): ICD-10-CM

## 2020-11-27 DIAGNOSIS — Z13.89 SCREENING FOR BLOOD OR PROTEIN IN URINE: ICD-10-CM

## 2020-11-27 DIAGNOSIS — Z79.899 LONG-TERM USE OF HIGH-RISK MEDICATION: ICD-10-CM

## 2020-11-27 DIAGNOSIS — E78.2 MIXED HYPERLIPIDEMIA: ICD-10-CM

## 2020-11-27 DIAGNOSIS — M85.80 OSTEOPENIA, UNSPECIFIED LOCATION: Primary | ICD-10-CM

## 2020-11-28 LAB
ALBUMIN SERPL-MCNC: 4.1 G/DL (ref 3.6–5.1)
ALBUMIN/CREAT UR: 88 MCG/MG CREAT
ALBUMIN/GLOB SERPL: 1.4 (CALC) (ref 1–2.5)
ALP SERPL-CCNC: 58 U/L (ref 37–153)
ALT SERPL-CCNC: 14 U/L (ref 6–29)
APPEARANCE UR: CLEAR
AST SERPL-CCNC: 18 U/L (ref 10–35)
BASOPHILS # BLD AUTO: 53 CELLS/UL (ref 0–200)
BASOPHILS NFR BLD AUTO: 0.9 %
BILIRUB SERPL-MCNC: 0.8 MG/DL (ref 0.2–1.2)
BILIRUB UR QL STRIP: NEGATIVE
BUN SERPL-MCNC: 23 MG/DL (ref 7–25)
BUN/CREAT SERPL: 24 (CALC) (ref 6–22)
CALCIUM SERPL-MCNC: 9.8 MG/DL (ref 8.6–10.4)
CHLORIDE SERPL-SCNC: 101 MMOL/L (ref 98–110)
CHOLEST SERPL-MCNC: 129 MG/DL
CHOLEST/HDLC SERPL: 2.3 (CALC)
CO2 SERPL-SCNC: 33 MMOL/L (ref 20–32)
COLOR UR: YELLOW
CREAT SERPL-MCNC: 0.96 MG/DL (ref 0.6–0.93)
CREAT UR-MCNC: 42 MG/DL (ref 20–275)
EOSINOPHIL # BLD AUTO: 218 CELLS/UL (ref 15–500)
EOSINOPHIL NFR BLD AUTO: 3.7 %
ERYTHROCYTE [DISTWIDTH] IN BLOOD BY AUTOMATED COUNT: 12.6 % (ref 11–15)
GFRSERPLBLD MDRD-ARVRAT: 57 ML/MIN/1.73M2
GLOBULIN SER CALC-MCNC: 3 G/DL (CALC) (ref 1.9–3.7)
GLUCOSE SERPL-MCNC: 105 MG/DL (ref 65–99)
GLUCOSE UR QL STRIP: NEGATIVE
HCT VFR BLD AUTO: 40.6 % (ref 35–45)
HDLC SERPL-MCNC: 55 MG/DL
HGB BLD-MCNC: 12.8 G/DL (ref 11.7–15.5)
HGB UR QL STRIP: NEGATIVE
KETONES UR QL STRIP: NEGATIVE
LDLC SERPL CALC-MCNC: 58 MG/DL (CALC)
LEUKOCYTE ESTERASE UR QL STRIP: ABNORMAL
LYMPHOCYTES # BLD AUTO: 2319 CELLS/UL (ref 850–3900)
LYMPHOCYTES NFR BLD AUTO: 39.3 %
MCH RBC QN AUTO: 30.1 PG (ref 27–33)
MCHC RBC AUTO-ENTMCNC: 31.5 G/DL (ref 32–36)
MCV RBC AUTO: 95.5 FL (ref 80–100)
MICROALBUMIN UR-MCNC: 3.7 MG/DL
MONOCYTES # BLD AUTO: 643 CELLS/UL (ref 200–950)
MONOCYTES NFR BLD AUTO: 10.9 %
NEUTROPHILS # BLD AUTO: 2667 CELLS/UL (ref 1500–7800)
NEUTROPHILS NFR BLD AUTO: 45.2 %
NITRITE UR QL STRIP: NEGATIVE
NONHDLC SERPL-MCNC: 74 MG/DL (CALC)
PH UR STRIP: 7 [PH] (ref 5–8)
PLATELET # BLD AUTO: 193 THOUSAND/UL (ref 140–400)
PMV BLD REES-ECKER: 12.4 FL (ref 7.5–12.5)
POTASSIUM SERPL-SCNC: 3.9 MMOL/L (ref 3.5–5.3)
PROT SERPL-MCNC: 7.1 G/DL (ref 6.1–8.1)
PROT UR QL STRIP: NEGATIVE
RBC # BLD AUTO: 4.25 MILLION/UL (ref 3.8–5.1)
SODIUM SERPL-SCNC: 142 MMOL/L (ref 135–146)
SP GR UR STRIP: 1.01 (ref 1–1.03)
TRIGL SERPL-MCNC: 81 MG/DL
URATE SERPL-MCNC: 6.5 MG/DL (ref 2.5–7)
WBC # BLD AUTO: 5.9 THOUSAND/UL (ref 3.8–10.8)

## 2020-12-02 ENCOUNTER — OFFICE VISIT (OUTPATIENT)
Dept: FAMILY MEDICINE | Facility: CLINIC | Age: 75
End: 2020-12-02
Payer: MEDICARE

## 2020-12-02 VITALS
HEART RATE: 80 BPM | DIASTOLIC BLOOD PRESSURE: 86 MMHG | BODY MASS INDEX: 38.76 KG/M2 | HEIGHT: 64 IN | WEIGHT: 227 LBS | SYSTOLIC BLOOD PRESSURE: 142 MMHG

## 2020-12-02 DIAGNOSIS — M85.852 OSTEOPENIA OF LEFT HIP: ICD-10-CM

## 2020-12-02 DIAGNOSIS — N89.8 VAGINAL DISCHARGE: ICD-10-CM

## 2020-12-02 DIAGNOSIS — I11.9 HYPERTENSIVE HEART DISEASE, UNSPECIFIED WHETHER HEART FAILURE PRESENT: Primary | ICD-10-CM

## 2020-12-02 DIAGNOSIS — R73.01 IFG (IMPAIRED FASTING GLUCOSE): ICD-10-CM

## 2020-12-02 DIAGNOSIS — E78.2 MIXED HYPERLIPIDEMIA: ICD-10-CM

## 2020-12-02 DIAGNOSIS — M1A.0710 CHRONIC IDIOPATHIC GOUT INVOLVING TOE OF RIGHT FOOT WITHOUT TOPHUS: ICD-10-CM

## 2020-12-02 PROCEDURE — 3079F DIAST BP 80-89 MM HG: CPT | Mod: S$GLB,,, | Performed by: FAMILY MEDICINE

## 2020-12-02 PROCEDURE — 1101F PT FALLS ASSESS-DOCD LE1/YR: CPT | Mod: S$GLB,,, | Performed by: FAMILY MEDICINE

## 2020-12-02 PROCEDURE — 3288F PR FALLS RISK ASSESSMENT DOCUMENTED: ICD-10-PCS | Mod: S$GLB,,, | Performed by: FAMILY MEDICINE

## 2020-12-02 PROCEDURE — 99214 PR OFFICE/OUTPT VISIT, EST, LEVL IV, 30-39 MIN: ICD-10-PCS | Mod: S$GLB,,, | Performed by: FAMILY MEDICINE

## 2020-12-02 PROCEDURE — 1159F PR MEDICATION LIST DOCUMENTED IN MEDICAL RECORD: ICD-10-PCS | Mod: S$GLB,,, | Performed by: FAMILY MEDICINE

## 2020-12-02 PROCEDURE — 3079F PR MOST RECENT DIASTOLIC BLOOD PRESSURE 80-89 MM HG: ICD-10-PCS | Mod: S$GLB,,, | Performed by: FAMILY MEDICINE

## 2020-12-02 PROCEDURE — 1159F MED LIST DOCD IN RCRD: CPT | Mod: S$GLB,,, | Performed by: FAMILY MEDICINE

## 2020-12-02 PROCEDURE — 3077F PR MOST RECENT SYSTOLIC BLOOD PRESSURE >= 140 MM HG: ICD-10-PCS | Mod: S$GLB,,, | Performed by: FAMILY MEDICINE

## 2020-12-02 PROCEDURE — 3077F SYST BP >= 140 MM HG: CPT | Mod: S$GLB,,, | Performed by: FAMILY MEDICINE

## 2020-12-02 PROCEDURE — 3288F FALL RISK ASSESSMENT DOCD: CPT | Mod: S$GLB,,, | Performed by: FAMILY MEDICINE

## 2020-12-02 PROCEDURE — 1101F PR PT FALLS ASSESS DOC 0-1 FALLS W/OUT INJ PAST YR: ICD-10-PCS | Mod: S$GLB,,, | Performed by: FAMILY MEDICINE

## 2020-12-02 PROCEDURE — 99214 OFFICE O/P EST MOD 30 MIN: CPT | Mod: S$GLB,,, | Performed by: FAMILY MEDICINE

## 2020-12-02 RX ORDER — FLUCONAZOLE 150 MG/1
150 TABLET ORAL
Qty: 2 TABLET | Refills: 0 | Status: SHIPPED | OUTPATIENT
Start: 2020-12-02 | End: 2020-12-08

## 2020-12-02 RX ORDER — ROSUVASTATIN CALCIUM 20 MG/1
20 TABLET, COATED ORAL NIGHTLY
Qty: 90 TABLET | Refills: 1 | Status: SHIPPED | OUTPATIENT
Start: 2020-12-02 | End: 2021-06-02 | Stop reason: SDUPTHER

## 2020-12-02 RX ORDER — ALENDRONATE SODIUM 70 MG/1
70 TABLET ORAL
Qty: 12 TABLET | Refills: 1 | Status: SHIPPED | OUTPATIENT
Start: 2020-12-02 | End: 2021-06-02 | Stop reason: SDUPTHER

## 2020-12-02 RX ORDER — CARVEDILOL 12.5 MG/1
12.5 TABLET ORAL 2 TIMES DAILY
Qty: 180 TABLET | Refills: 1 | Status: SHIPPED | OUTPATIENT
Start: 2020-12-02 | End: 2021-04-15 | Stop reason: SDUPTHER

## 2020-12-02 RX ORDER — AMLODIPINE BESYLATE 5 MG/1
5 TABLET ORAL DAILY
Qty: 90 TABLET | Refills: 1 | Status: SHIPPED | OUTPATIENT
Start: 2020-12-02 | End: 2021-04-15 | Stop reason: SDUPTHER

## 2020-12-02 NOTE — PROGRESS NOTES
SUBJECTIVE:    Patient ID: Tierra Taylor is a 75 y.o. female.    Chief Complaint: Follow-up (brought in bottles// )    Pt here to checkup on acute an chronic conditions    BP is slightly elevated. Denies CP/SOB/HA.   Needs another heart doctor as Dr. Arita retired. Was seeing him for an enlarged heart.  Has been gaining weight, over 7 pounds in the last 6 months.     Continues to have left knee pain.  Made worse by recent cold front. Dr. Romero retired. Has seen Dr. Marquez that has recommended a knee replacement. Elective surgeries now on hold again due to COVID.    No gout flares since last visit.  Uric acid is normal this time, 6.5.    Had labs done. fBS 105. LDL is 58. Some microalbuminuria, 88.    Noncompliant with CPAP due claustrophobia.  Complaining of daytime sleepiness.  Daughters tell her that she snores.    Has had a vaginal discharge for about 1 month, that is not bothersome. It is white and thick. Has not been on antibiotics recently    --------------------------------------------------------------------------------------  Mammogram due this month  DEXA UTD 5/2020  Had flu shot 10/2020      Telephone on 11/27/2020   Component Date Value Ref Range Status    Glucose 11/27/2020 105* 65 - 99 mg/dL Final    BUN 11/27/2020 23  7 - 25 mg/dL Final    Creatinine 11/27/2020 0.96* 0.60 - 0.93 mg/dL Final    eGFR if non African American 11/27/2020 57* > OR = 60 mL/min/1.73m2 Final    eGFR if African American 11/27/2020 66  > OR = 60 mL/min/1.73m2 Final    BUN/Creatinine Ratio 11/27/2020 24* 6 - 22 (calc) Final    Sodium 11/27/2020 142  135 - 146 mmol/L Final    Potassium 11/27/2020 3.9  3.5 - 5.3 mmol/L Final    Chloride 11/27/2020 101  98 - 110 mmol/L Final    CO2 11/27/2020 33* 20 - 32 mmol/L Final    Calcium 11/27/2020 9.8  8.6 - 10.4 mg/dL Final    Total Protein 11/27/2020 7.1  6.1 - 8.1 g/dL Final    Albumin 11/27/2020 4.1  3.6 - 5.1 g/dL Final    Globulin, Total 11/27/2020 3.0  1.9 - 3.7  g/dL (calc) Final    Albumin/Globulin Ratio 11/27/2020 1.4  1.0 - 2.5 (calc) Final    Total Bilirubin 11/27/2020 0.8  0.2 - 1.2 mg/dL Final    Alkaline Phosphatase 11/27/2020 58  37 - 153 U/L Final    AST 11/27/2020 18  10 - 35 U/L Final    ALT 11/27/2020 14  6 - 29 U/L Final    Cholesterol 11/27/2020 129  <200 mg/dL Final    HDL 11/27/2020 55  > OR = 50 mg/dL Final    Triglycerides 11/27/2020 81  <150 mg/dL Final    LDL Cholesterol 11/27/2020 58  mg/dL (calc) Final    HDL/Cholesterol Ratio 11/27/2020 2.3  <5.0 (calc) Final    Non HDL Chol. (LDL+VLDL) 11/27/2020 74  <130 mg/dL (calc) Final    Creatinine, Urine 11/27/2020 42  20 - 275 mg/dL Final    Microalb, Ur 11/27/2020 3.7  See Note: mg/dL Final    Microalb/Creat Ratio 11/27/2020 88* <30 mcg/mg creat Final    WBC 11/27/2020 5.9  3.8 - 10.8 Thousand/uL Final    RBC 11/27/2020 4.25  3.80 - 5.10 Million/uL Final    Hemoglobin 11/27/2020 12.8  11.7 - 15.5 g/dL Final    Hematocrit 11/27/2020 40.6  35.0 - 45.0 % Final    MCV 11/27/2020 95.5  80.0 - 100.0 fL Final    MCH 11/27/2020 30.1  27.0 - 33.0 pg Final    MCHC 11/27/2020 31.5* 32.0 - 36.0 g/dL Final    RDW 11/27/2020 12.6  11.0 - 15.0 % Final    Platelets 11/27/2020 193  140 - 400 Thousand/uL Final    MPV 11/27/2020 12.4  7.5 - 12.5 fL Final    Neutrophils Absolute 11/27/2020 2,667  1,500 - 7,800 cells/uL Final    Lymph # 11/27/2020 2,319  850 - 3,900 cells/uL Final    Mono # 11/27/2020 643  200 - 950 cells/uL Final    Eos # 11/27/2020 218  15 - 500 cells/uL Final    Baso # 11/27/2020 53  0 - 200 cells/uL Final    Neutrophils Relative 11/27/2020 45.2  % Final    Lymph % 11/27/2020 39.3  % Final    Mono % 11/27/2020 10.9  % Final    Eosinophil % 11/27/2020 3.7  % Final    Basophil % 11/27/2020 0.9  % Final    Uric Acid 11/27/2020 6.5  2.5 - 7.0 mg/dL Final    Color, UA 11/27/2020 YELLOW  YELLOW Final    Appearance, UA 11/27/2020 CLEAR  CLEAR Final    Specific [place], UA  11/27/2020 1.013  1.001 - 1.035 Final    pH, UA 11/27/2020 7.0  5.0 - 8.0 Final    Glucose, UA 11/27/2020 NEGATIVE  NEGATIVE Final    Bilirubin, UA 11/27/2020 NEGATIVE  NEGATIVE Final    Ketones, UA 11/27/2020 NEGATIVE  NEGATIVE Final    Occult Blood UA 11/27/2020 NEGATIVE  NEGATIVE Final    Protein, UA 11/27/2020 NEGATIVE  NEGATIVE Final    Nitrite, UA 11/27/2020 NEGATIVE  NEGATIVE Final    Leukocytes, UA 11/27/2020 1+* NEGATIVE Final       Past Medical History:   Diagnosis Date    Arthritis     Hypertension     Sleep apnea      Social History     Socioeconomic History    Marital status: Single     Spouse name: Not on file    Number of children: Not on file    Years of education: Not on file    Highest education level: Not on file   Occupational History    Not on file   Social Needs    Financial resource strain: Not on file    Food insecurity     Worry: Not on file     Inability: Not on file    Transportation needs     Medical: Not on file     Non-medical: Not on file   Tobacco Use    Smoking status: Never Smoker    Smokeless tobacco: Never Used   Substance and Sexual Activity    Alcohol use: No    Drug use: No    Sexual activity: Not on file   Lifestyle    Physical activity     Days per week: Not on file     Minutes per session: Not on file    Stress: Not on file   Relationships    Social connections     Talks on phone: Not on file     Gets together: Not on file     Attends Christian service: Not on file     Active member of club or organization: Not on file     Attends meetings of clubs or organizations: Not on file     Relationship status: Not on file   Other Topics Concern    Not on file   Social History Narrative    Not on file     Past Surgical History:   Procedure Laterality Date    BREAST BIOPSY      CERVICAL DISCECTOMY      FATTY TUMOR      HYSTERECTOMY      KNEE SURGERY       Family History   Problem Relation Age of Onset    Hypertension Mother     Hypertension Father         Review of patient's allergies indicates:   Allergen Reactions    Lisinopril Other (See Comments)     Cough    Penicillins     Tramadol      causes itching       Current Outpatient Medications:     allopurinoL (ZYLOPRIM) 100 MG tablet, Take 1 tablet (100 mg total) by mouth once daily., Disp: 90 tablet, Rfl: 1    aspirin 81 MG Chew, Take 81 mg by mouth every evening. , Disp: , Rfl:     bimatoprost (LUMIGAN) 0.03 % ophthalmic drops, 1 drop every evening., Disp: , Rfl:     calcium-vitamin D (CALCIUM 600 + D,3,) 600 mg(1,500mg) -400 unit Tab, Take 1 tablet by mouth 2 (two) times a day., Disp: , Rfl:     hydroCHLOROthiazide (HYDRODIURIL) 25 MG tablet, Take 1 tablet (25 mg total) by mouth every evening., Disp: 90 tablet, Rfl: 1    alendronate (FOSAMAX) 70 MG tablet, Take 1 tablet (70 mg total) by mouth every 7 days. For osteopenia, Disp: 12 tablet, Rfl: 1    amLODIPine (NORVASC) 5 MG tablet, Take 1 tablet (5 mg total) by mouth once daily., Disp: 90 tablet, Rfl: 1    carvediloL (COREG) 12.5 MG tablet, Take 1 tablet (12.5 mg total) by mouth 2 (two) times daily., Disp: 180 tablet, Rfl: 1    ibuprofen (ADVIL,MOTRIN) 200 MG tablet, Take 200 mg by mouth every 6 (six) hours as needed for Pain., Disp: , Rfl:     mometasone (NASONEX) 50 mcg/actuation nasal spray, 2 sprays in each nostril, Disp: , Rfl:     naproxen sodium (ANAPROX) 220 MG tablet, Take 220 mg by mouth 2 (two) times daily with meals., Disp: , Rfl:     rosuvastatin (CRESTOR) 20 MG tablet, Take 1 tablet (20 mg total) by mouth every evening., Disp: 90 tablet, Rfl: 1  No current facility-administered medications for this visit.     Facility-Administered Medications Ordered in Other Visits:     lactated ringers infusion, , Intravenous, Continuous, Marck Davies MD, Last Rate: 10 mL/hr at 08/20/19 0730    lidocaine (PF) 10 mg/ml (1%) injection 10 mg, 1 mL, Intradermal, Once, Marck Davies MD    Review of Systems   Constitutional: Negative for  "appetite change, fatigue, fever and unexpected weight change.   Respiratory: Negative for cough, chest tightness, shortness of breath and wheezing.    Cardiovascular: Negative for chest pain and leg swelling.   Gastrointestinal: Negative for abdominal pain, constipation, nausea and vomiting.        -heartburn   Genitourinary: Negative for difficulty urinating, dysuria, frequency and menstrual problem.   Musculoskeletal: Positive for arthralgias (knees). Negative for back pain, myalgias and neck pain.   Skin: Negative for rash.   Neurological: Negative for dizziness, weakness, numbness and headaches.   Hematological: Does not bruise/bleed easily.   Psychiatric/Behavioral: Negative for dysphoric mood, sleep disturbance and suicidal ideas. The patient is not nervous/anxious.           Objective:      Vitals:    12/02/20 1422   BP: (!) 142/86   Pulse: 80   Weight: 103 kg (227 lb)   Height: 5' 4" (1.626 m)     Wt Readings from Last 3 Encounters:   12/02/20 103 kg (227 lb)   10/08/20 101.6 kg (224 lb)   06/23/20 99.9 kg (220 lb 3.8 oz)       Physical Exam  Vitals signs reviewed.   Constitutional:       Appearance: She is well-developed.      Comments: obese   HENT:      Head: Normocephalic and atraumatic.   Neck:      Musculoskeletal: Neck supple.      Thyroid: No thyromegaly.   Cardiovascular:      Rate and Rhythm: Normal rate and regular rhythm.      Heart sounds: Normal heart sounds. No murmur. No friction rub.   Pulmonary:      Effort: Pulmonary effort is normal.      Breath sounds: Normal breath sounds. No wheezing or rales.   Abdominal:      General: Bowel sounds are normal. There is no distension.      Palpations: Abdomen is soft.      Tenderness: There is no abdominal tenderness.   Lymphadenopathy:      Cervical: No cervical adenopathy.   Skin:     General: Skin is warm and dry.      Findings: No rash.   Neurological:      Mental Status: She is alert and oriented to person, place, and time.      Gait: Gait " abnormal (antalgic, slight limp on the left).   Psychiatric:         Speech: Speech normal.         Behavior: Behavior normal.         Thought Content: Thought content normal.         Judgment: Judgment normal.           Assessment:       1. Hypertensive heart disease, unspecified whether heart failure present    2. Mixed hyperlipidemia    3. Chronic idiopathic gout involving toe of right foot without tophus    4. Osteopenia of left hip    5. Vaginal discharge    6. IFG (impaired fasting glucose)         Plan:       Hypertensive heart disease, unspecified whether heart failure present  Comments:  Uncontrolled. Discussed weight management. Will continue to montior BP   Orders:  -     carvediloL (COREG) 12.5 MG tablet; Take 1 tablet (12.5 mg total) by mouth 2 (two) times daily.  Dispense: 180 tablet; Refill: 1  -     amLODIPine (NORVASC) 5 MG tablet; Take 1 tablet (5 mg total) by mouth once daily.  Dispense: 90 tablet; Refill: 1    Mixed hyperlipidemia  Comments:  Controlled. LDL 58. To continue crestor for now  Orders:  -     rosuvastatin (CRESTOR) 20 MG tablet; Take 1 tablet (20 mg total) by mouth every evening.  Dispense: 90 tablet; Refill: 1    Chronic idiopathic gout involving toe of right foot without tophus  Comments:  Controlled. Uric acid 6.5. To continue allopurinol. Will continue to monitor for symptoms    Osteopenia of left hip  Comments:  Active. To continue fosamax with repeat DEXA q 2 years.  Orders:  -     alendronate (FOSAMAX) 70 MG tablet; Take 1 tablet (70 mg total) by mouth every 7 days. For osteopenia  Dispense: 12 tablet; Refill: 1    Vaginal discharge  Comments:  Recent. Will tx for candida based on symptoms. Pt to report if no resolution  Orders:  -     fluconazole (DIFLUCAN) 150 MG Tab; Take 1 tablet (150 mg total) by mouth every 72 hours. for 6 days  Dispense: 2 tablet; Refill: 0    IFG (impaired fasting glucose)  Comments:  Chronic. fBS 105. Discussed dietary discretion and lifestyle  changes. Exercise limited by arthritis. Will continue to montior  Orders:  -     Comprehensive Metabolic Panel; Future; Expected date: 12/02/2020  -     Hemoglobin A1C; Future; Expected date: 12/02/2020    Other  Lab results discussed and reviewed with patient.  Labs have been ordered for monitoring of chronic conditions, just before next visit.    Follow up in about 6 months (around 6/2/2021) for HTN, IFG, Arthritis, LABS.        12/20/2020 Sofie Leonardo

## 2020-12-03 ENCOUNTER — TELEPHONE (OUTPATIENT)
Dept: FAMILY MEDICINE | Facility: CLINIC | Age: 75
End: 2020-12-03

## 2020-12-03 ENCOUNTER — TELEPHONE (OUTPATIENT)
Dept: CARDIOLOGY | Facility: CLINIC | Age: 75
End: 2020-12-03

## 2020-12-03 NOTE — TELEPHONE ENCOUNTER
----- Message from Cely Harper sent at 6/24/2020  9:38 AM CDT -----  Regarding: Breast US  Sofie Leonardo MD  P Sofie Leonardo Staff         Please call the patient regarding her abnormal mammogram w/ right breast u/s result.     1. Scattered fibroglandular densities.  There are nodular asymmetries in the right breast retroareolar region laterally on the nipple in profile view, unchanged from prior mammograms   2. Probably benign exam.  Focally dilated ducts containing echogenic debris in the right breast retroareolar region have stable mammographic appearance over multiple prior exams.  A follow-up right breast diagnostic ultrasound in 6 months is recommended.     ##Recommend RIGHT breast U/S in 6 months.

## 2020-12-03 NOTE — TELEPHONE ENCOUNTER
----- Message from Delfina Waters sent at 12/3/2020 11:25 AM CST -----  Regarding: appt  Please call patient she is needing a sooner appt [phone]

## 2020-12-28 ENCOUNTER — HOSPITAL ENCOUNTER (OUTPATIENT)
Dept: RADIOLOGY | Facility: HOSPITAL | Age: 75
Discharge: HOME | End: 2020-12-28
Attending: FAMILY MEDICINE
Payer: MEDICARE

## 2020-12-28 DIAGNOSIS — R92.8 ABNORMAL MAMMOGRAM: ICD-10-CM

## 2020-12-28 PROCEDURE — 76642 ULTRASOUND BREAST LIMITED: CPT | Mod: TC,PO,RT

## 2021-01-06 RX ORDER — DOXYCYCLINE HYCLATE 100 MG
100 TABLET ORAL 2 TIMES DAILY
Qty: 14 TABLET | Refills: 0 | Status: SHIPPED | OUTPATIENT
Start: 2021-01-06 | End: 2021-01-13

## 2021-01-08 ENCOUNTER — TELEPHONE (OUTPATIENT)
Dept: RHEUMATOLOGY | Facility: CLINIC | Age: 76
End: 2021-01-08

## 2021-01-21 ENCOUNTER — TELEPHONE (OUTPATIENT)
Dept: FAMILY MEDICINE | Facility: CLINIC | Age: 76
End: 2021-01-21

## 2021-03-15 ENCOUNTER — OFFICE VISIT (OUTPATIENT)
Dept: CARDIOLOGY | Facility: CLINIC | Age: 76
End: 2021-03-15
Payer: MEDICARE

## 2021-03-15 VITALS
SYSTOLIC BLOOD PRESSURE: 132 MMHG | HEART RATE: 67 BPM | DIASTOLIC BLOOD PRESSURE: 80 MMHG | BODY MASS INDEX: 39.27 KG/M2 | WEIGHT: 230 LBS | RESPIRATION RATE: 16 BRPM | HEIGHT: 64 IN | OXYGEN SATURATION: 98 %

## 2021-03-15 DIAGNOSIS — R06.09 DYSPNEA ON EXERTION: ICD-10-CM

## 2021-03-15 DIAGNOSIS — I11.9 HYPERTENSIVE HEART DISEASE WITHOUT HEART FAILURE: ICD-10-CM

## 2021-03-15 DIAGNOSIS — E66.01 SEVERE OBESITY: ICD-10-CM

## 2021-03-15 DIAGNOSIS — E78.2 MIXED HYPERLIPIDEMIA: ICD-10-CM

## 2021-03-15 DIAGNOSIS — I50.31 ACUTE DIASTOLIC HEART FAILURE: Primary | ICD-10-CM

## 2021-03-15 DIAGNOSIS — I27.29 OTHER SECONDARY PULMONARY HYPERTENSION: ICD-10-CM

## 2021-03-15 DIAGNOSIS — I10 ESSENTIAL HYPERTENSION: ICD-10-CM

## 2021-03-15 DIAGNOSIS — R94.31 NONSPECIFIC ABNORMAL ELECTROCARDIOGRAM (ECG) (EKG): ICD-10-CM

## 2021-03-15 DIAGNOSIS — G47.33 OBSTRUCTIVE SLEEP APNEA: ICD-10-CM

## 2021-03-15 PROCEDURE — 3075F SYST BP GE 130 - 139MM HG: CPT | Mod: CPTII,S$GLB,, | Performed by: INTERNAL MEDICINE

## 2021-03-15 PROCEDURE — 3288F FALL RISK ASSESSMENT DOCD: CPT | Mod: CPTII,S$GLB,, | Performed by: INTERNAL MEDICINE

## 2021-03-15 PROCEDURE — 93000 ELECTROCARDIOGRAM COMPLETE: CPT | Mod: S$GLB,,, | Performed by: INTERNAL MEDICINE

## 2021-03-15 PROCEDURE — 3079F PR MOST RECENT DIASTOLIC BLOOD PRESSURE 80-89 MM HG: ICD-10-PCS | Mod: CPTII,S$GLB,, | Performed by: INTERNAL MEDICINE

## 2021-03-15 PROCEDURE — 3079F DIAST BP 80-89 MM HG: CPT | Mod: CPTII,S$GLB,, | Performed by: INTERNAL MEDICINE

## 2021-03-15 PROCEDURE — 1125F PR PAIN SEVERITY QUANTIFIED, PAIN PRESENT: ICD-10-PCS | Mod: S$GLB,,, | Performed by: INTERNAL MEDICINE

## 2021-03-15 PROCEDURE — 3075F PR MOST RECENT SYSTOLIC BLOOD PRESS GE 130-139MM HG: ICD-10-PCS | Mod: CPTII,S$GLB,, | Performed by: INTERNAL MEDICINE

## 2021-03-15 PROCEDURE — 99205 OFFICE O/P NEW HI 60 MIN: CPT | Mod: S$GLB,,, | Performed by: INTERNAL MEDICINE

## 2021-03-15 PROCEDURE — 93000 EKG 12-LEAD: ICD-10-PCS | Mod: S$GLB,,, | Performed by: INTERNAL MEDICINE

## 2021-03-15 PROCEDURE — 1125F AMNT PAIN NOTED PAIN PRSNT: CPT | Mod: S$GLB,,, | Performed by: INTERNAL MEDICINE

## 2021-03-15 PROCEDURE — 1101F PT FALLS ASSESS-DOCD LE1/YR: CPT | Mod: CPTII,S$GLB,, | Performed by: INTERNAL MEDICINE

## 2021-03-15 PROCEDURE — 99205 PR OFFICE/OUTPT VISIT, NEW, LEVL V, 60-74 MIN: ICD-10-PCS | Mod: S$GLB,,, | Performed by: INTERNAL MEDICINE

## 2021-03-15 PROCEDURE — 1159F PR MEDICATION LIST DOCUMENTED IN MEDICAL RECORD: ICD-10-PCS | Mod: S$GLB,,, | Performed by: INTERNAL MEDICINE

## 2021-03-15 PROCEDURE — 1101F PR PT FALLS ASSESS DOC 0-1 FALLS W/OUT INJ PAST YR: ICD-10-PCS | Mod: CPTII,S$GLB,, | Performed by: INTERNAL MEDICINE

## 2021-03-15 PROCEDURE — 1159F MED LIST DOCD IN RCRD: CPT | Mod: S$GLB,,, | Performed by: INTERNAL MEDICINE

## 2021-03-15 PROCEDURE — 3288F PR FALLS RISK ASSESSMENT DOCUMENTED: ICD-10-PCS | Mod: CPTII,S$GLB,, | Performed by: INTERNAL MEDICINE

## 2021-03-16 ENCOUNTER — OFFICE VISIT (OUTPATIENT)
Dept: ORTHOPEDICS | Facility: CLINIC | Age: 76
End: 2021-03-16
Payer: MEDICARE

## 2021-03-16 VITALS
SYSTOLIC BLOOD PRESSURE: 160 MMHG | DIASTOLIC BLOOD PRESSURE: 96 MMHG | HEIGHT: 64 IN | HEART RATE: 66 BPM | BODY MASS INDEX: 39.27 KG/M2 | WEIGHT: 230 LBS

## 2021-03-16 DIAGNOSIS — M17.12 PRIMARY OSTEOARTHRITIS OF LEFT KNEE: Primary | ICD-10-CM

## 2021-03-16 DIAGNOSIS — M17.11 PRIMARY OSTEOARTHRITIS OF RIGHT KNEE: ICD-10-CM

## 2021-03-16 PROCEDURE — 3288F PR FALLS RISK ASSESSMENT DOCUMENTED: ICD-10-PCS | Mod: S$GLB,,, | Performed by: ORTHOPAEDIC SURGERY

## 2021-03-16 PROCEDURE — 20610 DRAIN/INJ JOINT/BURSA W/O US: CPT | Mod: 50,S$GLB,, | Performed by: ORTHOPAEDIC SURGERY

## 2021-03-16 PROCEDURE — 1125F AMNT PAIN NOTED PAIN PRSNT: CPT | Mod: S$GLB,,, | Performed by: ORTHOPAEDIC SURGERY

## 2021-03-16 PROCEDURE — 99214 PR OFFICE/OUTPT VISIT, EST, LEVL IV, 30-39 MIN: ICD-10-PCS | Mod: 25,S$GLB,, | Performed by: ORTHOPAEDIC SURGERY

## 2021-03-16 PROCEDURE — 1101F PT FALLS ASSESS-DOCD LE1/YR: CPT | Mod: S$GLB,,, | Performed by: ORTHOPAEDIC SURGERY

## 2021-03-16 PROCEDURE — 1101F PR PT FALLS ASSESS DOC 0-1 FALLS W/OUT INJ PAST YR: ICD-10-PCS | Mod: S$GLB,,, | Performed by: ORTHOPAEDIC SURGERY

## 2021-03-16 PROCEDURE — 99214 OFFICE O/P EST MOD 30 MIN: CPT | Mod: 25,S$GLB,, | Performed by: ORTHOPAEDIC SURGERY

## 2021-03-16 PROCEDURE — 1125F PR PAIN SEVERITY QUANTIFIED, PAIN PRESENT: ICD-10-PCS | Mod: S$GLB,,, | Performed by: ORTHOPAEDIC SURGERY

## 2021-03-16 PROCEDURE — 3077F SYST BP >= 140 MM HG: CPT | Mod: S$GLB,,, | Performed by: ORTHOPAEDIC SURGERY

## 2021-03-16 PROCEDURE — 1159F PR MEDICATION LIST DOCUMENTED IN MEDICAL RECORD: ICD-10-PCS | Mod: S$GLB,,, | Performed by: ORTHOPAEDIC SURGERY

## 2021-03-16 PROCEDURE — 3080F PR MOST RECENT DIASTOLIC BLOOD PRESSURE >= 90 MM HG: ICD-10-PCS | Mod: S$GLB,,, | Performed by: ORTHOPAEDIC SURGERY

## 2021-03-16 PROCEDURE — 3077F PR MOST RECENT SYSTOLIC BLOOD PRESSURE >= 140 MM HG: ICD-10-PCS | Mod: S$GLB,,, | Performed by: ORTHOPAEDIC SURGERY

## 2021-03-16 PROCEDURE — 1159F MED LIST DOCD IN RCRD: CPT | Mod: S$GLB,,, | Performed by: ORTHOPAEDIC SURGERY

## 2021-03-16 PROCEDURE — 3080F DIAST BP >= 90 MM HG: CPT | Mod: S$GLB,,, | Performed by: ORTHOPAEDIC SURGERY

## 2021-03-16 PROCEDURE — 20610 LARGE JOINT ASPIRATION/INJECTION: R KNEE: ICD-10-PCS | Mod: 50,S$GLB,, | Performed by: ORTHOPAEDIC SURGERY

## 2021-03-16 PROCEDURE — 3288F FALL RISK ASSESSMENT DOCD: CPT | Mod: S$GLB,,, | Performed by: ORTHOPAEDIC SURGERY

## 2021-03-16 RX ORDER — TRAMADOL HYDROCHLORIDE 50 MG/1
50 TABLET ORAL EVERY 6 HOURS PRN
Qty: 28 TABLET | Refills: 0 | Status: SHIPPED | OUTPATIENT
Start: 2021-03-16 | End: 2021-03-23

## 2021-03-16 RX ORDER — METHYLPREDNISOLONE ACETATE 40 MG/ML
40 INJECTION, SUSPENSION INTRA-ARTICULAR; INTRALESIONAL; INTRAMUSCULAR; SOFT TISSUE
Status: DISCONTINUED | OUTPATIENT
Start: 2021-03-16 | End: 2021-03-16 | Stop reason: HOSPADM

## 2021-03-16 RX ADMIN — METHYLPREDNISOLONE ACETATE 40 MG: 40 INJECTION, SUSPENSION INTRA-ARTICULAR; INTRALESIONAL; INTRAMUSCULAR; SOFT TISSUE at 10:03

## 2021-03-17 DIAGNOSIS — M17.12 PRIMARY OSTEOARTHRITIS OF LEFT KNEE: Primary | ICD-10-CM

## 2021-03-17 DIAGNOSIS — Z96.652 STATUS POST TOTAL KNEE REPLACEMENT, LEFT: ICD-10-CM

## 2021-03-17 DIAGNOSIS — M17.11 PRIMARY OSTEOARTHRITIS OF RIGHT KNEE: ICD-10-CM

## 2021-04-01 ENCOUNTER — HOSPITAL ENCOUNTER (OUTPATIENT)
Dept: CARDIOLOGY | Facility: CLINIC | Age: 76
Discharge: HOME | End: 2021-04-01
Attending: INTERNAL MEDICINE
Payer: MEDICARE

## 2021-04-01 ENCOUNTER — HOSPITAL ENCOUNTER (OUTPATIENT)
Dept: RADIOLOGY | Facility: CLINIC | Age: 76
Discharge: HOME | End: 2021-04-01
Attending: INTERNAL MEDICINE
Payer: MEDICARE

## 2021-04-01 VITALS — HEIGHT: 64 IN | BODY MASS INDEX: 39.27 KG/M2 | WEIGHT: 230 LBS

## 2021-04-01 DIAGNOSIS — R94.31 NONSPECIFIC ABNORMAL ELECTROCARDIOGRAM (ECG) (EKG): ICD-10-CM

## 2021-04-01 DIAGNOSIS — R06.09 DYSPNEA ON EXERTION: ICD-10-CM

## 2021-04-01 DIAGNOSIS — I27.29 OTHER SECONDARY PULMONARY HYPERTENSION: ICD-10-CM

## 2021-04-01 DIAGNOSIS — I10 ESSENTIAL HYPERTENSION: ICD-10-CM

## 2021-04-01 DIAGNOSIS — G47.33 OBSTRUCTIVE SLEEP APNEA: ICD-10-CM

## 2021-04-01 DIAGNOSIS — I11.9 HYPERTENSIVE HEART DISEASE WITHOUT HEART FAILURE: ICD-10-CM

## 2021-04-01 DIAGNOSIS — I50.31 ACUTE DIASTOLIC HEART FAILURE: ICD-10-CM

## 2021-04-01 PROCEDURE — 93306 TTE W/DOPPLER COMPLETE: CPT | Mod: S$GLB,,, | Performed by: INTERNAL MEDICINE

## 2021-04-01 PROCEDURE — 78452 STRESS TEST WITH MYOCARDIAL PERFUSION (CUPID ONLY): ICD-10-PCS | Mod: S$GLB,,, | Performed by: INTERNAL MEDICINE

## 2021-04-01 PROCEDURE — 93015 CV STRESS TEST SUPVJ I&R: CPT | Mod: S$GLB,,, | Performed by: INTERNAL MEDICINE

## 2021-04-01 PROCEDURE — 93306 ECHO (CUPID ONLY): ICD-10-PCS | Mod: S$GLB,,, | Performed by: INTERNAL MEDICINE

## 2021-04-01 PROCEDURE — A9502 STRESS TEST WITH MYOCARDIAL PERFUSION (CUPID ONLY): ICD-10-PCS | Mod: S$GLB,,, | Performed by: INTERNAL MEDICINE

## 2021-04-01 PROCEDURE — A9502 TC99M TETROFOSMIN: HCPCS | Mod: S$GLB,,, | Performed by: INTERNAL MEDICINE

## 2021-04-01 PROCEDURE — 93015 STRESS TEST WITH MYOCARDIAL PERFUSION (CUPID ONLY): ICD-10-PCS | Mod: S$GLB,,, | Performed by: INTERNAL MEDICINE

## 2021-04-01 PROCEDURE — 78452 HT MUSCLE IMAGE SPECT MULT: CPT | Mod: S$GLB,,, | Performed by: INTERNAL MEDICINE

## 2021-04-01 RX ORDER — REGADENOSON 0.08 MG/ML
0.4 INJECTION, SOLUTION INTRAVENOUS ONCE
Status: COMPLETED | OUTPATIENT
Start: 2021-04-01 | End: 2021-04-01

## 2021-04-01 RX ADMIN — REGADENOSON 0.4 MG: 0.08 INJECTION, SOLUTION INTRAVENOUS at 09:04

## 2021-04-07 ENCOUNTER — PATIENT MESSAGE (OUTPATIENT)
Dept: RHEUMATOLOGY | Facility: CLINIC | Age: 76
End: 2021-04-07

## 2021-04-12 LAB
AORTIC ROOT ANNULUS: 3.3 CM
AORTIC VALVE CUSP SEPERATION: 2 CM
AV INDEX (PROSTH): 0.8
AV MEAN GRADIENT: 3 MMHG
AV PEAK GRADIENT: 5 MMHG
AV VALVE AREA: 3.6 CM2
AV VELOCITY RATIO: 0.67
BSA FOR ECHO PROCEDURE: 2.17 M2
CV ECHO LV RWT: 0.6 CM
CV PHARM DOSE: 0.4 MG
CV STRESS BASE HR: 65 BPM
DIASTOLIC BLOOD PRESSURE: 88 MMHG
DOP CALC AO PEAK VEL: 1.17 M/S
DOP CALC AO VTI: 27.5 CM
DOP CALC LVOT AREA: 4.5 CM2
DOP CALC LVOT DIAMETER: 2.4 CM
DOP CALC LVOT PEAK VEL: 0.78 M/S
DOP CALC LVOT STROKE VOLUME: 99.02 CM3
DOP CALCLVOT PEAK VEL VTI: 21.9 CM
E WAVE DECELERATION TIME: 274 MS
E/A RATIO: 0.79
E/E' RATIO: 9 M/S
ECHO LV POSTERIOR WALL: 1.25 CM (ref 0.6–1.1)
EJECTION FRACTION- HIGH: 65 %
END DIASTOLIC INDEX-HIGH: 158 ML/M2
END DIASTOLIC INDEX-LOW: 94 ML/M2
END SYSTOLIC INDEX-HIGH: 71 ML/M2
END SYSTOLIC INDEX-LOW: 33 ML/M2
FRACTIONAL SHORTENING: 35 % (ref 28–44)
INTERVENTRICULAR SEPTUM: 1.57 CM (ref 0.6–1.1)
IVRT: 116 MS
LA MAJOR: 4.3 CM
LEFT ATRIUM SIZE: 4.3 CM
LEFT INTERNAL DIMENSION IN SYSTOLE: 2.68 CM (ref 2.1–4)
LEFT VENTRICLE MASS INDEX: 107 G/M2
LEFT VENTRICULAR INTERNAL DIMENSION IN DIASTOLE: 4.15 CM (ref 3.5–6)
LEFT VENTRICULAR MASS: 222.86 G
LV LATERAL E/E' RATIO: 9 M/S
LV SEPTAL E/E' RATIO: 9 M/S
MV PEAK A VEL: 0.68 M/S
MV PEAK E VEL: 0.54 M/S
NUC STRESS DIASTOLIC VOLUME INDEX: 83
NUC STRESS EJECTION FRACTION: 70 %
NUC STRESS SYSTOLIC VOLUME INDEX: 25
OHS CV CPX 1 MINUTE RECOVERY HEART RATE: 92 BPM
OHS CV CPX 85 PERCENT MAX PREDICTED HEART RATE MALE: 118
OHS CV CPX MAX PREDICTED HEART RATE: 138
OHS CV CPX PATIENT IS FEMALE: 1
OHS CV CPX PATIENT IS MALE: 0
OHS CV CPX PEAK DIASTOLIC BLOOD PRESSURE: 72 MMHG
OHS CV CPX PEAK HEAR RATE: 92 BPM
OHS CV CPX PEAK RATE PRESSURE PRODUCT: NORMAL
OHS CV CPX PEAK SYSTOLIC BLOOD PRESSURE: 168 MMHG
OHS CV CPX PERCENT MAX PREDICTED HEART RATE ACHIEVED: 67
OHS CV CPX RATE PRESSURE PRODUCT PRESENTING: 9620
RETIRED EF AND QEF - SEE NOTES: 53 %
RIGHT VENTRICULAR END-DIASTOLIC DIMENSION: 3.45 CM
SYSTOLIC BLOOD PRESSURE: 148 MMHG
TDI LATERAL: 0.06 M/S
TDI SEPTAL: 0.06 M/S
TDI: 0.06 M/S

## 2021-04-13 ENCOUNTER — OFFICE VISIT (OUTPATIENT)
Dept: CARDIOLOGY | Facility: CLINIC | Age: 76
End: 2021-04-13
Payer: MEDICARE

## 2021-04-13 VITALS
RESPIRATION RATE: 16 BRPM | HEART RATE: 74 BPM | BODY MASS INDEX: 38.76 KG/M2 | WEIGHT: 227 LBS | OXYGEN SATURATION: 95 % | DIASTOLIC BLOOD PRESSURE: 72 MMHG | HEIGHT: 64 IN | SYSTOLIC BLOOD PRESSURE: 132 MMHG

## 2021-04-13 DIAGNOSIS — M17.12 PRIMARY OSTEOARTHRITIS OF LEFT KNEE: ICD-10-CM

## 2021-04-13 DIAGNOSIS — G47.33 OBSTRUCTIVE SLEEP APNEA: ICD-10-CM

## 2021-04-13 DIAGNOSIS — I50.32 CHRONIC DIASTOLIC HEART FAILURE: Primary | ICD-10-CM

## 2021-04-13 DIAGNOSIS — M1A.0710 CHRONIC IDIOPATHIC GOUT INVOLVING TOE OF RIGHT FOOT WITHOUT TOPHUS: ICD-10-CM

## 2021-04-13 DIAGNOSIS — R94.39 ABNORMAL NUCLEAR STRESS TEST: ICD-10-CM

## 2021-04-13 DIAGNOSIS — I10 ESSENTIAL HYPERTENSION: ICD-10-CM

## 2021-04-13 DIAGNOSIS — I11.9 HYPERTENSIVE HEART DISEASE WITHOUT HEART FAILURE: ICD-10-CM

## 2021-04-13 DIAGNOSIS — E66.01 SEVERE OBESITY: ICD-10-CM

## 2021-04-13 DIAGNOSIS — E78.2 MIXED HYPERLIPIDEMIA: ICD-10-CM

## 2021-04-13 DIAGNOSIS — R06.09 DYSPNEA ON EXERTION: ICD-10-CM

## 2021-04-13 DIAGNOSIS — R94.31 NONSPECIFIC ABNORMAL ELECTROCARDIOGRAM (ECG) (EKG): ICD-10-CM

## 2021-04-13 PROCEDURE — 1159F MED LIST DOCD IN RCRD: CPT | Mod: S$GLB,,, | Performed by: INTERNAL MEDICINE

## 2021-04-13 PROCEDURE — 1126F PR PAIN SEVERITY QUANTIFIED, NO PAIN PRESENT: ICD-10-PCS | Mod: S$GLB,,, | Performed by: INTERNAL MEDICINE

## 2021-04-13 PROCEDURE — 1126F AMNT PAIN NOTED NONE PRSNT: CPT | Mod: S$GLB,,, | Performed by: INTERNAL MEDICINE

## 2021-04-13 PROCEDURE — 99214 PR OFFICE/OUTPT VISIT, EST, LEVL IV, 30-39 MIN: ICD-10-PCS | Mod: S$GLB,,, | Performed by: INTERNAL MEDICINE

## 2021-04-13 PROCEDURE — 1159F PR MEDICATION LIST DOCUMENTED IN MEDICAL RECORD: ICD-10-PCS | Mod: S$GLB,,, | Performed by: INTERNAL MEDICINE

## 2021-04-13 PROCEDURE — 3288F FALL RISK ASSESSMENT DOCD: CPT | Mod: CPTII,S$GLB,, | Performed by: INTERNAL MEDICINE

## 2021-04-13 PROCEDURE — 1101F PR PT FALLS ASSESS DOC 0-1 FALLS W/OUT INJ PAST YR: ICD-10-PCS | Mod: CPTII,S$GLB,, | Performed by: INTERNAL MEDICINE

## 2021-04-13 PROCEDURE — 99214 OFFICE O/P EST MOD 30 MIN: CPT | Mod: S$GLB,,, | Performed by: INTERNAL MEDICINE

## 2021-04-13 PROCEDURE — 1101F PT FALLS ASSESS-DOCD LE1/YR: CPT | Mod: CPTII,S$GLB,, | Performed by: INTERNAL MEDICINE

## 2021-04-13 PROCEDURE — 3288F PR FALLS RISK ASSESSMENT DOCUMENTED: ICD-10-PCS | Mod: CPTII,S$GLB,, | Performed by: INTERNAL MEDICINE

## 2021-04-15 DIAGNOSIS — I11.9 HYPERTENSIVE HEART DISEASE, UNSPECIFIED WHETHER HEART FAILURE PRESENT: ICD-10-CM

## 2021-04-15 RX ORDER — AMLODIPINE BESYLATE 5 MG/1
5 TABLET ORAL DAILY
Qty: 90 TABLET | Refills: 1 | Status: SHIPPED | OUTPATIENT
Start: 2021-04-15 | End: 2021-10-18 | Stop reason: SDUPTHER

## 2021-04-15 RX ORDER — ALLOPURINOL 100 MG/1
100 TABLET ORAL DAILY
Qty: 90 TABLET | Refills: 1 | Status: SHIPPED | OUTPATIENT
Start: 2021-04-15 | End: 2021-10-18 | Stop reason: SDUPTHER

## 2021-04-15 RX ORDER — CARVEDILOL 12.5 MG/1
12.5 TABLET ORAL 2 TIMES DAILY
Qty: 180 TABLET | Refills: 1 | Status: SHIPPED | OUTPATIENT
Start: 2021-04-15 | End: 2021-10-18 | Stop reason: SDUPTHER

## 2021-04-15 RX ORDER — HYDROCHLOROTHIAZIDE 25 MG/1
25 TABLET ORAL NIGHTLY
Qty: 90 TABLET | Refills: 1 | Status: SHIPPED | OUTPATIENT
Start: 2021-04-15 | End: 2021-10-18 | Stop reason: SDUPTHER

## 2021-04-28 DIAGNOSIS — R92.8 ABNORMAL MAMMOGRAM: Primary | ICD-10-CM

## 2021-04-29 ENCOUNTER — PATIENT MESSAGE (OUTPATIENT)
Dept: RESEARCH | Facility: HOSPITAL | Age: 76
End: 2021-04-29

## 2021-05-17 DIAGNOSIS — R92.8 ABNORMAL MAMMOGRAM: Primary | ICD-10-CM

## 2021-05-27 ENCOUNTER — TELEPHONE (OUTPATIENT)
Dept: FAMILY MEDICINE | Facility: CLINIC | Age: 76
End: 2021-05-27

## 2021-05-31 ENCOUNTER — OFFICE VISIT (OUTPATIENT)
Dept: RHEUMATOLOGY | Facility: CLINIC | Age: 76
End: 2021-05-31
Payer: MEDICARE

## 2021-05-31 DIAGNOSIS — I10 ESSENTIAL HYPERTENSION: ICD-10-CM

## 2021-05-31 DIAGNOSIS — M19.90 OSTEOARTHRITIS, UNSPECIFIED OSTEOARTHRITIS TYPE, UNSPECIFIED SITE: ICD-10-CM

## 2021-05-31 DIAGNOSIS — I50.32 CHRONIC DIASTOLIC HEART FAILURE: ICD-10-CM

## 2021-05-31 DIAGNOSIS — E78.2 MIXED HYPERLIPIDEMIA: ICD-10-CM

## 2021-05-31 DIAGNOSIS — M25.50 ARTHRALGIA, UNSPECIFIED JOINT: Primary | ICD-10-CM

## 2021-05-31 DIAGNOSIS — E66.01 SEVERE OBESITY: ICD-10-CM

## 2021-05-31 PROCEDURE — 1159F PR MEDICATION LIST DOCUMENTED IN MEDICAL RECORD: ICD-10-PCS | Mod: S$GLB,,, | Performed by: STUDENT IN AN ORGANIZED HEALTH CARE EDUCATION/TRAINING PROGRAM

## 2021-05-31 PROCEDURE — 99204 OFFICE O/P NEW MOD 45 MIN: CPT | Mod: 25,S$GLB,, | Performed by: STUDENT IN AN ORGANIZED HEALTH CARE EDUCATION/TRAINING PROGRAM

## 2021-05-31 PROCEDURE — 99204 PR OFFICE/OUTPT VISIT, NEW, LEVL IV, 45-59 MIN: ICD-10-PCS | Mod: 25,S$GLB,, | Performed by: STUDENT IN AN ORGANIZED HEALTH CARE EDUCATION/TRAINING PROGRAM

## 2021-05-31 PROCEDURE — 99999 PR PBB SHADOW E&M-EST. PATIENT-LVL III: ICD-10-PCS | Mod: PBBFAC,,, | Performed by: STUDENT IN AN ORGANIZED HEALTH CARE EDUCATION/TRAINING PROGRAM

## 2021-05-31 PROCEDURE — 20610 LARGE JOINT ASPIRATION/INJECTION: R GREATER TROCHANTERIC BURSA: ICD-10-PCS | Mod: LT,S$GLB,, | Performed by: STUDENT IN AN ORGANIZED HEALTH CARE EDUCATION/TRAINING PROGRAM

## 2021-05-31 PROCEDURE — 20610 DRAIN/INJ JOINT/BURSA W/O US: CPT | Mod: LT,S$GLB,, | Performed by: STUDENT IN AN ORGANIZED HEALTH CARE EDUCATION/TRAINING PROGRAM

## 2021-05-31 PROCEDURE — 1159F MED LIST DOCD IN RCRD: CPT | Mod: S$GLB,,, | Performed by: STUDENT IN AN ORGANIZED HEALTH CARE EDUCATION/TRAINING PROGRAM

## 2021-05-31 PROCEDURE — 99999 PR PBB SHADOW E&M-EST. PATIENT-LVL III: CPT | Mod: PBBFAC,,, | Performed by: STUDENT IN AN ORGANIZED HEALTH CARE EDUCATION/TRAINING PROGRAM

## 2021-05-31 RX ORDER — METHYLPREDNISOLONE ACETATE 40 MG/ML
40 INJECTION, SUSPENSION INTRA-ARTICULAR; INTRALESIONAL; INTRAMUSCULAR; SOFT TISSUE
Status: DISCONTINUED | OUTPATIENT
Start: 2021-05-31 | End: 2021-05-31 | Stop reason: HOSPADM

## 2021-05-31 RX ADMIN — METHYLPREDNISOLONE ACETATE 40 MG: 40 INJECTION, SUSPENSION INTRA-ARTICULAR; INTRALESIONAL; INTRAMUSCULAR; SOFT TISSUE at 07:05

## 2021-06-01 ASSESSMENT — ROUTINE ASSESSMENT OF PATIENT INDEX DATA (RAPID3)
MDHAQ FUNCTION SCORE: 1.3
PSYCHOLOGICAL DISTRESS SCORE: 2.2
TOTAL RAPID3 SCORE: 7.11
AM STIFFNESS SCORE: 1, YES
FATIGUE SCORE: 5
PAIN SCORE: 9
PATIENT GLOBAL ASSESSMENT SCORE: 8

## 2021-06-02 ENCOUNTER — OFFICE VISIT (OUTPATIENT)
Dept: FAMILY MEDICINE | Facility: CLINIC | Age: 76
End: 2021-06-02
Payer: MEDICARE

## 2021-06-02 VITALS
HEIGHT: 64 IN | BODY MASS INDEX: 39.27 KG/M2 | HEART RATE: 80 BPM | DIASTOLIC BLOOD PRESSURE: 80 MMHG | SYSTOLIC BLOOD PRESSURE: 134 MMHG | WEIGHT: 230 LBS | OXYGEN SATURATION: 97 %

## 2021-06-02 DIAGNOSIS — I11.9 HYPERTENSIVE HEART DISEASE, UNSPECIFIED WHETHER HEART FAILURE PRESENT: Primary | ICD-10-CM

## 2021-06-02 DIAGNOSIS — Z76.0 MEDICATION REFILL: ICD-10-CM

## 2021-06-02 DIAGNOSIS — M17.12 PRIMARY OSTEOARTHRITIS OF LEFT KNEE: ICD-10-CM

## 2021-06-02 DIAGNOSIS — M85.852 OSTEOPENIA OF LEFT HIP: ICD-10-CM

## 2021-06-02 DIAGNOSIS — R73.01 IFG (IMPAIRED FASTING GLUCOSE): ICD-10-CM

## 2021-06-02 PROCEDURE — 3075F PR MOST RECENT SYSTOLIC BLOOD PRESS GE 130-139MM HG: ICD-10-PCS | Mod: S$GLB,,, | Performed by: FAMILY MEDICINE

## 2021-06-02 PROCEDURE — 1101F PR PT FALLS ASSESS DOC 0-1 FALLS W/OUT INJ PAST YR: ICD-10-PCS | Mod: S$GLB,,, | Performed by: FAMILY MEDICINE

## 2021-06-02 PROCEDURE — 3288F PR FALLS RISK ASSESSMENT DOCUMENTED: ICD-10-PCS | Mod: S$GLB,,, | Performed by: FAMILY MEDICINE

## 2021-06-02 PROCEDURE — 3288F FALL RISK ASSESSMENT DOCD: CPT | Mod: S$GLB,,, | Performed by: FAMILY MEDICINE

## 2021-06-02 PROCEDURE — 99214 PR OFFICE/OUTPT VISIT, EST, LEVL IV, 30-39 MIN: ICD-10-PCS | Mod: S$GLB,,, | Performed by: FAMILY MEDICINE

## 2021-06-02 PROCEDURE — 3079F DIAST BP 80-89 MM HG: CPT | Mod: S$GLB,,, | Performed by: FAMILY MEDICINE

## 2021-06-02 PROCEDURE — 3075F SYST BP GE 130 - 139MM HG: CPT | Mod: S$GLB,,, | Performed by: FAMILY MEDICINE

## 2021-06-02 PROCEDURE — 1159F PR MEDICATION LIST DOCUMENTED IN MEDICAL RECORD: ICD-10-PCS | Mod: S$GLB,,, | Performed by: FAMILY MEDICINE

## 2021-06-02 PROCEDURE — 3079F PR MOST RECENT DIASTOLIC BLOOD PRESSURE 80-89 MM HG: ICD-10-PCS | Mod: S$GLB,,, | Performed by: FAMILY MEDICINE

## 2021-06-02 PROCEDURE — 1101F PT FALLS ASSESS-DOCD LE1/YR: CPT | Mod: S$GLB,,, | Performed by: FAMILY MEDICINE

## 2021-06-02 PROCEDURE — 1159F MED LIST DOCD IN RCRD: CPT | Mod: S$GLB,,, | Performed by: FAMILY MEDICINE

## 2021-06-02 PROCEDURE — 99214 OFFICE O/P EST MOD 30 MIN: CPT | Mod: S$GLB,,, | Performed by: FAMILY MEDICINE

## 2021-06-02 RX ORDER — BIMATOPROST 0.1 MG/ML
1 SOLUTION/ DROPS OPHTHALMIC NIGHTLY
COMMUNITY

## 2021-06-02 RX ORDER — TRAMADOL HYDROCHLORIDE 50 MG/1
50 TABLET ORAL EVERY 6 HOURS PRN
COMMUNITY
End: 2022-03-24

## 2021-06-02 RX ORDER — ALENDRONATE SODIUM 70 MG/1
70 TABLET ORAL
Qty: 12 TABLET | Refills: 1 | Status: SHIPPED | OUTPATIENT
Start: 2021-06-02 | End: 2021-12-02 | Stop reason: SDUPTHER

## 2021-06-02 RX ORDER — ROSUVASTATIN CALCIUM 20 MG/1
20 TABLET, COATED ORAL NIGHTLY
Qty: 90 TABLET | Refills: 1 | Status: SHIPPED | OUTPATIENT
Start: 2021-06-02 | End: 2021-12-02 | Stop reason: SDUPTHER

## 2021-06-07 ENCOUNTER — CLINICAL SUPPORT (OUTPATIENT)
Dept: REHABILITATION | Facility: HOSPITAL | Age: 76
End: 2021-06-07
Attending: STUDENT IN AN ORGANIZED HEALTH CARE EDUCATION/TRAINING PROGRAM
Payer: MEDICARE

## 2021-06-07 DIAGNOSIS — M25.50 ARTHRALGIA, UNSPECIFIED JOINT: ICD-10-CM

## 2021-06-07 DIAGNOSIS — M19.90 OSTEOARTHRITIS, UNSPECIFIED OSTEOARTHRITIS TYPE, UNSPECIFIED SITE: ICD-10-CM

## 2021-06-07 DIAGNOSIS — R29.898 IMPAIRED FLEXIBILITY OF LOWER EXTREMITY: ICD-10-CM

## 2021-06-07 DIAGNOSIS — R29.898 DECREASED STRENGTH OF LOWER EXTREMITY: ICD-10-CM

## 2021-06-07 PROCEDURE — 97161 PT EVAL LOW COMPLEX 20 MIN: CPT

## 2021-06-07 PROCEDURE — 97110 THERAPEUTIC EXERCISES: CPT

## 2021-06-14 ENCOUNTER — CLINICAL SUPPORT (OUTPATIENT)
Dept: REHABILITATION | Facility: HOSPITAL | Age: 76
End: 2021-06-14
Payer: MEDICARE

## 2021-06-14 DIAGNOSIS — R29.898 DECREASED STRENGTH OF LOWER EXTREMITY: ICD-10-CM

## 2021-06-14 DIAGNOSIS — R29.898 IMPAIRED FLEXIBILITY OF LOWER EXTREMITY: ICD-10-CM

## 2021-06-14 PROCEDURE — 97035 APP MDLTY 1+ULTRASOUND EA 15: CPT

## 2021-06-14 PROCEDURE — 97140 MANUAL THERAPY 1/> REGIONS: CPT

## 2021-06-14 PROCEDURE — 97110 THERAPEUTIC EXERCISES: CPT

## 2021-06-16 ENCOUNTER — CLINICAL SUPPORT (OUTPATIENT)
Dept: REHABILITATION | Facility: HOSPITAL | Age: 76
End: 2021-06-16
Payer: MEDICARE

## 2021-06-16 ENCOUNTER — DOCUMENTATION ONLY (OUTPATIENT)
Dept: REHABILITATION | Facility: HOSPITAL | Age: 76
End: 2021-06-16

## 2021-06-16 DIAGNOSIS — R29.898 IMPAIRED FLEXIBILITY OF LOWER EXTREMITY: ICD-10-CM

## 2021-06-16 DIAGNOSIS — R29.898 DECREASED STRENGTH OF LOWER EXTREMITY: ICD-10-CM

## 2021-06-16 PROCEDURE — 97140 MANUAL THERAPY 1/> REGIONS: CPT | Mod: CQ

## 2021-06-16 PROCEDURE — 97035 APP MDLTY 1+ULTRASOUND EA 15: CPT | Mod: CQ

## 2021-06-16 PROCEDURE — 97110 THERAPEUTIC EXERCISES: CPT | Mod: CQ

## 2021-06-21 ENCOUNTER — CLINICAL SUPPORT (OUTPATIENT)
Dept: REHABILITATION | Facility: HOSPITAL | Age: 76
End: 2021-06-21
Payer: MEDICARE

## 2021-06-21 DIAGNOSIS — R29.898 DECREASED STRENGTH OF LOWER EXTREMITY: ICD-10-CM

## 2021-06-21 DIAGNOSIS — R29.898 IMPAIRED FLEXIBILITY OF LOWER EXTREMITY: ICD-10-CM

## 2021-06-21 PROCEDURE — 97140 MANUAL THERAPY 1/> REGIONS: CPT

## 2021-06-21 PROCEDURE — 97110 THERAPEUTIC EXERCISES: CPT

## 2021-06-24 ENCOUNTER — CLINICAL SUPPORT (OUTPATIENT)
Dept: REHABILITATION | Facility: HOSPITAL | Age: 76
End: 2021-06-24
Attending: STUDENT IN AN ORGANIZED HEALTH CARE EDUCATION/TRAINING PROGRAM
Payer: MEDICARE

## 2021-06-24 DIAGNOSIS — R29.898 IMPAIRED FLEXIBILITY OF LOWER EXTREMITY: ICD-10-CM

## 2021-06-24 DIAGNOSIS — R29.898 DECREASED STRENGTH OF LOWER EXTREMITY: ICD-10-CM

## 2021-06-24 PROCEDURE — 97140 MANUAL THERAPY 1/> REGIONS: CPT

## 2021-06-24 PROCEDURE — 97110 THERAPEUTIC EXERCISES: CPT

## 2021-06-28 ENCOUNTER — CLINICAL SUPPORT (OUTPATIENT)
Dept: REHABILITATION | Facility: HOSPITAL | Age: 76
End: 2021-06-28
Payer: MEDICARE

## 2021-06-28 DIAGNOSIS — R29.898 IMPAIRED FLEXIBILITY OF LOWER EXTREMITY: ICD-10-CM

## 2021-06-28 DIAGNOSIS — R29.898 DECREASED STRENGTH OF LOWER EXTREMITY: ICD-10-CM

## 2021-06-28 PROCEDURE — 97140 MANUAL THERAPY 1/> REGIONS: CPT

## 2021-06-28 PROCEDURE — 97110 THERAPEUTIC EXERCISES: CPT

## 2021-06-29 ENCOUNTER — HOSPITAL ENCOUNTER (OUTPATIENT)
Dept: RADIOLOGY | Facility: HOSPITAL | Age: 76
Discharge: HOME | End: 2021-06-29
Attending: FAMILY MEDICINE
Payer: MEDICARE

## 2021-06-29 DIAGNOSIS — R92.8 ABNORMAL MAMMOGRAM: ICD-10-CM

## 2021-06-29 PROCEDURE — 76642 ULTRASOUND BREAST LIMITED: CPT | Mod: TC,PO,RT

## 2021-06-29 PROCEDURE — 77062 BREAST TOMOSYNTHESIS BI: CPT | Mod: TC,PO

## 2021-06-30 ENCOUNTER — DOCUMENTATION ONLY (OUTPATIENT)
Dept: REHABILITATION | Facility: HOSPITAL | Age: 76
End: 2021-06-30

## 2021-06-30 ENCOUNTER — CLINICAL SUPPORT (OUTPATIENT)
Dept: REHABILITATION | Facility: HOSPITAL | Age: 76
End: 2021-06-30
Payer: MEDICARE

## 2021-06-30 DIAGNOSIS — R29.898 DECREASED STRENGTH OF LOWER EXTREMITY: ICD-10-CM

## 2021-06-30 DIAGNOSIS — R29.898 IMPAIRED FLEXIBILITY OF LOWER EXTREMITY: ICD-10-CM

## 2021-06-30 PROCEDURE — 97110 THERAPEUTIC EXERCISES: CPT | Mod: CQ

## 2021-06-30 PROCEDURE — 97140 MANUAL THERAPY 1/> REGIONS: CPT | Mod: CQ

## 2021-07-06 NOTE — TELEPHONE ENCOUNTER
"Initial /62 (BP Location: Left arm)   Pulse 69   SpO2 96%  Estimated body mass index is 26.46 kg/m  as calculated from the following:    Height as of 5/20/21: 1.727 m (5' 8\").    Weight as of 6/16/21: 78.9 kg (174 lb). .      " ----- Message from Krystian Vinson sent at 7/29/2020  2:25 PM CDT -----  Regarding: Refill  Contact: Tierra Taylor  Pt needs refiill on amplodepine.   Send to [facility] on [address]  Pt#[phone]

## 2021-07-08 ENCOUNTER — TELEPHONE (OUTPATIENT)
Dept: REHABILITATION | Facility: HOSPITAL | Age: 76
End: 2021-07-08

## 2021-07-12 ENCOUNTER — CLINICAL SUPPORT (OUTPATIENT)
Dept: REHABILITATION | Facility: HOSPITAL | Age: 76
End: 2021-07-12
Payer: MEDICARE

## 2021-07-12 DIAGNOSIS — R29.898 DECREASED STRENGTH OF LOWER EXTREMITY: ICD-10-CM

## 2021-07-12 DIAGNOSIS — R29.898 IMPAIRED FLEXIBILITY OF LOWER EXTREMITY: ICD-10-CM

## 2021-07-12 PROCEDURE — 97140 MANUAL THERAPY 1/> REGIONS: CPT

## 2021-07-12 PROCEDURE — 97750 PHYSICAL PERFORMANCE TEST: CPT

## 2021-07-12 PROCEDURE — 97014 ELECTRIC STIMULATION THERAPY: CPT

## 2021-07-12 PROCEDURE — 97110 THERAPEUTIC EXERCISES: CPT

## 2021-07-13 ENCOUNTER — DOCUMENTATION ONLY (OUTPATIENT)
Dept: REHABILITATION | Facility: HOSPITAL | Age: 76
End: 2021-07-13

## 2021-07-21 ENCOUNTER — CLINICAL SUPPORT (OUTPATIENT)
Dept: REHABILITATION | Facility: HOSPITAL | Age: 76
End: 2021-07-21
Payer: MEDICARE

## 2021-07-21 ENCOUNTER — TELEPHONE (OUTPATIENT)
Dept: FAMILY MEDICINE | Facility: CLINIC | Age: 76
End: 2021-07-21

## 2021-07-21 DIAGNOSIS — R29.898 DECREASED STRENGTH OF LOWER EXTREMITY: ICD-10-CM

## 2021-07-21 DIAGNOSIS — R29.898 IMPAIRED FLEXIBILITY OF LOWER EXTREMITY: ICD-10-CM

## 2021-07-21 PROCEDURE — 97110 THERAPEUTIC EXERCISES: CPT | Mod: CQ

## 2021-07-21 PROCEDURE — 97140 MANUAL THERAPY 1/> REGIONS: CPT | Mod: CQ

## 2021-07-21 PROCEDURE — 97014 ELECTRIC STIMULATION THERAPY: CPT | Mod: CQ

## 2021-07-28 ENCOUNTER — CLINICAL SUPPORT (OUTPATIENT)
Dept: REHABILITATION | Facility: HOSPITAL | Age: 76
End: 2021-07-28
Payer: MEDICARE

## 2021-07-28 DIAGNOSIS — R29.898 IMPAIRED FLEXIBILITY OF LOWER EXTREMITY: ICD-10-CM

## 2021-07-28 DIAGNOSIS — R29.898 DECREASED STRENGTH OF LOWER EXTREMITY: ICD-10-CM

## 2021-07-28 PROCEDURE — 97110 THERAPEUTIC EXERCISES: CPT | Mod: CQ

## 2021-07-28 PROCEDURE — 97112 NEUROMUSCULAR REEDUCATION: CPT | Mod: CQ

## 2021-07-30 ENCOUNTER — CLINICAL SUPPORT (OUTPATIENT)
Dept: REHABILITATION | Facility: HOSPITAL | Age: 76
End: 2021-07-30
Payer: MEDICARE

## 2021-07-30 DIAGNOSIS — R29.898 DECREASED STRENGTH OF LOWER EXTREMITY: ICD-10-CM

## 2021-07-30 DIAGNOSIS — R29.898 IMPAIRED FLEXIBILITY OF LOWER EXTREMITY: ICD-10-CM

## 2021-07-30 PROCEDURE — 97140 MANUAL THERAPY 1/> REGIONS: CPT | Mod: CQ

## 2021-07-30 PROCEDURE — 97014 ELECTRIC STIMULATION THERAPY: CPT | Mod: CQ

## 2021-07-30 PROCEDURE — 97110 THERAPEUTIC EXERCISES: CPT | Mod: CQ

## 2021-08-04 ENCOUNTER — CLINICAL SUPPORT (OUTPATIENT)
Dept: REHABILITATION | Facility: HOSPITAL | Age: 76
End: 2021-08-04
Payer: MEDICARE

## 2021-08-04 DIAGNOSIS — R29.898 IMPAIRED FLEXIBILITY OF LOWER EXTREMITY: ICD-10-CM

## 2021-08-04 DIAGNOSIS — R29.898 DECREASED STRENGTH OF LOWER EXTREMITY: ICD-10-CM

## 2021-08-04 PROCEDURE — 97110 THERAPEUTIC EXERCISES: CPT | Mod: CQ

## 2021-08-12 ENCOUNTER — CLINICAL SUPPORT (OUTPATIENT)
Dept: REHABILITATION | Facility: HOSPITAL | Age: 76
End: 2021-08-12
Payer: MEDICARE

## 2021-08-12 DIAGNOSIS — R29.898 IMPAIRED FLEXIBILITY OF LOWER EXTREMITY: ICD-10-CM

## 2021-08-12 DIAGNOSIS — R29.898 DECREASED STRENGTH OF LOWER EXTREMITY: ICD-10-CM

## 2021-08-12 PROCEDURE — 97110 THERAPEUTIC EXERCISES: CPT | Mod: KX

## 2021-08-12 PROCEDURE — 97530 THERAPEUTIC ACTIVITIES: CPT | Mod: KX

## 2021-09-22 ENCOUNTER — OFFICE VISIT (OUTPATIENT)
Dept: CARDIOLOGY | Facility: CLINIC | Age: 76
End: 2021-09-22
Payer: MEDICARE

## 2021-09-22 VITALS
HEART RATE: 85 BPM | HEIGHT: 64 IN | WEIGHT: 229 LBS | DIASTOLIC BLOOD PRESSURE: 88 MMHG | OXYGEN SATURATION: 96 % | SYSTOLIC BLOOD PRESSURE: 158 MMHG | BODY MASS INDEX: 39.09 KG/M2

## 2021-09-22 DIAGNOSIS — I50.31 ACUTE DIASTOLIC HEART FAILURE: Primary | ICD-10-CM

## 2021-09-22 DIAGNOSIS — E78.2 MIXED HYPERLIPIDEMIA: ICD-10-CM

## 2021-09-22 DIAGNOSIS — I10 ESSENTIAL HYPERTENSION: ICD-10-CM

## 2021-09-22 DIAGNOSIS — I10 HYPERTENSION, UNSPECIFIED TYPE: ICD-10-CM

## 2021-09-22 DIAGNOSIS — R94.31 NONSPECIFIC ABNORMAL ELECTROCARDIOGRAM (ECG) (EKG): ICD-10-CM

## 2021-09-22 DIAGNOSIS — M1A.0710 CHRONIC IDIOPATHIC GOUT INVOLVING TOE OF RIGHT FOOT WITHOUT TOPHUS: ICD-10-CM

## 2021-09-22 DIAGNOSIS — I11.9 HYPERTENSIVE HEART DISEASE WITHOUT HEART FAILURE: ICD-10-CM

## 2021-09-22 DIAGNOSIS — E66.01 SEVERE OBESITY: ICD-10-CM

## 2021-09-22 DIAGNOSIS — G47.33 OBSTRUCTIVE SLEEP APNEA: ICD-10-CM

## 2021-09-22 DIAGNOSIS — R94.39 ABNORMAL NUCLEAR STRESS TEST: ICD-10-CM

## 2021-09-22 DIAGNOSIS — M17.12 PRIMARY OSTEOARTHRITIS OF LEFT KNEE: ICD-10-CM

## 2021-09-22 DIAGNOSIS — R06.09 DYSPNEA ON EXERTION: ICD-10-CM

## 2021-09-22 PROCEDURE — 99214 PR OFFICE/OUTPT VISIT, EST, LEVL IV, 30-39 MIN: ICD-10-PCS | Mod: S$GLB,,, | Performed by: INTERNAL MEDICINE

## 2021-09-22 PROCEDURE — 3079F DIAST BP 80-89 MM HG: CPT | Mod: CPTII,S$GLB,, | Performed by: INTERNAL MEDICINE

## 2021-09-22 PROCEDURE — 1159F MED LIST DOCD IN RCRD: CPT | Mod: CPTII,S$GLB,, | Performed by: INTERNAL MEDICINE

## 2021-09-22 PROCEDURE — 3079F PR MOST RECENT DIASTOLIC BLOOD PRESSURE 80-89 MM HG: ICD-10-PCS | Mod: CPTII,S$GLB,, | Performed by: INTERNAL MEDICINE

## 2021-09-22 PROCEDURE — 3077F PR MOST RECENT SYSTOLIC BLOOD PRESSURE >= 140 MM HG: ICD-10-PCS | Mod: CPTII,S$GLB,, | Performed by: INTERNAL MEDICINE

## 2021-09-22 PROCEDURE — 99214 OFFICE O/P EST MOD 30 MIN: CPT | Mod: S$GLB,,, | Performed by: INTERNAL MEDICINE

## 2021-09-22 PROCEDURE — 1160F PR REVIEW ALL MEDS BY PRESCRIBER/CLIN PHARMACIST DOCUMENTED: ICD-10-PCS | Mod: CPTII,S$GLB,, | Performed by: INTERNAL MEDICINE

## 2021-09-22 PROCEDURE — 3288F FALL RISK ASSESSMENT DOCD: CPT | Mod: CPTII,S$GLB,, | Performed by: INTERNAL MEDICINE

## 2021-09-22 PROCEDURE — 93000 EKG 12-LEAD: ICD-10-PCS | Mod: S$GLB,,, | Performed by: INTERNAL MEDICINE

## 2021-09-22 PROCEDURE — 1101F PR PT FALLS ASSESS DOC 0-1 FALLS W/OUT INJ PAST YR: ICD-10-PCS | Mod: CPTII,S$GLB,, | Performed by: INTERNAL MEDICINE

## 2021-09-22 PROCEDURE — 3288F PR FALLS RISK ASSESSMENT DOCUMENTED: ICD-10-PCS | Mod: CPTII,S$GLB,, | Performed by: INTERNAL MEDICINE

## 2021-09-22 PROCEDURE — 1101F PT FALLS ASSESS-DOCD LE1/YR: CPT | Mod: CPTII,S$GLB,, | Performed by: INTERNAL MEDICINE

## 2021-09-22 PROCEDURE — 1126F PR PAIN SEVERITY QUANTIFIED, NO PAIN PRESENT: ICD-10-PCS | Mod: CPTII,S$GLB,, | Performed by: INTERNAL MEDICINE

## 2021-09-22 PROCEDURE — 93000 ELECTROCARDIOGRAM COMPLETE: CPT | Mod: S$GLB,,, | Performed by: INTERNAL MEDICINE

## 2021-09-22 PROCEDURE — 1160F RVW MEDS BY RX/DR IN RCRD: CPT | Mod: CPTII,S$GLB,, | Performed by: INTERNAL MEDICINE

## 2021-09-22 PROCEDURE — 1159F PR MEDICATION LIST DOCUMENTED IN MEDICAL RECORD: ICD-10-PCS | Mod: CPTII,S$GLB,, | Performed by: INTERNAL MEDICINE

## 2021-09-22 PROCEDURE — 3077F SYST BP >= 140 MM HG: CPT | Mod: CPTII,S$GLB,, | Performed by: INTERNAL MEDICINE

## 2021-09-22 PROCEDURE — 1126F AMNT PAIN NOTED NONE PRSNT: CPT | Mod: CPTII,S$GLB,, | Performed by: INTERNAL MEDICINE

## 2021-10-18 ENCOUNTER — CLINICAL SUPPORT (OUTPATIENT)
Dept: FAMILY MEDICINE | Facility: CLINIC | Age: 76
End: 2021-10-18
Payer: MEDICARE

## 2021-10-18 DIAGNOSIS — I11.9 HYPERTENSIVE HEART DISEASE, UNSPECIFIED WHETHER HEART FAILURE PRESENT: ICD-10-CM

## 2021-10-18 RX ORDER — CARVEDILOL 12.5 MG/1
12.5 TABLET ORAL 2 TIMES DAILY
Qty: 180 TABLET | Refills: 1 | Status: SHIPPED | OUTPATIENT
Start: 2021-10-18 | End: 2021-12-02 | Stop reason: SDUPTHER

## 2021-10-18 RX ORDER — HYDROCHLOROTHIAZIDE 25 MG/1
25 TABLET ORAL NIGHTLY
Qty: 90 TABLET | Refills: 1 | Status: SHIPPED | OUTPATIENT
Start: 2021-10-18 | End: 2021-12-02 | Stop reason: SDUPTHER

## 2021-10-18 RX ORDER — AMLODIPINE BESYLATE 5 MG/1
5 TABLET ORAL DAILY
Qty: 90 TABLET | Refills: 1 | Status: SHIPPED | OUTPATIENT
Start: 2021-10-18 | End: 2021-12-02 | Stop reason: SDUPTHER

## 2021-10-18 RX ORDER — ALLOPURINOL 100 MG/1
100 TABLET ORAL DAILY
Qty: 90 TABLET | Refills: 1 | Status: SHIPPED | OUTPATIENT
Start: 2021-10-18 | End: 2021-12-02 | Stop reason: SDUPTHER

## 2021-12-01 LAB
ALBUMIN SERPL-MCNC: 4 G/DL (ref 3.6–5.1)
ALBUMIN/GLOB SERPL: 1.3 (CALC) (ref 1–2.5)
ALP SERPL-CCNC: 64 U/L (ref 37–153)
ALT SERPL-CCNC: 18 U/L (ref 6–29)
AST SERPL-CCNC: 19 U/L (ref 10–35)
BILIRUB SERPL-MCNC: 1 MG/DL (ref 0.2–1.2)
BUN SERPL-MCNC: 18 MG/DL (ref 7–25)
BUN/CREAT SERPL: ABNORMAL (CALC) (ref 6–22)
CALCIUM SERPL-MCNC: 9.6 MG/DL (ref 8.6–10.4)
CHLORIDE SERPL-SCNC: 100 MMOL/L (ref 98–110)
CO2 SERPL-SCNC: 34 MMOL/L (ref 20–32)
CREAT SERPL-MCNC: 0.72 MG/DL (ref 0.6–0.93)
GLOBULIN SER CALC-MCNC: 3 G/DL (CALC) (ref 1.9–3.7)
GLUCOSE SERPL-MCNC: 96 MG/DL (ref 65–99)
HBA1C MFR BLD: 5.4 % OF TOTAL HGB
POTASSIUM SERPL-SCNC: 3.7 MMOL/L (ref 3.5–5.3)
PROT SERPL-MCNC: 7 G/DL (ref 6.1–8.1)
SODIUM SERPL-SCNC: 142 MMOL/L (ref 135–146)

## 2021-12-02 ENCOUNTER — OFFICE VISIT (OUTPATIENT)
Dept: FAMILY MEDICINE | Facility: CLINIC | Age: 76
End: 2021-12-02
Payer: MEDICARE

## 2021-12-02 VITALS
DIASTOLIC BLOOD PRESSURE: 86 MMHG | BODY MASS INDEX: 39.27 KG/M2 | HEIGHT: 64 IN | HEART RATE: 84 BPM | WEIGHT: 230 LBS | SYSTOLIC BLOOD PRESSURE: 136 MMHG

## 2021-12-02 DIAGNOSIS — Z23 INFLUENZA VACCINATION ADMINISTERED AT CURRENT VISIT: ICD-10-CM

## 2021-12-02 DIAGNOSIS — N76.0 ACUTE VAGINITIS: ICD-10-CM

## 2021-12-02 DIAGNOSIS — Z91.81 RISK FOR FALLS: ICD-10-CM

## 2021-12-02 DIAGNOSIS — M85.852 OSTEOPENIA OF LEFT HIP: ICD-10-CM

## 2021-12-02 DIAGNOSIS — Z76.0 MEDICATION REFILL: ICD-10-CM

## 2021-12-02 DIAGNOSIS — I11.9 HYPERTENSIVE HEART DISEASE, UNSPECIFIED WHETHER HEART FAILURE PRESENT: Primary | ICD-10-CM

## 2021-12-02 DIAGNOSIS — Z99.89 AMBULATES WITH CANE: ICD-10-CM

## 2021-12-02 PROCEDURE — 1160F PR REVIEW ALL MEDS BY PRESCRIBER/CLIN PHARMACIST DOCUMENTED: ICD-10-PCS | Mod: S$GLB,,, | Performed by: FAMILY MEDICINE

## 2021-12-02 PROCEDURE — 1159F PR MEDICATION LIST DOCUMENTED IN MEDICAL RECORD: ICD-10-PCS | Mod: S$GLB,,, | Performed by: FAMILY MEDICINE

## 2021-12-02 PROCEDURE — G0008 ADMIN INFLUENZA VIRUS VAC: HCPCS | Mod: S$GLB,,, | Performed by: FAMILY MEDICINE

## 2021-12-02 PROCEDURE — 90662 IIV NO PRSV INCREASED AG IM: CPT | Mod: S$GLB,,, | Performed by: FAMILY MEDICINE

## 2021-12-02 PROCEDURE — G0008 FLU VACCINE - QUADRIVALENT - HIGH DOSE (65+) PRESERVATIVE FREE IM: ICD-10-PCS | Mod: S$GLB,,, | Performed by: FAMILY MEDICINE

## 2021-12-02 PROCEDURE — 99214 OFFICE O/P EST MOD 30 MIN: CPT | Mod: 25,S$GLB,, | Performed by: FAMILY MEDICINE

## 2021-12-02 PROCEDURE — 1160F RVW MEDS BY RX/DR IN RCRD: CPT | Mod: S$GLB,,, | Performed by: FAMILY MEDICINE

## 2021-12-02 PROCEDURE — 90662 FLU VACCINE - QUADRIVALENT - HIGH DOSE (65+) PRESERVATIVE FREE IM: ICD-10-PCS | Mod: S$GLB,,, | Performed by: FAMILY MEDICINE

## 2021-12-02 PROCEDURE — 1159F MED LIST DOCD IN RCRD: CPT | Mod: S$GLB,,, | Performed by: FAMILY MEDICINE

## 2021-12-02 PROCEDURE — 99214 PR OFFICE/OUTPT VISIT, EST, LEVL IV, 30-39 MIN: ICD-10-PCS | Mod: 25,S$GLB,, | Performed by: FAMILY MEDICINE

## 2021-12-02 RX ORDER — AMLODIPINE BESYLATE 5 MG/1
5 TABLET ORAL DAILY
Qty: 90 TABLET | Refills: 1 | Status: SHIPPED | OUTPATIENT
Start: 2021-12-02 | End: 2022-03-24

## 2021-12-02 RX ORDER — FLUCONAZOLE 150 MG/1
150 TABLET ORAL DAILY
Qty: 1 TABLET | Refills: 0 | Status: SHIPPED | OUTPATIENT
Start: 2021-12-02 | End: 2021-12-03

## 2021-12-02 RX ORDER — HYDROCHLOROTHIAZIDE 25 MG/1
25 TABLET ORAL NIGHTLY
Qty: 90 TABLET | Refills: 1 | Status: SHIPPED | OUTPATIENT
Start: 2021-12-02 | End: 2022-03-24

## 2021-12-02 RX ORDER — ALLOPURINOL 100 MG/1
100 TABLET ORAL DAILY
Qty: 90 TABLET | Refills: 1 | Status: SHIPPED | OUTPATIENT
Start: 2021-12-02

## 2021-12-02 RX ORDER — CARVEDILOL 12.5 MG/1
12.5 TABLET ORAL 2 TIMES DAILY
Qty: 180 TABLET | Refills: 1 | Status: SHIPPED | OUTPATIENT
Start: 2021-12-02

## 2021-12-02 RX ORDER — METRONIDAZOLE 500 MG/1
500 TABLET ORAL EVERY 12 HOURS
Qty: 14 TABLET | Refills: 0 | Status: SHIPPED | OUTPATIENT
Start: 2021-12-02 | End: 2022-03-24

## 2021-12-02 RX ORDER — ALENDRONATE SODIUM 70 MG/1
70 TABLET ORAL
Qty: 12 TABLET | Refills: 1 | Status: SHIPPED | OUTPATIENT
Start: 2021-12-02 | End: 2023-06-05

## 2021-12-02 RX ORDER — ROSUVASTATIN CALCIUM 20 MG/1
20 TABLET, COATED ORAL NIGHTLY
Qty: 90 TABLET | Refills: 1 | Status: SHIPPED | OUTPATIENT
Start: 2021-12-02

## 2022-01-25 ENCOUNTER — OFFICE VISIT (OUTPATIENT)
Dept: ORTHOPEDICS | Facility: CLINIC | Age: 77
End: 2022-01-25
Payer: MEDICARE

## 2022-01-25 VITALS — BODY MASS INDEX: 39.27 KG/M2 | HEIGHT: 64 IN | WEIGHT: 230 LBS

## 2022-01-25 DIAGNOSIS — M70.22 OLECRANON BURSITIS OF LEFT ELBOW: Primary | ICD-10-CM

## 2022-01-25 DIAGNOSIS — M19.012 GLENOHUMERAL ARTHRITIS, LEFT: ICD-10-CM

## 2022-01-25 DIAGNOSIS — M17.11 PRIMARY OSTEOARTHRITIS OF RIGHT KNEE: ICD-10-CM

## 2022-01-25 PROCEDURE — 20610 DRAIN/INJ JOINT/BURSA W/O US: CPT | Mod: LT,S$GLB,, | Performed by: ORTHOPAEDIC SURGERY

## 2022-01-25 PROCEDURE — 99214 OFFICE O/P EST MOD 30 MIN: CPT | Mod: 25,S$GLB,, | Performed by: ORTHOPAEDIC SURGERY

## 2022-01-25 PROCEDURE — 1125F AMNT PAIN NOTED PAIN PRSNT: CPT | Mod: S$GLB,,, | Performed by: ORTHOPAEDIC SURGERY

## 2022-01-25 PROCEDURE — 3288F PR FALLS RISK ASSESSMENT DOCUMENTED: ICD-10-PCS | Mod: S$GLB,,, | Performed by: ORTHOPAEDIC SURGERY

## 2022-01-25 PROCEDURE — 1159F PR MEDICATION LIST DOCUMENTED IN MEDICAL RECORD: ICD-10-PCS | Mod: S$GLB,,, | Performed by: ORTHOPAEDIC SURGERY

## 2022-01-25 PROCEDURE — 1101F PR PT FALLS ASSESS DOC 0-1 FALLS W/OUT INJ PAST YR: ICD-10-PCS | Mod: S$GLB,,, | Performed by: ORTHOPAEDIC SURGERY

## 2022-01-25 PROCEDURE — 20610 LARGE JOINT ASPIRATION/INJECTION: L GLENOHUMERAL: ICD-10-PCS | Mod: LT,S$GLB,, | Performed by: ORTHOPAEDIC SURGERY

## 2022-01-25 PROCEDURE — 99214 PR OFFICE/OUTPT VISIT, EST, LEVL IV, 30-39 MIN: ICD-10-PCS | Mod: 25,S$GLB,, | Performed by: ORTHOPAEDIC SURGERY

## 2022-01-25 PROCEDURE — 1101F PT FALLS ASSESS-DOCD LE1/YR: CPT | Mod: S$GLB,,, | Performed by: ORTHOPAEDIC SURGERY

## 2022-01-25 PROCEDURE — 1125F PR PAIN SEVERITY QUANTIFIED, PAIN PRESENT: ICD-10-PCS | Mod: S$GLB,,, | Performed by: ORTHOPAEDIC SURGERY

## 2022-01-25 PROCEDURE — 1159F MED LIST DOCD IN RCRD: CPT | Mod: S$GLB,,, | Performed by: ORTHOPAEDIC SURGERY

## 2022-01-25 PROCEDURE — 20605 DRAIN/INJ JOINT/BURSA W/O US: CPT | Mod: 59,LT,S$GLB, | Performed by: ORTHOPAEDIC SURGERY

## 2022-01-25 PROCEDURE — 3288F FALL RISK ASSESSMENT DOCD: CPT | Mod: S$GLB,,, | Performed by: ORTHOPAEDIC SURGERY

## 2022-01-25 PROCEDURE — 20605 PR DRAIN/INJECT INTERMEDIATE JOINT/BURSA: ICD-10-PCS | Mod: 59,LT,S$GLB, | Performed by: ORTHOPAEDIC SURGERY

## 2022-01-25 RX ORDER — TRIAMCINOLONE ACETONIDE 40 MG/ML
40 INJECTION, SUSPENSION INTRA-ARTICULAR; INTRAMUSCULAR
Status: DISCONTINUED | OUTPATIENT
Start: 2022-01-25 | End: 2022-01-25 | Stop reason: HOSPADM

## 2022-01-25 RX ADMIN — TRIAMCINOLONE ACETONIDE 40 MG: 40 INJECTION, SUSPENSION INTRA-ARTICULAR; INTRAMUSCULAR at 11:01

## 2022-01-25 NOTE — PROCEDURES
Large Joint Aspiration/Injection: L glenohumeral    Date/Time: 1/25/2022 11:15 AM  Performed by: Matthew Marquez MD  Authorized by: Matthew Marquez MD     Consent Done?:  Yes (Verbal)  Indications:  Pain  Site marked: the procedure site was marked    Timeout: prior to procedure the correct patient, procedure, and site was verified    Prep: patient was prepped and draped in usual sterile fashion      Local anesthesia used?: Yes    Local anesthetic:  Lidocaine 1% without epinephrine    Details:  Needle Size:  25 G  Ultrasonic Guidance for needle placement?: No    Location:  Shoulder  Site:  L glenohumeral  Medications:  40 mg triamcinolone acetonide 40 mg/mL  Patient tolerance:  Patient tolerated the procedure well with no immediate complications    Date/Time: 1/25/2022 11:15 AM  Performed by: Matthew Marquez MD  Authorized by: Matthew Marquez MD     Site:  L elbow  Intermediate Joint Aspiration/Injection: L elbow    Date/Time: 1/25/2022 11:15 AM  Performed by: Matthew Marquez MD  Authorized by: Matthew Marquez MD     Consent Done?: Yes (Verbal)  Indications: Joint swelling  Site marked: The procedure site was marked    Timeout: Prior to procedure the correct patient, procedure, and site was verified      Location:  Elbow  Site:  L elbow  Prep: Patient was prepped and draped in usual sterile fashion    Needle size:  18 G  Aspirate:  Bloody  Patient tolerance:  Patient tolerated the procedure well with no immediate complications

## 2022-01-25 NOTE — PROGRESS NOTES
[facility] ORTHOPEDICS    Subjective:     Chief Complaint:   Chief Complaint   Patient presents with    Left Shoulder - Pain     Patient is having left shoulder pain, this pain started about a week ago, limited ROM.    Left Elbow - Pain     Patient is having left elbow pain, she states that it feels like pins and needles, some numbness in fingers, very painful to bend.       Past Medical History:   Diagnosis Date    Arthritis     Hypertension     Sleep apnea        Past Surgical History:   Procedure Laterality Date    BREAST BIOPSY      CERVICAL DISCECTOMY      FATTY TUMOR      HYSTERECTOMY      KNEE SURGERY         Current Outpatient Medications   Medication Sig    alendronate (FOSAMAX) 70 MG tablet Take 1 tablet (70 mg total) by mouth every 7 days. For osteopenia    allopurinoL (ZYLOPRIM) 100 MG tablet Take 1 tablet (100 mg total) by mouth once daily.    amLODIPine (NORVASC) 5 MG tablet Take 1 tablet (5 mg total) by mouth once daily.    aspirin 81 MG Chew Take 81 mg by mouth every evening.     bimatoprost (LUMIGAN) 0.01 % Drop 1 drop every evening.    calcium-vitamin D 600 mg(1,500mg) -400 unit Tab Take 1 tablet by mouth 2 (two) times a day.    carvediloL (COREG) 12.5 MG tablet Take 1 tablet (12.5 mg total) by mouth 2 (two) times daily.    hydroCHLOROthiazide (HYDRODIURIL) 25 MG tablet Take 1 tablet (25 mg total) by mouth every evening.    metroNIDAZOLE (FLAGYL) 500 MG tablet Take 1 tablet (500 mg total) by mouth every 12 (twelve) hours.    mometasone (NASONEX) 50 mcg/actuation nasal spray 2 sprays in each nostril    naproxen sodium (ANAPROX) 220 MG tablet Take 220 mg by mouth 2 (two) times daily with meals.    rosuvastatin (CRESTOR) 20 MG tablet Take 1 tablet (20 mg total) by mouth every evening.    traMADoL (ULTRAM) 50 mg tablet Take 50 mg by mouth every 6 (six) hours as needed for Pain.     No current facility-administered medications for this visit.       Review of patient's allergies  indicates:   Allergen Reactions    Lisinopril Other (See Comments)     Cough    Penicillins     Tramadol      causes itching       Family History   Problem Relation Age of Onset    Hypertension Mother     Hypertension Father        Social History     Socioeconomic History    Marital status: Single   Tobacco Use    Smoking status: Never Smoker    Smokeless tobacco: Never Used   Substance and Sexual Activity    Alcohol use: No    Drug use: No       History of present illness:  Ms. Mayorga comes in today with multiple orthopedic complaints.  She complains of left shoulder pain, left elbow swelling, and right knee pain.  She has known severe OA of her right knee was originally scheduled for right total knee arthroplasty with Dr. Romero prior to his detention.  This was canceled due to the resurgence of COVID.  She is interested in proceeding with her right total knee arthroplasty with Dr. Marquez.  As far as the left elbow is concerned, she has swelling along the posterior aspect of her left elbow has been there for several weeks.  She denies any specific injury or trauma.  On her left shoulder, she has pain and decreased range of motion.      Review of Systems:    Constitution: Negative for chills, fever, and sweats.  Negative for unexplained weight loss.    HENT:  Negative for headaches and blurry vision.    Cardiovascular:Negative for chest pain or irregular heart beat. Negative for hypertension.    Respiratory:  Negative for cough and shortness of breath.    Gastrointestinal: Negative for abdominal pain, heartburn, melena, nausea, and vomitting.    Genitourinary:  Negative bladder incontinence and dysuria.    Musculoskeletal:  See HPI for details.     Neurological: Negative for numbness.    Psychiatric/Behavioral: Negative for depression.  The patient is not nervous/anxious.      Endocrine: Negative for polyuria    Hematologic/Lymphatic: Negative for bleeding problem.  Does not bruise/bleed easily.    Skin:  Negative for poor would healing and rash    Objective:      Physical Examination:    Vital Signs:  There were no vitals filed for this visit.    Body mass index is 39.48 kg/m².    This a well-developed, well nourished patient in no acute distress.  They are alert and oriented and cooperative to examination.        Right knee exam:  Skin right knee is clean dry and intact.  There is no erythema or ecchymosis.  There are no signs or symptoms of infection.  Patient has a varus deformity of the right knee.  Right calf is soft and nontender.  Patient is neurovascularly intact throughout the right lower extremity.  Right knee active range of motion is approximately 0-90 degrees.  Right knee is stable to varus and valgus stresses.    Left upper extremity exam:  Skin to left upper extremity is clean dry and intact.  There is no erythema or ecchymosis.  There are no signs or symptoms of infection.  Patient is neurovascularly intact throughout the left upper extremity.  She has full active range of motion left elbow flexion/extension left forearm pronation/supination.  She does have a palpable/ballotable fluid collection over her left olecranon consistent with olecranon bursitis.  For her left shoulder, she had decreased range of motion.  She can forward flex to approximately 140°, externally rotate approximately 45°.  Internally rotate to the level of her sacrum.  She does have crepitus with range of motion of her left shoulder.  The left shoulder is weak to any manual muscle testing.    Pertinent New Results:    XRAY Report / Interpretation:   Two views were taken of her left shoulder today:  AP and Y-view.  They reveal no acute fractures or dislocations.  Patient does have severe glenohumeral osteoarthritis with bone-on-bone deformity and multiple osteophytes present.  Visualized soft tissues are unremarkable.    Two views were taken of her left elbow today:  AP and lateral views.  They reveal no acute fractures or  "dislocations.  Visualized soft tissues are unremarkable.    Assessment/Plan:      1.  Right knee osteoarthritis, severe.  2.  Left shoulder glenohumeral osteoarthritis, severe.  3.  Left elbow olecranon bursitis.    I injected her left shoulder today with Kenalog and lidocaine.  She tolerated this well.  Advised her the definitive treatment for this will involve a total shoulder arthroplasty at some point in the future.  She would like to proceed with a right total knee arthroplasty 1st is been the most problematic and long-term problem for her.  For her left elbow, aspirate approximately 15 cc of bloody fluid.  She tolerated this well.  Did place a compression Ace bandage to help reduce the risk of recurrence.  I did advise her that is a high likelihood of recurrence with these.  They require multiple aspirations sometimes.  As far as her right knee is concerned, risks and benefits of a total knee arthroplasty were explained to her in detail.  We discussed the risk of deep vein thrombosis and infection.  All of her questions were answered.  She clearly understood and wished to proceed.  Consents were obtained.    Risks of the procedure were reviewed with the patient.  This includes, but is not limited to:  infection, bleeding, pain, swelling, decreased range of motion, nerve injury/damage, numbness, leg length discrepancy, wound dehiscence, hardware failure, deep vein thrombosis, pulmonary embolism, reflex sympathetic dystrophy, and possible need for further surgery.    Colton Lay, Physician Assistant, served in the capacity as a "scribe" for this patient encounter.  A "face-to-face" encounter occurred with Dr. Matthew Marquez on this date.  The treatment plan and medical decision-making is outlined above. Patient was seen and examined with a chaperone.       This note was created using Dragon voice recognition software that occasionally misinterpreted phrases or words.          "

## 2022-01-27 ENCOUNTER — PATIENT MESSAGE (OUTPATIENT)
Dept: ORTHOPEDICS | Facility: CLINIC | Age: 77
End: 2022-01-27
Payer: MEDICARE

## 2022-01-27 RX ORDER — METHYLPREDNISOLONE ACETATE 40 MG/ML
40 INJECTION, SUSPENSION INTRA-ARTICULAR; INTRALESIONAL; INTRAMUSCULAR; SOFT TISSUE
Status: DISCONTINUED | OUTPATIENT
Start: 2022-01-25 | End: 2022-03-22

## 2022-02-10 DIAGNOSIS — Z01.818 PRE-OPERATIVE EXAM: ICD-10-CM

## 2022-02-10 DIAGNOSIS — M17.11 PRIMARY OSTEOARTHRITIS OF RIGHT KNEE: Primary | ICD-10-CM

## 2022-03-24 ENCOUNTER — OFFICE VISIT (OUTPATIENT)
Dept: CARDIOLOGY | Facility: CLINIC | Age: 77
End: 2022-03-24
Payer: MEDICARE

## 2022-03-24 VITALS
OXYGEN SATURATION: 97 % | WEIGHT: 228 LBS | HEART RATE: 73 BPM | BODY MASS INDEX: 38.93 KG/M2 | HEIGHT: 64 IN | DIASTOLIC BLOOD PRESSURE: 122 MMHG | SYSTOLIC BLOOD PRESSURE: 190 MMHG

## 2022-03-24 DIAGNOSIS — I50.31 ACUTE DIASTOLIC HEART FAILURE: Primary | ICD-10-CM

## 2022-03-24 DIAGNOSIS — M17.12 PRIMARY OSTEOARTHRITIS OF LEFT KNEE: ICD-10-CM

## 2022-03-24 DIAGNOSIS — I10 UNCONTROLLED HYPERTENSION: ICD-10-CM

## 2022-03-24 DIAGNOSIS — I50.32 HYPERTENSIVE HEART DISEASE WITH CHRONIC DIASTOLIC CONGESTIVE HEART FAILURE: ICD-10-CM

## 2022-03-24 DIAGNOSIS — I10 ESSENTIAL HYPERTENSION: ICD-10-CM

## 2022-03-24 DIAGNOSIS — E78.2 MIXED HYPERLIPIDEMIA: ICD-10-CM

## 2022-03-24 DIAGNOSIS — E66.01 SEVERE OBESITY: ICD-10-CM

## 2022-03-24 DIAGNOSIS — I11.0 HYPERTENSIVE HEART DISEASE WITH CHRONIC DIASTOLIC CONGESTIVE HEART FAILURE: ICD-10-CM

## 2022-03-24 DIAGNOSIS — I27.29 OTHER SECONDARY PULMONARY HYPERTENSION: ICD-10-CM

## 2022-03-24 PROCEDURE — 3080F DIAST BP >= 90 MM HG: CPT | Mod: CPTII,S$GLB,, | Performed by: INTERNAL MEDICINE

## 2022-03-24 PROCEDURE — 1160F RVW MEDS BY RX/DR IN RCRD: CPT | Mod: CPTII,S$GLB,, | Performed by: INTERNAL MEDICINE

## 2022-03-24 PROCEDURE — 1126F PR PAIN SEVERITY QUANTIFIED, NO PAIN PRESENT: ICD-10-PCS | Mod: CPTII,S$GLB,, | Performed by: INTERNAL MEDICINE

## 2022-03-24 PROCEDURE — 3288F PR FALLS RISK ASSESSMENT DOCUMENTED: ICD-10-PCS | Mod: CPTII,S$GLB,, | Performed by: INTERNAL MEDICINE

## 2022-03-24 PROCEDURE — 1160F PR REVIEW ALL MEDS BY PRESCRIBER/CLIN PHARMACIST DOCUMENTED: ICD-10-PCS | Mod: CPTII,S$GLB,, | Performed by: INTERNAL MEDICINE

## 2022-03-24 PROCEDURE — 1126F AMNT PAIN NOTED NONE PRSNT: CPT | Mod: CPTII,S$GLB,, | Performed by: INTERNAL MEDICINE

## 2022-03-24 PROCEDURE — 1101F PR PT FALLS ASSESS DOC 0-1 FALLS W/OUT INJ PAST YR: ICD-10-PCS | Mod: CPTII,S$GLB,, | Performed by: INTERNAL MEDICINE

## 2022-03-24 PROCEDURE — 99214 PR OFFICE/OUTPT VISIT, EST, LEVL IV, 30-39 MIN: ICD-10-PCS | Mod: S$GLB,,, | Performed by: INTERNAL MEDICINE

## 2022-03-24 PROCEDURE — 1101F PT FALLS ASSESS-DOCD LE1/YR: CPT | Mod: CPTII,S$GLB,, | Performed by: INTERNAL MEDICINE

## 2022-03-24 PROCEDURE — 3080F PR MOST RECENT DIASTOLIC BLOOD PRESSURE >= 90 MM HG: ICD-10-PCS | Mod: CPTII,S$GLB,, | Performed by: INTERNAL MEDICINE

## 2022-03-24 PROCEDURE — 1159F MED LIST DOCD IN RCRD: CPT | Mod: CPTII,S$GLB,, | Performed by: INTERNAL MEDICINE

## 2022-03-24 PROCEDURE — 3077F PR MOST RECENT SYSTOLIC BLOOD PRESSURE >= 140 MM HG: ICD-10-PCS | Mod: CPTII,S$GLB,, | Performed by: INTERNAL MEDICINE

## 2022-03-24 PROCEDURE — 3288F FALL RISK ASSESSMENT DOCD: CPT | Mod: CPTII,S$GLB,, | Performed by: INTERNAL MEDICINE

## 2022-03-24 PROCEDURE — 99214 OFFICE O/P EST MOD 30 MIN: CPT | Mod: S$GLB,,, | Performed by: INTERNAL MEDICINE

## 2022-03-24 PROCEDURE — 93005 ELECTROCARDIOGRAM TRACING: CPT | Mod: S$GLB,,, | Performed by: INTERNAL MEDICINE

## 2022-03-24 PROCEDURE — 93005 EKG 12-LEAD: ICD-10-PCS | Mod: S$GLB,,, | Performed by: INTERNAL MEDICINE

## 2022-03-24 PROCEDURE — 1159F PR MEDICATION LIST DOCUMENTED IN MEDICAL RECORD: ICD-10-PCS | Mod: CPTII,S$GLB,, | Performed by: INTERNAL MEDICINE

## 2022-03-24 PROCEDURE — 3077F SYST BP >= 140 MM HG: CPT | Mod: CPTII,S$GLB,, | Performed by: INTERNAL MEDICINE

## 2022-03-24 RX ORDER — FUROSEMIDE 20 MG/1
TABLET ORAL 2 TIMES DAILY
COMMUNITY
Start: 2022-01-27 | End: 2024-01-12

## 2022-03-24 RX ORDER — VALSARTAN 160 MG/1
TABLET ORAL 2 TIMES DAILY
COMMUNITY
Start: 2022-01-27

## 2022-03-24 NOTE — PROGRESS NOTES
Subjective:    Patient ID:  Tierra Taylor is a 76 y.o. female       Chief Complaint   Patient presents with    Follow-up     6 month        HPI:  Ms Tierra Taylor is a 76 y.o. female  is here for follow-up.  Patient has taken all her medications this morning she takes furosemide carvedilol and valsartan in spite of that her blood pressure is still elevated.  Her breathing is good denies any shortness of breath or difficulty in breathing at the present time however she does have significant dyspnea on exertion.  Patient denies any chest pain or tightness or dizziness or lightheadedness or loss of consciousness falls or head injury.        Review of patient's allergies indicates:   Allergen Reactions    Lisinopril Other (See Comments)     Cough    Penicillins     Tramadol      causes itching       Past Medical History:   Diagnosis Date    Arthritis     Hypertension     Sleep apnea      Past Surgical History:   Procedure Laterality Date    BREAST BIOPSY      CERVICAL DISCECTOMY      FATTY TUMOR      HYSTERECTOMY      KNEE SURGERY       Social History     Tobacco Use    Smoking status: Never Smoker    Smokeless tobacco: Never Used   Substance Use Topics    Alcohol use: No    Drug use: No     Family History   Problem Relation Age of Onset    Hypertension Mother     Hypertension Father         Review of Systems:   Constitution: Negative for diaphoresis and fever.   HEENT: Negative for nosebleeds.    Cardiovascular: Negative for chest pain       No dyspnea on exertion       No leg swelling        No palpitations  Respiratory: Negative for shortness of breath and wheezing.    Hematologic/Lymphatic: Negative for bleeding problem. Does not bruise/bleed easily.   Skin: Negative for color change and rash.   Musculoskeletal: Negative for falls and myalgias.   Gastrointestinal: Negative for hematemesis and hematochezia.   Genitourinary: Negative for hematuria.   Neurological: Negative for dizziness and  light-headedness.   Psychiatric/Behavioral: Negative for altered mental status and memory loss.          Objective:        Vitals:    03/24/22 1308   BP: (!) 190/122   Pulse: 73       Lab Results   Component Value Date    WBC 5.9 11/27/2020    HGB 12.8 11/27/2020    HCT 40.6 11/27/2020     11/27/2020    CHOL 129 11/27/2020    TRIG 81 11/27/2020    HDL 55 11/27/2020    ALT 18 11/30/2021    AST 19 11/30/2021     11/30/2021    K 3.7 11/30/2021     11/30/2021    CREATININE 0.72 11/30/2021    BUN 18 11/30/2021    CO2 34 (H) 11/30/2021    INR 1.01 04/28/2013    HGBA1C 5.4 11/30/2021    MICROALBUR 3.7 11/27/2020        ECHOCARDIOGRAM RESULTS  Results for orders placed during the hospital encounter of 04/01/21    Echo Color Flow Doppler? Yes    Interpretation Summary  · The left ventricle is normal in size with moderate concentric hypertrophy and normal systolic function. The estimated ejection fraction is 65%.  · Normal left ventricular diastolic function.  · Normal right ventricular size with normal right ventricular systolic function.  · Mild left atrial enlargement.        CURRENT/PREVIOUS VISIT EKG  Results for orders placed or performed in visit on 09/22/21   IN OFFICE EKG 12-LEAD (to Elsa)    Collection Time: 09/22/21  2:46 PM    Narrative    Test Reason : I10,    Vent. Rate : 078 BPM     Atrial Rate : 078 BPM     P-R Int : 184 ms          QRS Dur : 110 ms      QT Int : 418 ms       P-R-T Axes : 070 020 064 degrees     QTc Int : 476 ms    Normal sinus rhythm  Leftward axis  When compared with ECG of 15-MAR-2021 14:52,  No significant change was found  Confirmed by Elmer Hernandez MD ([id number]) on 10/1/2021 5:50:25 PM    Referred By:  UNIQUE           Confirmed By:Elmer Hernandez MD     No valid procedures specified.   Results for orders placed during the hospital encounter of 04/01/21    Nuclear Stress - Cardiology Interpreted    Interpretation Summary    Equivocal myocardial perfusion scan.    There is a  moderate sized, fixed defect consistent with scar in the anterior and apical wall(s).  No reversible focal perfusion defect.    There is a moderate intensity defect in the anteroapical wall of the left ventricle, secondary to breast attenuation.    There is a moderate intensity fixed defect in the  wall of the left ventricle, secondary to soft tissue attenuation.    The gated perfusion images showed an ejection fraction of 70% post stress. Normal ejection fraction is greater than 53%.    There is normal wall motion at rest and post stress.    LV cavity size is normal at rest and normal at stress.    The EKG portion of this study is negative for ischemia.    The patient reported no chest pain during the stress test.      Physical Exam:  CONSTITUTIONAL: No fever, no chills  HEENT: Normocephalic, atraumatic,pupils reactive to light                 NECK:  No JVD no carotid bruit  CVS: S1S2+, RRR, soft system murmurs,   LUNGS: Clear  ABDOMEN: Soft, NT, BS+  EXTREMITIES: No cyanosis, no edema  : No ferguson catheter  NEURO: AAO X 3  PSY: Normal affect      Medication List with Changes/Refills   Current Medications    ALENDRONATE (FOSAMAX) 70 MG TABLET    Take 1 tablet (70 mg total) by mouth every 7 days. For osteopenia    ALLOPURINOL (ZYLOPRIM) 100 MG TABLET    Take 1 tablet (100 mg total) by mouth once daily.    ASPIRIN 81 MG CHEW    Take 81 mg by mouth every evening.     BIMATOPROST (LUMIGAN) 0.01 % DROP    1 drop every evening.    CALCIUM-VITAMIN D 600 MG(1,500MG) -400 UNIT TAB    Take 1 tablet by mouth 2 (two) times a day.    CARVEDILOL (COREG) 12.5 MG TABLET    Take 1 tablet (12.5 mg total) by mouth 2 (two) times daily.    FUROSEMIDE (LASIX) 20 MG TABLET        ROSUVASTATIN (CRESTOR) 20 MG TABLET    Take 1 tablet (20 mg total) by mouth every evening.    VALSARTAN (DIOVAN) 160 MG TABLET       Discontinued Medications    AMLODIPINE (NORVASC) 5 MG TABLET    Take 1 tablet (5 mg total) by mouth once daily.     HYDROCHLOROTHIAZIDE (HYDRODIURIL) 25 MG TABLET    Take 1 tablet (25 mg total) by mouth every evening.    METRONIDAZOLE (FLAGYL) 500 MG TABLET    Take 1 tablet (500 mg total) by mouth every 12 (twelve) hours.    MOMETASONE (NASONEX) 50 MCG/ACTUATION NASAL SPRAY    2 sprays in each nostril    NAPROXEN SODIUM (ANAPROX) 220 MG TABLET    Take 220 mg by mouth 2 (two) times daily with meals.    TRAMADOL (ULTRAM) 50 MG TABLET    Take 50 mg by mouth every 6 (six) hours as needed for Pain.             Assessment:       1. Acute diastolic heart failure    2. Uncontrolled hypertension    3. Hypertensive heart disease with chronic diastolic congestive heart failure    4. Other secondary pulmonary hypertension    5. Essential hypertension    6. Mixed hyperlipidemia    7. Severe obesity    8. Primary osteoarthritis of left knee         Plan:   1. Patient is currently on valsartan 160 mg p.o. q.day and she is on carvedilol 12.5 mg p.o. b.i.d..  Today her current blood pressure is 190/122.  Will increase valsartan to 160 mg p.o. b.i.d. and see how she tolerates it.  And probably need to increase her carvedilol to 25 mg p.o. b.i.d..  This should bring her blood pressure down adequately.  Will start with will start an 1st and if it is still not adequately controlled will increase the carvedilol.  2. She is on furosemide her hydrochlorothiazide was stopped and she is doing well need to check her potassium and magnesium levels.  3. Reviewed the EKG independently patient is in normal sinus rhythm with heart rate of 70 beats per minute left axis deviation no acute ST T wave changes.  Poor R-wave progression in the anterior precordial leads.  4. Patient is currently on Crestor 20 mg p.o. q.h.s. continue the same and the primary physician is taking a cholesterol and liver function test.  5. Patient is on aspirin 81 mg p.o. q.day continue the same.  6. Patient is on allopurinol 100 mg p.o. q.day continue the same and the primary physician is  following her uric acid levels.  And she is also on Fosamax 70 mg every 7 days.  7. Patient to come into the office in see the nurse practitioner for her blood pressure to be checked and the medication to be readjusted  8. I will see her back in the office in 6 months time.            Problem List Items Addressed This Visit        Pulmonary    Other secondary pulmonary hypertension       Cardiac/Vascular    Hypertensive heart disease without heart failure    Essential hypertension    Mixed hyperlipidemia    Acute diastolic heart failure - Primary       Endocrine    Severe obesity       Orthopedic    Primary osteoarthritis of left knee      Other Visit Diagnoses     Uncontrolled hypertension              No follow-ups on file.

## 2022-03-28 NOTE — PRE ADMISSION SCREENING
Patient Name: Tierra Taylor  YOB: 1945   MRN: [id number]     [facility]   Basic Mobility Inpatient Short Form 6 Clicks         How much difficulty does the patient currently have  Unable  A Lot  A Little  None      1. Turning over in bed (including adjusting bedclothes, sheets and blankets)?     1 []    2 []    3 [x]    4 []        2. Sitting down on and standing up from a chair with arms (e.g., wheelchair, bedside commode, etc.)     1 []  2 []  3 [x]     4 []      3. Moving from lying on back to sitting on the side of the bed?     1 []  2 []  3 [x]    4 []    How much help from another person does the patient currently need  Total  A Lot  A Little  None      4. Moving to and from a bed to a chair (including a wheelchair)?    1 []  2 []  3 []    4 [x]      5. Need to walk in hospital room?    1 []  2 []  3 []    4 [x]      6. Climbing 3-5 steps with a railing?    1 []  2 []  3 []    4 [x]       Raw Score:    21              CMS 0-100% Score:     28.97       %   Standardized Score:     50.25          CMS Modifier:       CJ                                   [facility]   Basic Mobility Inpatient Short Form 6 Clicks Score Conversion Table*         *Use this form to convert -PAC Basic Mobility Inpatient Raw Scores.   [facility] Inpatient Basic Mobility Short Form Scoring Example   1. Add the number values associated with the response to each item. For example, items totals yield a Raw Score of 21.   2. Match the raw score to the t-Scale scores (t-Scale score = 50.25, SE = 4.69).   3. Find the associated CMS % (CMS % = 28.97%).   4. Locate the correct CMS Functional Modifier Code, or G Code (G code = CJ)     NOTE: Each -PAC Short Form has a separate conversion table. Make sure that you use the correct conversion table.       Instruction Manual - page 45 contains conversion table                   [facility] Risk Assessment Scale    Patient Name: Tierra Taylor  Date of  "Birth: 1945  MRN: [id number]            RIsk Factor Measure Recommendation Patient Data Scale/Score   BMI >40 Reconsider surgery, weight loss   Estimated body mass index is 39.14 kg/m² as calculated from the following:    Height as of 3/24/22: 5' 4" (1.626 m).    Weight as of 3/24/22: 103.4 kg (228 lb).   [] 0 = 1 - 24.9  [] 1 = 25-29.9  [] 2 = 30-34.9  [x] 3 = 35-39.9  [] 4 = 40-44.9  [] 5 = 45-99.9   Hemoglobin AIC (if applicable) >9 Delay surgery until DM under control  Refer for:  · Nutrition Therapy  · Exercise   · Medication    Lab Results   Component Value Date    HGBA1C 5.4 11/30/2021       Lab Results   Component Value Date     (H) 04/04/2022      [x] 0 = 4.0-5.6  [] 1 = 5.7-6.4  [] 2 = 6.5-6.9  [] 3 = 7.0-7.9  [] 4 = 8.0-8.9  [] 5 = 9.0-12   Hemoglobin (Anemia) <9 Delay surgery   Correct anemia Lab Results   Component Value Date    HGB 13.2 04/04/2022    [] 20 - <9.0                    Albumin <3 Delay surgery &Workup Lab Results   Component Value Date    ALBUMIN 3.8 04/04/2022    [] 20 - <3.0   Smoking Cessation >4 Weeks Delay Surgery  Refer to OP Cessation Class    NA [] 20 - current smoker                                _____ PPD                    Hx of MI, PE, Arrhythmia, CVA, DVT <30 Days Delay Surgery    NA [] 20      Infection Variable Delay surgery and re-evaluate   NA [] 20 - recent/current infection     Depression (PHQ) >10 out of 27 Delay Surgery and re-evaluate   Medication   Counseling              [x] 0     []1     []2     []3      []4      [] 5                    (1-4)      (5-9)  (10-14)  (15-19)   (20-27)     Memory Impairment & Memory loss (Mini-Cog Screening Tool) Advanced dementia and/or Parkinson's Reconsider surgery     [x] 0     []1     []2     []3     []4     [] 5     Physical Conditioning (Modified AM-PAC Per Physical Therapy at [facility]) Unable to ambulate on day of surgery Delay surgery and re-evaluate  Pre-Rehabilitation   (PT evaluation)       []  0   [x]4       " []8     []12        []16     []20       (<20%)   (<40%)   (<60%)   (<80% )    (>80%)     Home Environment/Caregiver support  (Per /Navigator Interview)    Availability of basic services and/or approprate assistance during post-operative period Delay surgery and re-evaluate   Safe home environment   Health   1 week post-surgery   Transportation  availability   Ability to obtain DME/Medications post-op    [] 0     [x]1     []2     []3     []4     [] 5  [x] 0     []1     []2     []3     []4     [] 5  [x] 0     []1     []2     []3     []4     [] 5  [x] 0     []1     []2     []3     []4     [] 5         MD Contact:  Comments:  Total Score:  8     JOINT CAMP ASSESSMENT    Name Tierra Taylor   MRN [id number]    Age/Sex 76 y.o. female    Surgeon Dr. Castro Finger   Joint Camp Date 4/4/22   Surgery Date    Procedure Right TKA   Insurance Payor: [email or web address] MEDICARE / Plan: [employer] MEDICARE HMO / Product Type: Capitation /    Care Team Patient Care Team:  Sofie Leonardo MD as PCP - General (Family Medicine)  Neville Romero Jr., MD as Consulting Physician (Orthopedic Surgery)    Pharmacy   [address] [address]  Phone: [phone] Fax: [phone]     AM-PAC Score   21   Risk Assessment Score 8     Past Medical History:   Diagnosis Date    Arthritis     Hypertension     Sleep apnea        Past Surgical History:   Procedure Laterality Date    BREAST BIOPSY      CERVICAL DISCECTOMY      FATTY TUMOR      HYSTERECTOMY      KNEE SURGERY           Home Enviroment     Living Arrangement: Lives with family - grandson stays with her at night - 30 yrs old  Home Environment: 1-story house/ trailer, number of inside stairs: 3 , tub only, walk-in shower  Home Safety Concerns: not applicable    DISCHARGE CAREGIVER/SUPPORT SYSTEM     Identified post-op caregiver: Patient has children / family / friends to help, patient, daughter  and son.  Patient's caregiver(s) will be able to provide physical assistance. Patient will have someone to assist overnight.      Caregiver present at pre-op interview:  No      PRE-OPERATIVE FUNCTIONAL STATUS     Employment: Unemployed    Pre-op Functional Status: Patient is independent with mobility/ambulation, transfers, ADL's, IADL's.    Use of assistive device for ambulation: straight cane  ADL: self care  ADL Limitations: difficulty with walking  Medical Restrictions: Unstable ambulation    POTENTIAL BARRIERS TO DISCHARGE/POTENTIAL POST-OP COMPLICATIONS   Heart failure, hypertension, sleep apnea  Video: watched at home        DISCHARGE PLAN     Expected LOS of 2 days or less for joint replacement discussed with patient. We also discussed a discharge path of HH for approximately two weeks with a transition to outpatient PT on the third week given no post-op complications.      Patient in agreement with discharge plan: Yes    Discharge to: Discharge home with home health (PT/OT) x2 weeks with transition to outpatient PT     HH:  [facility] Patient disclosure form completed and sent to case management for upload to the medical record.      OP PT: [facility] outpt PT     Home DME: rolling walker and single point cane    Needed DME at D/C: bedside commode     Rx: Per Dr. Marquez at discharge     Meds to Beds: N/A  Patient expected to discharge on Aspirin 81mg by mouth twice daily for DVT prophylaxis.

## 2022-03-31 ENCOUNTER — PATIENT MESSAGE (OUTPATIENT)
Dept: ORTHOPEDICS | Facility: CLINIC | Age: 77
End: 2022-03-31
Payer: MEDICARE

## 2022-04-01 ENCOUNTER — TELEPHONE (OUTPATIENT)
Dept: CARDIOLOGY | Facility: CLINIC | Age: 77
End: 2022-04-01
Payer: MEDICARE

## 2022-04-01 NOTE — LETTER
2022    Tierra Taylor  [address] [address]  [place] [address]       [facility]  [address] [facility] [address] [facility] [address]  [place] [address]  Phone: [phone]  Fax: [phone] Patient: Tierra Taylor  : 1945  Referring Doctor:Dr. ALVA  Right Total Knee Replacement 2022  Current Outpatient Medications   Medication Sig    alendronate (FOSAMAX) 70 MG tablet Take 1 tablet (70 mg total) by mouth every 7 days. For osteopenia    allopurinoL (ZYLOPRIM) 100 MG tablet Take 1 tablet (100 mg total) by mouth once daily.    aspirin 81 MG Chew Take 81 mg by mouth every evening.     bimatoprost (LUMIGAN) 0.01 % Drop 1 drop every evening.    calcium-vitamin D 600 mg(1,500mg) -400 unit Tab Take 1 tablet by mouth 2 (two) times a day.    carvediloL (COREG) 12.5 MG tablet Take 1 tablet (12.5 mg total) by mouth 2 (two) times daily.    furosemide (LASIX) 20 MG tablet     rosuvastatin (CRESTOR) 20 MG tablet Take 1 tablet (20 mg total) by mouth every evening.    valsartan (DIOVAN) 160 MG tablet      No current facility-administered medications for this visit.   This patient has been assessed for risk factors for clearance of surgery with the following stipulations:  ___ No contraindications  __x_ Recommendations for antiplatelet/anticoagulant medications:  Hold aspirin for 7 days prior to procedure peer  _x__ Cleared for surgery with the following contraindications/precautions:  Mild-to-moderate risk.    ___ Not cleared for surgery due to the following reasons:    If you have any questions regarding the above, please contact my office at [phone].    Sincerely,    ..

## 2022-04-01 NOTE — TELEPHONE ENCOUNTER
----- Message from Samara Collazo RN sent at 4/1/2022 11:01 AM CDT -----  Patient is to have a right total knee surgery with Dr. Marquez on 4/18/22.  She was seen in your office on 3/24.  Can she be cleared for surgery?    Thanks  Samara Pre-op

## 2022-04-04 ENCOUNTER — HOSPITAL ENCOUNTER (OUTPATIENT)
Dept: PREADMISSION TESTING | Facility: HOSPITAL | Age: 77
Discharge: HOME | End: 2022-04-04
Attending: ORTHOPAEDIC SURGERY
Payer: MEDICARE

## 2022-04-04 ENCOUNTER — HOSPITAL ENCOUNTER (OUTPATIENT)
Dept: RADIOLOGY | Facility: HOSPITAL | Age: 77
Discharge: HOME | End: 2022-04-04
Attending: ORTHOPAEDIC SURGERY
Payer: MEDICARE

## 2022-04-04 DIAGNOSIS — Z01.818 PRE-OPERATIVE EXAM: ICD-10-CM

## 2022-04-04 DIAGNOSIS — M17.11 PRIMARY OSTEOARTHRITIS OF RIGHT KNEE: ICD-10-CM

## 2022-04-04 LAB
ABO + RH BLD: NORMAL
ALBUMIN SERPL BCP-MCNC: 3.8 G/DL (ref 3.5–5.2)
ALP SERPL-CCNC: 69 U/L (ref 55–135)
ALT SERPL W/O P-5'-P-CCNC: 25 U/L (ref 10–44)
ANION GAP SERPL CALC-SCNC: 13 MMOL/L (ref 8–16)
AST SERPL-CCNC: 23 U/L (ref 10–40)
BASOPHILS # BLD AUTO: 0.04 K/UL (ref 0–0.2)
BASOPHILS NFR BLD: 0.7 % (ref 0–1.9)
BILIRUB SERPL-MCNC: 0.9 MG/DL (ref 0.1–1)
BLD GP AB SCN CELLS X3 SERPL QL: NORMAL
BUN SERPL-MCNC: 8 MG/DL (ref 8–23)
CALCIUM SERPL-MCNC: 9.5 MG/DL (ref 8.7–10.5)
CHLORIDE SERPL-SCNC: 102 MMOL/L (ref 95–110)
CO2 SERPL-SCNC: 27 MMOL/L (ref 23–29)
CREAT SERPL-MCNC: 0.8 MG/DL (ref 0.5–1.4)
DIFFERENTIAL METHOD: ABNORMAL
EOSINOPHIL # BLD AUTO: 0.2 K/UL (ref 0–0.5)
EOSINOPHIL NFR BLD: 3.7 % (ref 0–8)
ERYTHROCYTE [DISTWIDTH] IN BLOOD BY AUTOMATED COUNT: 13.1 % (ref 11.5–14.5)
EST. GFR  (AFRICAN AMERICAN): >60 ML/MIN/1.73 M^2
EST. GFR  (NON AFRICAN AMERICAN): >60 ML/MIN/1.73 M^2
GLUCOSE SERPL-MCNC: 121 MG/DL (ref 70–110)
HCT VFR BLD AUTO: 42.6 % (ref 37–48.5)
HGB BLD-MCNC: 13.2 G/DL (ref 12–16)
IMM GRANULOCYTES # BLD AUTO: 0.01 K/UL (ref 0–0.04)
IMM GRANULOCYTES NFR BLD AUTO: 0.2 % (ref 0–0.5)
LYMPHOCYTES # BLD AUTO: 2.3 K/UL (ref 1–4.8)
LYMPHOCYTES NFR BLD: 39 % (ref 18–48)
MCH RBC QN AUTO: 30.4 PG (ref 27–31)
MCHC RBC AUTO-ENTMCNC: 31 G/DL (ref 32–36)
MCV RBC AUTO: 98 FL (ref 82–98)
MONOCYTES # BLD AUTO: 0.5 K/UL (ref 0.3–1)
MONOCYTES NFR BLD: 8.9 % (ref 4–15)
NEUTROPHILS # BLD AUTO: 2.9 K/UL (ref 1.8–7.7)
NEUTROPHILS NFR BLD: 47.5 % (ref 38–73)
NRBC BLD-RTO: 0 /100 WBC
PLATELET # BLD AUTO: 228 K/UL (ref 150–450)
PMV BLD AUTO: 11 FL (ref 9.2–12.9)
POTASSIUM SERPL-SCNC: 3.8 MMOL/L (ref 3.5–5.1)
PROT SERPL-MCNC: 8 G/DL (ref 6–8.4)
RBC # BLD AUTO: 4.34 M/UL (ref 4–5.4)
SODIUM SERPL-SCNC: 142 MMOL/L (ref 136–145)
WBC # BLD AUTO: 5.98 K/UL (ref 3.9–12.7)

## 2022-04-04 PROCEDURE — 71046 XR CHEST PA AND LATERAL: ICD-10-PCS | Mod: 26,,, | Performed by: RADIOLOGY

## 2022-04-04 PROCEDURE — 73700 CT KNEE WITHOUT CONTRAST RIGHT W/MAKO PROTOCOL: ICD-10-PCS | Mod: 26,RT,, | Performed by: RADIOLOGY

## 2022-04-04 PROCEDURE — 73700 CT LOWER EXTREMITY W/O DYE: CPT | Mod: 26,RT,, | Performed by: RADIOLOGY

## 2022-04-04 PROCEDURE — 86901 BLOOD TYPING SEROLOGIC RH(D): CPT | Performed by: ORTHOPAEDIC SURGERY

## 2022-04-04 PROCEDURE — 99900103 DSU ONLY-NO CHARGE-INITIAL HR (STAT)

## 2022-04-04 PROCEDURE — 87081 CULTURE SCREEN ONLY: CPT | Performed by: ORTHOPAEDIC SURGERY

## 2022-04-04 PROCEDURE — 73700 CT LOWER EXTREMITY W/O DYE: CPT | Mod: TC,RT

## 2022-04-04 PROCEDURE — 80053 COMPREHEN METABOLIC PANEL: CPT | Performed by: ORTHOPAEDIC SURGERY

## 2022-04-04 PROCEDURE — 71046 X-RAY EXAM CHEST 2 VIEWS: CPT | Mod: TC,FY

## 2022-04-04 PROCEDURE — 36415 COLL VENOUS BLD VENIPUNCTURE: CPT | Performed by: ORTHOPAEDIC SURGERY

## 2022-04-04 PROCEDURE — 71046 X-RAY EXAM CHEST 2 VIEWS: CPT | Mod: 26,,, | Performed by: RADIOLOGY

## 2022-04-04 PROCEDURE — 99900104 DSU ONLY-NO CHARGE-EA ADD'L HR (STAT)

## 2022-04-04 PROCEDURE — 85025 COMPLETE CBC W/AUTO DIFF WBC: CPT | Performed by: ORTHOPAEDIC SURGERY

## 2022-04-04 NOTE — DISCHARGE INSTRUCTIONS
To confirm, Your doctor has instructed you that surgery is scheduled for: 4/18/22 with Dr. Marquez    Please report to [facility], Registration the morning of surgery. You must check-in and receive a wristband before going to your procedure.    Pre-Op will call the afternoon prior to surgery between 1:00 and 6:00 PM with the final arrival time.  Phone number: [phone]    PLEASE NOTE:  The surgery schedule has many variables which may affect the time of your surgery case.  Family members should be available if your surgery time changes.  Plan to be here the day of your procedure between 4-6 hours.    MEDICATIONS:  TAKE ONLY THESE MEDICATIONS WITH A SMALL SIP OF WATER THE MORNING OF YOUR PROCEDURE:  CARVEDILOL, ALLOPURINOL    NO VALSARTAN OR FUROSEMIDE MORNING OF SURGERY BUT DO NOT STOP DAYS BEFORE.      DO NOT TAKE THESE MEDICATIONS 5-7 DAYS PRIOR to your procedure or per your surgeon's request:   ASPIRIN, ALEVE, ADVIL, IBUPROFEN, FISH OIL VITAMIN E, HERBALS  (May take Tylenol)    ONLY if you are prescribed any types of blood thinners such as:  Aspirin, Coumadin, Plavix, Pradaxa, Xarelto, Aggrenox, Effient, Eliquis, Savasya, Brilinta, or any other, ask your surgeon whether you should stop taking them and how long before surgery you should stop.  You may also need to verify with the prescribing physician if it is ok to stop your medication.      INSTRUCTIONS IMPORTANT!!  Do not eat or drink anything between midnight and the time of your procedure- this includes gum, mints, and candy.  Do not smoke or drink alcoholic beverages 24 hours prior to your procedure.  Shower the night before AND the morning of your procedure with a Chlorhexidine wash such as Hibiclens or Dial antibacterial soap from the neck down.  Do not get it on your face or in your eyes.  You may use your own shampoo and face wash. This helps your skin to be as bacteria free as possible.    If you wear contact lenses, dentures,  hearing aids or glasses, bring a container to put them in during surgery and give to a family member for safe keeping.  Please leave all jewelry, piercing's and valuables at home.   DO NOT remove hair from the surgery site.  Do not shave the incision site unless you are given specific instructions to do so.    ONLY if you have been diagnosed with sleep apnea please bring your C-PAP machine.  ONLY if you wear home oxygen please bring your portable oxygen tank the day of your procedure.  ONLY if you have a history of OPEN HEART SURGERY you will need a clearance from your Cardiologist per Anesthesia.      ONLY for patients requiring bowel prep, written instructions will be given by your doctor's office.  ONLY if you have a neuro stimulator, please bring the controller with you the morning of surgery  ONLY if a type and screen test is needed before surgery, please return: DONE TODAY  If your doctor has scheduled you for an overnight stay, bring a small overnight bag with any personal items you need.  Make arrangements in advance for transportation home by a responsible adult.  It is not safe to drive a vehicle during the 24 hours after anesthesia.       [facility] will resume routine visitation for COVID-19 negative patients, including inpatients, outpatients, and  procedural areas. Visitors are still required to practice social distancing in waiting rooms, cafeterias, and common  areas. Visitors and patients should maintain six feet distance between those not in their group. Visitors will be  asked to leave if they exhibit symptoms of respiratory infection, have tested positive for COVID -19 in the last  ten days, have a pending COVID-19 test for symptoms, are unable or refuse to wear a mask OR do not comply  with current policy.       All [facility] facilities and properties are tobacco free.  Smoking is NOT allowed.   If you have any questions about these instructions, call Pre-Op Admit  Nursing at [phone] or the  Pre-Op Day Surgery Unit at [phone].

## 2022-04-05 DIAGNOSIS — M17.11 PRIMARY OSTEOARTHRITIS OF RIGHT KNEE: Primary | ICD-10-CM

## 2022-04-06 LAB — MRSA SPEC QL CULT: NORMAL

## 2022-04-07 ENCOUNTER — CLINICAL SUPPORT (OUTPATIENT)
Dept: CARDIOLOGY | Facility: CLINIC | Age: 77
End: 2022-04-07
Payer: MEDICARE

## 2022-04-07 VITALS — HEART RATE: 64 BPM | OXYGEN SATURATION: 97 % | DIASTOLIC BLOOD PRESSURE: 82 MMHG | SYSTOLIC BLOOD PRESSURE: 136 MMHG

## 2022-04-07 NOTE — PROGRESS NOTES
Pt bp was good she said she feels the increased dose is working. I instructed her to continue as she is doing.    .

## 2022-04-12 ENCOUNTER — TELEPHONE (OUTPATIENT)
Dept: ORTHOPEDICS | Facility: CLINIC | Age: 77
End: 2022-04-12

## 2022-04-12 DIAGNOSIS — Z96.651 STATUS POST TOTAL RIGHT KNEE REPLACEMENT: Primary | ICD-10-CM

## 2022-04-13 ENCOUNTER — TELEPHONE (OUTPATIENT)
Dept: ORTHOPEDICS | Facility: CLINIC | Age: 77
End: 2022-04-13

## 2022-04-13 NOTE — TELEPHONE ENCOUNTER
----- Message from Earlene Terry sent at 4/13/2022  2:01 PM CDT -----  Danny at [phone] is  calling  concerning  the  pt  above about  her  surgery  being  cancelled that's  on  Monday 4-18-22

## 2022-06-20 DIAGNOSIS — Z12.31 ENCOUNTER FOR SCREENING MAMMOGRAM FOR BREAST CANCER: Primary | ICD-10-CM

## 2022-06-21 ENCOUNTER — HOSPITAL ENCOUNTER (OUTPATIENT)
Dept: RADIOLOGY | Facility: HOSPITAL | Age: 77
Discharge: HOME | End: 2022-06-21
Attending: INTERNAL MEDICINE
Payer: MEDICARE

## 2022-06-21 VITALS — HEIGHT: 64 IN | WEIGHT: 227.94 LBS | BODY MASS INDEX: 38.91 KG/M2

## 2022-06-21 DIAGNOSIS — Z12.31 ENCOUNTER FOR SCREENING MAMMOGRAM FOR BREAST CANCER: ICD-10-CM

## 2022-06-21 PROCEDURE — 77063 BREAST TOMOSYNTHESIS BI: CPT | Mod: TC,PO

## 2022-06-21 PROCEDURE — 77067 SCR MAMMO BI INCL CAD: CPT | Mod: TC,PO

## 2022-07-28 ENCOUNTER — OFFICE VISIT (OUTPATIENT)
Dept: PAIN MEDICINE | Facility: CLINIC | Age: 77
End: 2022-07-28
Payer: MEDICARE

## 2022-07-28 VITALS
HEART RATE: 68 BPM | HEIGHT: 64 IN | DIASTOLIC BLOOD PRESSURE: 76 MMHG | SYSTOLIC BLOOD PRESSURE: 144 MMHG | BODY MASS INDEX: 38.76 KG/M2 | WEIGHT: 227 LBS

## 2022-07-28 DIAGNOSIS — M17.11 PRIMARY OSTEOARTHRITIS OF RIGHT KNEE: ICD-10-CM

## 2022-07-28 DIAGNOSIS — M17.12 PRIMARY OSTEOARTHRITIS OF LEFT KNEE: ICD-10-CM

## 2022-07-28 DIAGNOSIS — M51.36 DDD (DEGENERATIVE DISC DISEASE), LUMBAR: ICD-10-CM

## 2022-07-28 DIAGNOSIS — M47.896 OTHER SPONDYLOSIS, LUMBAR REGION: Primary | ICD-10-CM

## 2022-07-28 PROCEDURE — 3077F SYST BP >= 140 MM HG: CPT | Mod: CPTII,S$GLB,, | Performed by: ANESTHESIOLOGY

## 2022-07-28 PROCEDURE — 1125F PR PAIN SEVERITY QUANTIFIED, PAIN PRESENT: ICD-10-PCS | Mod: CPTII,S$GLB,, | Performed by: ANESTHESIOLOGY

## 2022-07-28 PROCEDURE — 1101F PT FALLS ASSESS-DOCD LE1/YR: CPT | Mod: CPTII,S$GLB,, | Performed by: ANESTHESIOLOGY

## 2022-07-28 PROCEDURE — 3288F PR FALLS RISK ASSESSMENT DOCUMENTED: ICD-10-PCS | Mod: CPTII,S$GLB,, | Performed by: ANESTHESIOLOGY

## 2022-07-28 PROCEDURE — 1159F MED LIST DOCD IN RCRD: CPT | Mod: CPTII,S$GLB,, | Performed by: ANESTHESIOLOGY

## 2022-07-28 PROCEDURE — 1125F AMNT PAIN NOTED PAIN PRSNT: CPT | Mod: CPTII,S$GLB,, | Performed by: ANESTHESIOLOGY

## 2022-07-28 PROCEDURE — 3288F FALL RISK ASSESSMENT DOCD: CPT | Mod: CPTII,S$GLB,, | Performed by: ANESTHESIOLOGY

## 2022-07-28 PROCEDURE — 99204 OFFICE O/P NEW MOD 45 MIN: CPT | Mod: S$GLB,,, | Performed by: ANESTHESIOLOGY

## 2022-07-28 PROCEDURE — 1159F PR MEDICATION LIST DOCUMENTED IN MEDICAL RECORD: ICD-10-PCS | Mod: CPTII,S$GLB,, | Performed by: ANESTHESIOLOGY

## 2022-07-28 PROCEDURE — 99999 PR PBB SHADOW E&M-EST. PATIENT-LVL III: ICD-10-PCS | Mod: PBBFAC,,, | Performed by: ANESTHESIOLOGY

## 2022-07-28 PROCEDURE — 99204 PR OFFICE/OUTPT VISIT, NEW, LEVL IV, 45-59 MIN: ICD-10-PCS | Mod: S$GLB,,, | Performed by: ANESTHESIOLOGY

## 2022-07-28 PROCEDURE — 3078F PR MOST RECENT DIASTOLIC BLOOD PRESSURE < 80 MM HG: ICD-10-PCS | Mod: CPTII,S$GLB,, | Performed by: ANESTHESIOLOGY

## 2022-07-28 PROCEDURE — 1101F PR PT FALLS ASSESS DOC 0-1 FALLS W/OUT INJ PAST YR: ICD-10-PCS | Mod: CPTII,S$GLB,, | Performed by: ANESTHESIOLOGY

## 2022-07-28 PROCEDURE — 3078F DIAST BP <80 MM HG: CPT | Mod: CPTII,S$GLB,, | Performed by: ANESTHESIOLOGY

## 2022-07-28 PROCEDURE — 3077F PR MOST RECENT SYSTOLIC BLOOD PRESSURE >= 140 MM HG: ICD-10-PCS | Mod: CPTII,S$GLB,, | Performed by: ANESTHESIOLOGY

## 2022-07-28 PROCEDURE — 99999 PR PBB SHADOW E&M-EST. PATIENT-LVL III: CPT | Mod: PBBFAC,,, | Performed by: ANESTHESIOLOGY

## 2022-07-28 NOTE — PROGRESS NOTES
This note was completed with dictation software and grammatical errors may exist.    Referring Physician:     PCP: Lincoln Castro MD      CC: low back pain, knee pain    HPI:   Tierra Taylor is a 76 y.o. female who presents to lower back pain and bilateral knee pain.  She has history of osteoarthritis of the lumbar spine as well as bilateral knee osteoarthritis.  She is currently follow-up orthopedics in his consider a right knee replacement.  Patient presents with constant aching, burning, shooting pain in lower back.  Pain radiates to her right hip at times.  She also has constant pain in her bilateral knees, right greater than left.  Pain worsens standing, bending, walking getting up.  Pain improves with rest.  She states having updated imaging.  She has tried physical therapy with minimal benefit.  She denies any worsening weakness.  No bowel bladder changes.    ROS:  CONSTITUTIONAL: No fevers, chills, night sweats, wt. loss, appetite changes  SKIN: no rashes or itching  ENT: No headaches, head trauma, vision changes, or eye pain  LYMPH NODES: None noticed   CV: No chest pain, palpitations.   RESP: No shortness of breath, dyspnea on exertion, cough, wheezing, or hemoptysis  GI: No nausea, emesis, diarrhea, constipation, melena, hematochezia, pain.    : No dysuria, hematuria, urgency, or frequency   HEME: No easy bruising, bleeding problems  PSYCHIATRIC: No depression, anxiety, psychosis, hallucinations.  NEURO: No seizures, memory loss, dizziness or difficulty sleeping  MSK:  Positive HPI    Past Medical History:   Diagnosis Date    Arthritis     Hypertension     Sleep apnea     doesn't wear CPAP     Past Surgical History:   Procedure Laterality Date    BREAST BIOPSY      CERVICAL DISCECTOMY      FATTY TUMOR      HYSTERECTOMY      KNEE SURGERY       Family History   Problem Relation Age of Onset    Hypertension Mother     Hypertension Father     Breast cancer Sister      Social History  "    Socioeconomic History    Marital status: Single   Tobacco Use    Smoking status: Never Smoker    Smokeless tobacco: Never Used   Substance and Sexual Activity    Alcohol use: No    Drug use: No         Medications/Allergies: See med card    Vitals:    07/28/22 0933   BP: (!) 144/76   Pulse: 68   Weight: 103 kg (227 lb)   Height: 5' 4" (1.626 m)   PainSc:   5   PainLoc: Back         Physical exam:    GENERAL: A and O x3, the patient appears well groomed and is in no acute distress.  Skin: No rashes or obvious lesions  HEENT: normocephalic, atraumatic  CARDIOVASCULAR:  Palpable peripheral pulses  LUNGS: easy work of breathing  ABDOMEN: soft, nontender   UPPER EXTREMITIES: Normal alignment, normal range of motion, no atrophy, no skin changes,  hair growth and nail growth normal and equal bilaterally. No swelling, no tenderness.    LOWER EXTREMITIES:  Normal alignment, normal range of motion, no atrophy, no skin changes,  hair growth and nail growth normal and equal bilaterally. No swelling, no tenderness. +bilateral knee crepitus  LUMBAR SPINE  Lumbar spine: ROM is very limited with flexion extension and oblique extension with moderate increased pain.    Antelmo's test causes no increased pain on either side.    Supine straight leg raise is negative bilaterally.    Internal and external rotation of the hip causes no increased pain on either side.  Myofascial exam: No tenderness to palpation across lumbar paraspinous muscles.      MENTAL STATUS: normal orientation, speech, language, and fund of knowledge for social situation.  Emotional state appropriate.    MOTOR: Tone and bulk: normal bilateral upper and lower Strength: normal   SENSATION: Light touch and pinprick intact bilaterally  REFLEXES: normal, symmetric, nonbrisk.  Toes down, no clonus. No hoffmans.  GAIT: uses cane for assistance      Imaging:  Xray Knee 2017  IMPRESSION:  Medial compartment predominant osteoarthrosis of the " knee.    Assessment:  Patient presents with lower back pain, knee pain.  1. Other spondylosis, lumbar region    2. DDD (degenerative disc disease), lumbar    3. Primary osteoarthritis of right knee    4. Primary osteoarthritis of left knee        Plan:  1. I have stressed the importance of physical activity and exercise to improve overall health  2. Reviewed pertinent imaging and records with patient  3. I believe her low back pain maybe due to facet arthropathy and have recommended lumbar medial branch blocks as a diagnostic procedure.  If successful, would proceed with radiofrequency ablation.  She will consider this option and contact us if she wishes to proceed  4. May also consider genicular blocks to help with her knee  5. Follow up as needed    Thank you for referring this interesting patient, and I look forward to continuing to collaborate in her care.

## 2023-05-15 NOTE — TELEPHONE ENCOUNTER
----- Message from Sofie Leonardo MD sent at 6/23/2020  3:45 PM CDT -----  Please call the patient regarding her abnormal mammogram w/ right breast u/s result.    1. Scattered fibroglandular densities.  There are nodular asymmetries in the right breast retroareolar region laterally on the nipple in profile view, unchanged from prior mammograms  2. Probably benign exam.  Focally dilated ducts containing echogenic debris in the right breast retroareolar region have stable mammographic appearance over multiple prior exams.  A follow-up right breast diagnostic ultrasound in 6 months is recommended.    ##Recommend RIGHT breast U/S in 6 months.   Sotyktu Pregnancy And Lactation Text: There is insufficient data to evaluate whether or not Sotyktu is safe to use during pregnancy.? ?It is not known if Sotyktu passes into breast milk and whether or not it is safe to use when breastfeeding.??

## 2023-06-05 ENCOUNTER — OFFICE VISIT (OUTPATIENT)
Dept: CARDIOLOGY | Facility: CLINIC | Age: 78
End: 2023-06-05
Payer: MEDICARE

## 2023-06-05 VITALS
HEIGHT: 64 IN | RESPIRATION RATE: 16 BRPM | WEIGHT: 217 LBS | OXYGEN SATURATION: 98 % | BODY MASS INDEX: 37.05 KG/M2 | DIASTOLIC BLOOD PRESSURE: 82 MMHG | SYSTOLIC BLOOD PRESSURE: 140 MMHG | HEART RATE: 75 BPM

## 2023-06-05 DIAGNOSIS — I10 ESSENTIAL HYPERTENSION: ICD-10-CM

## 2023-06-05 DIAGNOSIS — R94.39 ABNORMAL NUCLEAR STRESS TEST: ICD-10-CM

## 2023-06-05 DIAGNOSIS — R94.31 NONSPECIFIC ABNORMAL ELECTROCARDIOGRAM (ECG) (EKG): ICD-10-CM

## 2023-06-05 DIAGNOSIS — E66.01 SEVERE OBESITY: ICD-10-CM

## 2023-06-05 DIAGNOSIS — I50.31 ACUTE DIASTOLIC HEART FAILURE: Primary | ICD-10-CM

## 2023-06-05 DIAGNOSIS — I27.29 OTHER SECONDARY PULMONARY HYPERTENSION: ICD-10-CM

## 2023-06-05 DIAGNOSIS — I10 UNCONTROLLED HYPERTENSION: ICD-10-CM

## 2023-06-05 DIAGNOSIS — R06.09 DYSPNEA ON EXERTION: ICD-10-CM

## 2023-06-05 DIAGNOSIS — G47.33 OBSTRUCTIVE SLEEP APNEA: ICD-10-CM

## 2023-06-05 DIAGNOSIS — M17.12 PRIMARY OSTEOARTHRITIS OF LEFT KNEE: ICD-10-CM

## 2023-06-05 PROCEDURE — 1159F PR MEDICATION LIST DOCUMENTED IN MEDICAL RECORD: ICD-10-PCS | Mod: CPTII,S$GLB,, | Performed by: INTERNAL MEDICINE

## 2023-06-05 PROCEDURE — 93000 EKG 12-LEAD: ICD-10-PCS | Mod: S$GLB,,, | Performed by: INTERNAL MEDICINE

## 2023-06-05 PROCEDURE — 3077F SYST BP >= 140 MM HG: CPT | Mod: CPTII,S$GLB,, | Performed by: INTERNAL MEDICINE

## 2023-06-05 PROCEDURE — 1159F MED LIST DOCD IN RCRD: CPT | Mod: CPTII,S$GLB,, | Performed by: INTERNAL MEDICINE

## 2023-06-05 PROCEDURE — 99214 OFFICE O/P EST MOD 30 MIN: CPT | Mod: S$GLB,,, | Performed by: INTERNAL MEDICINE

## 2023-06-05 PROCEDURE — 1126F AMNT PAIN NOTED NONE PRSNT: CPT | Mod: CPTII,S$GLB,, | Performed by: INTERNAL MEDICINE

## 2023-06-05 PROCEDURE — 3288F FALL RISK ASSESSMENT DOCD: CPT | Mod: CPTII,S$GLB,, | Performed by: INTERNAL MEDICINE

## 2023-06-05 PROCEDURE — 99214 PR OFFICE/OUTPT VISIT, EST, LEVL IV, 30-39 MIN: ICD-10-PCS | Mod: S$GLB,,, | Performed by: INTERNAL MEDICINE

## 2023-06-05 PROCEDURE — 99999 PR PBB SHADOW E&M-EST. PATIENT-LVL III: CPT | Mod: PBBFAC,,, | Performed by: INTERNAL MEDICINE

## 2023-06-05 PROCEDURE — 1160F RVW MEDS BY RX/DR IN RCRD: CPT | Mod: CPTII,S$GLB,, | Performed by: INTERNAL MEDICINE

## 2023-06-05 PROCEDURE — 1101F PT FALLS ASSESS-DOCD LE1/YR: CPT | Mod: CPTII,S$GLB,, | Performed by: INTERNAL MEDICINE

## 2023-06-05 PROCEDURE — 1126F PR PAIN SEVERITY QUANTIFIED, NO PAIN PRESENT: ICD-10-PCS | Mod: CPTII,S$GLB,, | Performed by: INTERNAL MEDICINE

## 2023-06-05 PROCEDURE — 1160F PR REVIEW ALL MEDS BY PRESCRIBER/CLIN PHARMACIST DOCUMENTED: ICD-10-PCS | Mod: CPTII,S$GLB,, | Performed by: INTERNAL MEDICINE

## 2023-06-05 PROCEDURE — 1101F PR PT FALLS ASSESS DOC 0-1 FALLS W/OUT INJ PAST YR: ICD-10-PCS | Mod: CPTII,S$GLB,, | Performed by: INTERNAL MEDICINE

## 2023-06-05 PROCEDURE — 99999 PR PBB SHADOW E&M-EST. PATIENT-LVL III: ICD-10-PCS | Mod: PBBFAC,,, | Performed by: INTERNAL MEDICINE

## 2023-06-05 PROCEDURE — 3077F PR MOST RECENT SYSTOLIC BLOOD PRESSURE >= 140 MM HG: ICD-10-PCS | Mod: CPTII,S$GLB,, | Performed by: INTERNAL MEDICINE

## 2023-06-05 PROCEDURE — 93000 ELECTROCARDIOGRAM COMPLETE: CPT | Mod: S$GLB,,, | Performed by: INTERNAL MEDICINE

## 2023-06-05 PROCEDURE — 3288F PR FALLS RISK ASSESSMENT DOCUMENTED: ICD-10-PCS | Mod: CPTII,S$GLB,, | Performed by: INTERNAL MEDICINE

## 2023-06-05 PROCEDURE — 3079F DIAST BP 80-89 MM HG: CPT | Mod: CPTII,S$GLB,, | Performed by: INTERNAL MEDICINE

## 2023-06-05 PROCEDURE — 3079F PR MOST RECENT DIASTOLIC BLOOD PRESSURE 80-89 MM HG: ICD-10-PCS | Mod: CPTII,S$GLB,, | Performed by: INTERNAL MEDICINE

## 2023-06-05 RX ORDER — HYDRALAZINE HYDROCHLORIDE 25 MG/1
25 TABLET, FILM COATED ORAL
COMMUNITY
Start: 2023-05-16

## 2023-06-05 NOTE — PROGRESS NOTES
Subjective:    Patient ID:  Tierra Taylor is a 77 y.o. female     Chief Complaint   Patient presents with    Hyperlipidemia    Hypertension       HPI:  Ms Tierra Taylor is a 77 y.o. female is here for follow-up.    Patient has been doing well except that she does still has some dyspnea on exertion.  And she is unable to exercise much because of severe arthritis in both the knees.  She denies any chest pain or tightness or heaviness denies any dizziness or lightheadedness denies any loss of consciousness falls or head injury.    She is taking all her medications regularly.  Hydralazine was recently added 1 and she is been taking regularly.    Review of patient's allergies indicates:   Allergen Reactions    Lisinopril Other (See Comments)     Cough    Penicillins      swelling    Tramadol      causes itching       Past Medical History:   Diagnosis Date    Arthritis     Hypertension     Sleep apnea     doesn't wear CPAP     Past Surgical History:   Procedure Laterality Date    BREAST BIOPSY      CERVICAL DISCECTOMY      FATTY TUMOR      HYSTERECTOMY      KNEE SURGERY       Social History     Tobacco Use    Smoking status: Never    Smokeless tobacco: Never   Substance Use Topics    Alcohol use: No    Drug use: No     Family History   Problem Relation Age of Onset    Hypertension Mother     Hypertension Father     Breast cancer Sister         Review of Systems:   Constitution: Negative for diaphoresis and fever.   HEENT: Negative for nosebleeds.    Cardiovascular: Negative for chest pain       Intermittent dyspnea on exertion       No leg swelling        No palpitations  Respiratory: Negative for shortness of breath and wheezing.    Hematologic/Lymphatic: Negative for bleeding problem. Does not bruise/bleed easily.   Skin: Negative for color change and rash.   Musculoskeletal: Negative for falls and myalgias.   Gastrointestinal: Negative for hematemesis and hematochezia.   Genitourinary: Negative for hematuria.    Neurological: Negative for dizziness and light-headedness.   Psychiatric/Behavioral: Negative for altered mental status and memory loss.          Objective:        Vitals:    06/05/23 1433   BP: (!) 140/82   Pulse: 75   Resp: 16       Lab Results   Component Value Date    WBC 5.98 04/04/2022    HGB 13.2 04/04/2022    HCT 42.6 04/04/2022     04/04/2022    CHOL 129 11/27/2020    TRIG 81 11/27/2020    HDL 55 11/27/2020    ALT 25 04/04/2022    AST 23 04/04/2022     04/04/2022    K 3.8 04/04/2022     04/04/2022    CREATININE 0.8 04/04/2022    BUN 8 04/04/2022    CO2 27 04/04/2022    INR 1.01 04/28/2013    HGBA1C 5.4 11/30/2021    MICROALBUR 3.7 11/27/2020        ECHOCARDIOGRAM RESULTS  Results for orders placed during the hospital encounter of 04/01/21    Echo Color Flow Doppler? Yes    Interpretation Summary  · The left ventricle is normal in size with moderate concentric hypertrophy and normal systolic function. The estimated ejection fraction is 65%.  · Normal left ventricular diastolic function.  · Normal right ventricular size with normal right ventricular systolic function.  · Mild left atrial enlargement.        CURRENT/PREVIOUS VISIT EKG  Results for orders placed or performed in visit on 03/24/22   IN OFFICE EKG 12-LEAD (to [place])    Collection Time: 03/24/22  1:15 PM    Narrative    Test Reason : I11.9,    Vent. Rate : 070 BPM     Atrial Rate : 070 BPM     P-R Int : 174 ms          QRS Dur : 098 ms      QT Int : 426 ms       P-R-T Axes : 062 -33 049 degrees     QTc Int : 460 ms    Normal sinus rhythm  Left axis deviation  Abnormal ECG  When compared with ECG of 22-SEP-2021 14:46,  No significant change was found  Confirmed by Karolina MACIEL, Jorden GOMES ([id number]) on 4/17/2022 5:25:26 PM    Referred By: [id number]   SELF           Confirmed By:Jorden Turcios MD     No valid procedures specified.   Results for orders placed during the hospital encounter of 04/01/21    Nuclear Stress -  Cardiology Interpreted    Interpretation Summary    Equivocal myocardial perfusion scan.    There is a moderate sized, fixed defect consistent with scar in the anterior and apical wall(s).  No reversible focal perfusion defect.    There is a moderate intensity defect in the anteroapical wall of the left ventricle, secondary to breast attenuation.    There is a moderate intensity fixed defect in the  wall of the left ventricle, secondary to soft tissue attenuation.    The gated perfusion images showed an ejection fraction of 70% post stress. Normal ejection fraction is greater than 53%.    There is normal wall motion at rest and post stress.    LV cavity size is normal at rest and normal at stress.    The EKG portion of this study is negative for ischemia.    The patient reported no chest pain during the stress test.      Physical Exam:  CONSTITUTIONAL: No fever, no chills  HEENT: Normocephalic, atraumatic,pupils reactive to light                 NECK:  No JVD no carotid bruit  CVS: S1S2+, RRR, systolic murmurs,   LUNGS: Clear  ABDOMEN: Soft, NT, BS+  EXTREMITIES: No cyanosis, edema  : No ferguson catheter  NEURO: AAO X 3  PSY: Normal affect      Medication List with Changes/Refills   Current Medications    ALLOPURINOL (ZYLOPRIM) 100 MG TABLET    Take 1 tablet (100 mg total) by mouth once daily.    ASPIRIN 81 MG CHEW    Take 81 mg by mouth every evening.     BIMATOPROST (LUMIGAN) 0.01 % DROP    1 drop every evening.    CALCIUM-VITAMIN D 600 MG(1,500MG) -400 UNIT TAB    Take 1 tablet by mouth 2 (two) times a day.    CARVEDILOL (COREG) 12.5 MG TABLET    Take 1 tablet (12.5 mg total) by mouth 2 (two) times daily.    FUROSEMIDE (LASIX) 20 MG TABLET    Take by mouth 2 (two) times a day.    HYDRALAZINE (APRESOLINE) 25 MG TABLET    Take 25 mg by mouth.    ROSUVASTATIN (CRESTOR) 20 MG TABLET    Take 1 tablet (20 mg total) by mouth every evening.    VALSARTAN (DIOVAN) 160 MG TABLET    Take by mouth 2 (two) times daily.    Discontinued Medications    ALENDRONATE (FOSAMAX) 70 MG TABLET    Take 1 tablet (70 mg total) by mouth every 7 days. For osteopenia             Assessment:       1. Abnormal nuclear stress test    2. Acute diastolic heart failure    3. Uncontrolled hypertension    4. Other secondary pulmonary hypertension    5. Essential hypertension    6. Severe obesity    7. Primary osteoarthritis of left knee    8. Obstructive sleep apnea    9. Nonspecific abnormal electrocardiogram (ECG) (EKG)    10. Dyspnea on exertion         Plan:   1. Acute diastolic heart failure   Patient is doing well at the present time she is currently on furosemide 20 mg twice daily and her last recent blood work showed her potassium at 3.8    Some of the dyspnea on exertion is also associated with it.    2. Essential hypertension/uncontrolled hypertension.    Her blood pressure is 140 over 82 and she is on valsartan 160 mg p.o. b.i.d. continue the same carvedilol 12.5 mg p.o. b.i.d. continue the furosemide 20 mg p.o. Q b.i.d. continue same and hydralazine 25 mg p.o. b.i.d..  Patient to check her blood pressure at home 3 to 4 times a day for 1 week.  3. Mixed hyperlipidemia   She is currently on rosuvastatin 20 mg p.o. q.h.s. continue the same her total cholesterol is 129 HDL is 55 LDL is 58 and triglycerides 81.  She follows up with the primary physician.  And her liver function tests are within normal limits.  4. Primary osteoarthritis of the knees   She has significant arthritis which prevents her from exercising and walking.  Patient is not able to walk longer distances due to sit severe pain.  And this in turn has reflected on her gained in body weight as well has dyspnea on exertion due to disuse atrophy of the muscles.  5. Obstructive sleep apnea   Patient has obstructive sleep apnea but she is unable to tolerate her CPAP.    Discussed with patient to see the orthodontist who can help her with the new dental device and there are new products  and  or procedure to help with obstructive sleep apnea.  6. EKG   Reviewed EKG independently patient is in normal sinus rhythm with premature atrial complexes with a heart rate of 75 beats per minute normal intervals no acute ST T-wave changes.  Essentially within normal limits.  7. Patient to continue current management and I will see her back in the office in 6 months time.                Problem List Items Addressed This Visit          Pulmonary    Other secondary pulmonary hypertension       Cardiac/Vascular    Dyspnea on exertion    Essential hypertension    Nonspecific abnormal electrocardiogram (ECG) (EKG)    Abnormal nuclear stress test - Primary    Acute diastolic heart failure       Endocrine    Severe obesity       Orthopedic    Primary osteoarthritis of left knee       Other    Obstructive sleep apnea     Other Visit Diagnoses       Uncontrolled hypertension                No follow-ups on file.

## 2023-06-20 DIAGNOSIS — Z12.31 ENCOUNTER FOR SCREENING MAMMOGRAM FOR MALIGNANT NEOPLASM OF BREAST: Primary | ICD-10-CM

## 2023-06-28 ENCOUNTER — HOSPITAL ENCOUNTER (OUTPATIENT)
Dept: RADIOLOGY | Facility: HOSPITAL | Age: 78
Discharge: HOME | End: 2023-06-28
Attending: INTERNAL MEDICINE
Payer: MEDICARE

## 2023-06-28 VITALS — WEIGHT: 216.94 LBS | HEIGHT: 64 IN | BODY MASS INDEX: 37.04 KG/M2

## 2023-06-28 DIAGNOSIS — Z12.31 ENCOUNTER FOR SCREENING MAMMOGRAM FOR MALIGNANT NEOPLASM OF BREAST: ICD-10-CM

## 2023-06-28 PROCEDURE — 77067 SCR MAMMO BI INCL CAD: CPT | Mod: TC,PO

## 2023-12-12 ENCOUNTER — OFFICE VISIT (OUTPATIENT)
Dept: CARDIOLOGY | Facility: CLINIC | Age: 78
End: 2023-12-12
Payer: MEDICARE

## 2023-12-12 VITALS
HEIGHT: 64 IN | OXYGEN SATURATION: 98 % | WEIGHT: 218 LBS | HEART RATE: 68 BPM | BODY MASS INDEX: 37.22 KG/M2 | RESPIRATION RATE: 16 BRPM

## 2023-12-12 DIAGNOSIS — E66.01 SEVERE OBESITY: ICD-10-CM

## 2023-12-12 DIAGNOSIS — I10 ESSENTIAL HYPERTENSION: ICD-10-CM

## 2023-12-12 DIAGNOSIS — I27.29 OTHER SECONDARY PULMONARY HYPERTENSION: ICD-10-CM

## 2023-12-12 DIAGNOSIS — I50.32 CHRONIC DIASTOLIC HEART FAILURE: Primary | ICD-10-CM

## 2023-12-12 DIAGNOSIS — M17.12 PRIMARY OSTEOARTHRITIS OF LEFT KNEE: ICD-10-CM

## 2023-12-12 DIAGNOSIS — E78.2 MIXED HYPERLIPIDEMIA: ICD-10-CM

## 2023-12-12 DIAGNOSIS — R94.31 NONSPECIFIC ABNORMAL ELECTROCARDIOGRAM (ECG) (EKG): ICD-10-CM

## 2023-12-12 DIAGNOSIS — R06.09 DYSPNEA ON EXERTION: ICD-10-CM

## 2023-12-12 DIAGNOSIS — G47.33 OBSTRUCTIVE SLEEP APNEA: ICD-10-CM

## 2023-12-12 PROCEDURE — 93000 ELECTROCARDIOGRAM COMPLETE: CPT | Mod: S$GLB,,, | Performed by: INTERNAL MEDICINE

## 2023-12-12 PROCEDURE — 1160F PR REVIEW ALL MEDS BY PRESCRIBER/CLIN PHARMACIST DOCUMENTED: ICD-10-PCS | Mod: CPTII,S$GLB,, | Performed by: INTERNAL MEDICINE

## 2023-12-12 PROCEDURE — 99999 PR PBB SHADOW E&M-EST. PATIENT-LVL III: ICD-10-PCS | Mod: PBBFAC,,, | Performed by: INTERNAL MEDICINE

## 2023-12-12 PROCEDURE — 3288F PR FALLS RISK ASSESSMENT DOCUMENTED: ICD-10-PCS | Mod: CPTII,S$GLB,, | Performed by: INTERNAL MEDICINE

## 2023-12-12 PROCEDURE — 99214 PR OFFICE/OUTPT VISIT, EST, LEVL IV, 30-39 MIN: ICD-10-PCS | Mod: S$GLB,,, | Performed by: INTERNAL MEDICINE

## 2023-12-12 PROCEDURE — 3288F FALL RISK ASSESSMENT DOCD: CPT | Mod: CPTII,S$GLB,, | Performed by: INTERNAL MEDICINE

## 2023-12-12 PROCEDURE — 1101F PT FALLS ASSESS-DOCD LE1/YR: CPT | Mod: CPTII,S$GLB,, | Performed by: INTERNAL MEDICINE

## 2023-12-12 PROCEDURE — 1160F RVW MEDS BY RX/DR IN RCRD: CPT | Mod: CPTII,S$GLB,, | Performed by: INTERNAL MEDICINE

## 2023-12-12 PROCEDURE — 93000 EKG 12-LEAD: ICD-10-PCS | Mod: S$GLB,,, | Performed by: INTERNAL MEDICINE

## 2023-12-12 PROCEDURE — 1101F PR PT FALLS ASSESS DOC 0-1 FALLS W/OUT INJ PAST YR: ICD-10-PCS | Mod: CPTII,S$GLB,, | Performed by: INTERNAL MEDICINE

## 2023-12-12 PROCEDURE — 99214 OFFICE O/P EST MOD 30 MIN: CPT | Mod: S$GLB,,, | Performed by: INTERNAL MEDICINE

## 2023-12-12 PROCEDURE — 1159F PR MEDICATION LIST DOCUMENTED IN MEDICAL RECORD: ICD-10-PCS | Mod: CPTII,S$GLB,, | Performed by: INTERNAL MEDICINE

## 2023-12-12 PROCEDURE — 1159F MED LIST DOCD IN RCRD: CPT | Mod: CPTII,S$GLB,, | Performed by: INTERNAL MEDICINE

## 2023-12-12 PROCEDURE — 1125F AMNT PAIN NOTED PAIN PRSNT: CPT | Mod: CPTII,S$GLB,, | Performed by: INTERNAL MEDICINE

## 2023-12-12 PROCEDURE — 99999 PR PBB SHADOW E&M-EST. PATIENT-LVL III: CPT | Mod: PBBFAC,,, | Performed by: INTERNAL MEDICINE

## 2023-12-12 PROCEDURE — 1125F PR PAIN SEVERITY QUANTIFIED, PAIN PRESENT: ICD-10-PCS | Mod: CPTII,S$GLB,, | Performed by: INTERNAL MEDICINE

## 2024-01-04 ENCOUNTER — HOSPITAL ENCOUNTER (OUTPATIENT)
Dept: CARDIOLOGY | Facility: HOSPITAL | Age: 79
Discharge: HOME | End: 2024-01-04
Attending: INTERNAL MEDICINE
Payer: MEDICARE

## 2024-01-04 ENCOUNTER — HOSPITAL ENCOUNTER (OUTPATIENT)
Dept: RADIOLOGY | Facility: HOSPITAL | Age: 79
Discharge: HOME | End: 2024-01-04
Attending: INTERNAL MEDICINE
Payer: MEDICARE

## 2024-01-04 VITALS — BODY MASS INDEX: 37.22 KG/M2 | WEIGHT: 218 LBS | HEIGHT: 64 IN

## 2024-01-04 DIAGNOSIS — M54.50 LOW BACK PAIN: ICD-10-CM

## 2024-01-04 DIAGNOSIS — I50.32 CHRONIC DIASTOLIC HEART FAILURE: ICD-10-CM

## 2024-01-04 DIAGNOSIS — I27.29 OTHER SECONDARY PULMONARY HYPERTENSION: ICD-10-CM

## 2024-01-04 DIAGNOSIS — M54.50 LOW BACK PAIN: Primary | ICD-10-CM

## 2024-01-04 LAB
AORTIC ROOT ANNULUS: 3.4 CM
AORTIC VALVE CUSP SEPERATION: 2 CM
AV INDEX (PROSTH): 0.79
AV MEAN GRADIENT: 2 MMHG
AV PEAK GRADIENT: 4 MMHG
AV VALVE AREA BY VELOCITY RATIO: 2.68 CM²
AV VALVE AREA: 2.73 CM²
AV VELOCITY RATIO: 0.77
BSA FOR ECHO PROCEDURE: 2.11 M2
CV ECHO LV RWT: 0.51 CM
DOP CALC AO PEAK VEL: 1.06 M/S
DOP CALC AO VTI: 24.5 CM
DOP CALC LVOT AREA: 3.5 CM2
DOP CALC LVOT DIAMETER: 2.1 CM
DOP CALC LVOT PEAK VEL: 0.82 M/S
DOP CALC LVOT STROKE VOLUME: 66.81 CM3
DOP CALCLVOT PEAK VEL VTI: 19.3 CM
E WAVE DECELERATION TIME: 237 MSEC
E/A RATIO: 0.66
E/E' RATIO: 13.4 M/S
ECHO LV POSTERIOR WALL: 1.23 CM (ref 0.6–1.1)
FRACTIONAL SHORTENING: 36 % (ref 28–44)
INTERVENTRICULAR SEPTUM: 1.79 CM (ref 0.6–1.1)
IVRT: 137 MSEC
LEFT ATRIUM SIZE: 5.3 CM
LEFT INTERNAL DIMENSION IN SYSTOLE: 3.11 CM (ref 2.1–4)
LEFT VENTRICLE DIASTOLIC VOLUME INDEX: 53.69 ML/M2
LEFT VENTRICLE DIASTOLIC VOLUME: 109 ML
LEFT VENTRICLE MASS INDEX: 152 G/M2
LEFT VENTRICLE SYSTOLIC VOLUME INDEX: 18.8 ML/M2
LEFT VENTRICLE SYSTOLIC VOLUME: 38.2 ML
LEFT VENTRICULAR INTERNAL DIMENSION IN DIASTOLE: 4.83 CM (ref 3.5–6)
LEFT VENTRICULAR MASS: 309.32 G
LV LATERAL E/E' RATIO: 13.4 M/S
LV SEPTAL E/E' RATIO: 13.4 M/S
LVOT MG: 1 MMHG
LVOT MV: 0.55 CM/S
MV PEAK A VEL: 1.01 M/S
MV PEAK E VEL: 0.67 M/S
MV STENOSIS PRESSURE HALF TIME: 59 MS
MV VALVE AREA P 1/2 METHOD: 3.73 CM2
PV MV: 0.72 M/S
PV PEAK GRADIENT: 5 MMHG
PV PEAK VELOCITY: 1.11 M/S
RA PRESSURE ESTIMATED: 3 MMHG
RIGHT VENTRICULAR END-DIASTOLIC DIMENSION: 3.2 CM
TDI LATERAL: 0.05 M/S
TDI SEPTAL: 0.05 M/S
TDI: 0.05 M/S
Z-SCORE OF LEFT VENTRICULAR DIMENSION IN END DIASTOLE: -2.19
Z-SCORE OF LEFT VENTRICULAR DIMENSION IN END SYSTOLE: -1.35

## 2024-01-04 PROCEDURE — 93306 TTE W/DOPPLER COMPLETE: CPT | Mod: 26,,, | Performed by: INTERNAL MEDICINE

## 2024-01-04 PROCEDURE — 93306 TTE W/DOPPLER COMPLETE: CPT

## 2024-01-04 PROCEDURE — 72110 X-RAY EXAM L-2 SPINE 4/>VWS: CPT | Mod: TC

## 2024-01-12 ENCOUNTER — OFFICE VISIT (OUTPATIENT)
Dept: ORTHOPEDICS | Facility: CLINIC | Age: 79
End: 2024-01-12
Payer: MEDICARE

## 2024-01-12 VITALS — HEIGHT: 64 IN | WEIGHT: 218 LBS | BODY MASS INDEX: 37.22 KG/M2

## 2024-01-12 DIAGNOSIS — M51.36 LUMBAR DEGENERATIVE DISC DISEASE: ICD-10-CM

## 2024-01-12 DIAGNOSIS — M46.06 SPINAL ENTHESOPATHY OF LUMBAR REGION: Primary | ICD-10-CM

## 2024-01-12 DIAGNOSIS — M54.16 LUMBAR RADICULOPATHY: ICD-10-CM

## 2024-01-12 PROCEDURE — 1125F AMNT PAIN NOTED PAIN PRSNT: CPT | Mod: CPTII,S$GLB,, | Performed by: PHYSICIAN ASSISTANT

## 2024-01-12 PROCEDURE — 99213 OFFICE O/P EST LOW 20 MIN: CPT | Mod: 25,S$GLB,, | Performed by: PHYSICIAN ASSISTANT

## 2024-01-12 PROCEDURE — 3288F FALL RISK ASSESSMENT DOCD: CPT | Mod: CPTII,S$GLB,, | Performed by: PHYSICIAN ASSISTANT

## 2024-01-12 PROCEDURE — 1160F RVW MEDS BY RX/DR IN RCRD: CPT | Mod: CPTII,S$GLB,, | Performed by: PHYSICIAN ASSISTANT

## 2024-01-12 PROCEDURE — 1159F MED LIST DOCD IN RCRD: CPT | Mod: CPTII,S$GLB,, | Performed by: PHYSICIAN ASSISTANT

## 2024-01-12 PROCEDURE — 20552 NJX 1/MLT TRIGGER POINT 1/2: CPT | Mod: S$GLB,,, | Performed by: PHYSICIAN ASSISTANT

## 2024-01-12 PROCEDURE — 1100F PTFALLS ASSESS-DOCD GE2>/YR: CPT | Mod: CPTII,S$GLB,, | Performed by: PHYSICIAN ASSISTANT

## 2024-01-12 RX ORDER — TRIAMCINOLONE ACETONIDE 40 MG/ML
40 INJECTION, SUSPENSION INTRA-ARTICULAR; INTRAMUSCULAR
Status: DISCONTINUED | OUTPATIENT
Start: 2024-01-12 | End: 2024-01-12 | Stop reason: HOSPADM

## 2024-01-12 RX ORDER — FUROSEMIDE 40 MG/1
40 TABLET ORAL
COMMUNITY
Start: 2023-09-09

## 2024-01-12 RX ADMIN — TRIAMCINOLONE ACETONIDE 40 MG: 40 INJECTION, SUSPENSION INTRA-ARTICULAR; INTRAMUSCULAR at 12:01

## 2024-01-12 NOTE — PROCEDURES
Trigger Point Injection    Performed by: Ted Salinas PA  Authorized by: Ted Salinas PA    Lumbar Paraspinal:  Right    Consent Done?:  Yes (Verbal)    Pre-Procedure:   Indications:  Pain  Site marked: the procedure site was marked     Timeout: prior to procedure the correct patient, procedure, and site was verified    Prep: patient was prepped and draped in usual sterile fashion     Local anesthesia used?: Yes    Local anesthetic:  Lidocaine 1% without epinephrine  Medications: 40 mg triamcinolone acetonide 40 mg/mL  Trigger Point Injection    Performed by: Ted Salinas PA  Authorized by: Ted Salinas PA    Lumbar Paraspinal:  Left    Consent Done?:  Yes (Verbal)    Pre-Procedure:   Indications:  Pain  Site marked: the procedure site was marked     Timeout: prior to procedure the correct patient, procedure, and site was verified    Prep: patient was prepped and draped in usual sterile fashion     Local anesthesia used?: Yes    Local anesthetic:  Lidocaine 1% without epinephrine  Medications: 40 mg triamcinolone acetonide 40 mg/mL

## 2024-01-12 NOTE — PROGRESS NOTES
Subjective:       Patient ID: Tierra Taylor is a 78 y.o. female.    Chief Complaint: Pain of the Spine (Lumbar pain XR 1/4/24 fall over a month ago pain present 1 Mth. Pin is focus in lower backing going down left leg. Pain is currently a 7/10 but increases with movement and use)      History of Present Illness    Prior to meeting with the patient I reviewed the medical chart in [facility]. This included reviewing the previous progress notes from our office, review of the patient's last appointment with their primary care provider, review of any visits to the emergency room, and review of any pain management appointments or procedures.   Patient is here for reassessment chief complaint of exacerbation of midline and bilateral low back pain, paraspinal, secondary to a recent fall.  She has been seen by her primary care physician who ordered some x-rays of her lumbar spine.    Current Medications  Current Outpatient Medications   Medication Sig Dispense Refill    furosemide (LASIX) 40 MG tablet Take 40 mg by mouth.      allopurinoL (ZYLOPRIM) 100 MG tablet Take 1 tablet (100 mg total) by mouth once daily. 90 tablet 1    aspirin 81 MG Chew Take 81 mg by mouth every evening.       bimatoprost (LUMIGAN) 0.01 % Drop 1 drop every evening.      calcium-vitamin D 600 mg(1,500mg) -400 unit Tab Take 1 tablet by mouth 2 (two) times a day.      carvediloL (COREG) 12.5 MG tablet Take 1 tablet (12.5 mg total) by mouth 2 (two) times daily. 180 tablet 1    hydrALAZINE (APRESOLINE) 25 MG tablet Take 25 mg by mouth.      rosuvastatin (CRESTOR) 20 MG tablet Take 1 tablet (20 mg total) by mouth every evening. 90 tablet 1    valsartan (DIOVAN) 160 MG tablet Take by mouth 2 (two) times daily.       No current facility-administered medications for this visit.       Allergies  Review of patient's allergies indicates:   Allergen Reactions    Lisinopril Other (See Comments)     Cough    Penicillins      swelling    Tramadol      causes itching        Past Medical History  Past Medical History:   Diagnosis Date    Arthritis     Hypertension     Sleep apnea     doesn't wear CPAP       Surgical History  Past Surgical History:   Procedure Laterality Date    BREAST BIOPSY      CERVICAL DISCECTOMY      FATTY TUMOR      HYSTERECTOMY      KNEE SURGERY         Family History:   Family History   Problem Relation Age of Onset    Hypertension Mother     Hypertension Father     Breast cancer Sister        Social History:   Social History     Socioeconomic History    Marital status: Single   Tobacco Use    Smoking status: Never    Smokeless tobacco: Never   Substance and Sexual Activity    Alcohol use: No    Drug use: No       Hospitalization/Major Diagnostic Procedure:     Review of Systems     General/Constitutional:  Chills denies. Fatigue denies. Fever denies. Weight gain denies. Weight loss denies.    Respiratory:  Shortness of breath denies.    Cardiovascular:  Chest pain denies.    Gastrointestinal:  Constipation denies. Diarrhea denies. Nausea denies. Vomiting denies.     Hematology:  Easy bruising denies. Prolonged bleeding denies.     Genitourinary:  Frequent urination denies. Pain in lower back denies. Painful urination denies.     Musculoskeletal:  See HPI for details    Skin:  Rash denies.    Neurologic:  Dizziness denies. Gait abnormalities denies. Seizures denies. Tingling/Numbess denies.    Psychiatric:  Anxiety denies. Depressed mood denies.     Objective:   Vital Signs: There were no vitals filed for this visit.     Physical Exam      General Examination:     Constitutional: The patient is alert and oriented to lace person and time. Mood is pleasant.     Head/Face: Normal facial features normal eyebrows    Eyes: Normal extraocular motion bilaterally    Lungs: Respirations are equal and unlabored    Gait is coordinated.    Cardiovascular: There are no swelling or varicosities present.    Lymphatic: Negative for adenopathy    Skin:  Normal    Neurological: Level of consciousness normal. Oriented to place person and time and situation    Psychiatric: Oriented to time place person and situation    Patient demonstrates an antalgic gait with a small base quad cane.  Bilateral and midline lumbar tenderness to palpation but no gross abnormalities.  Markedly limited lumbar extension and lumbar flexion about 70% of normal.  Bilateral lower extremities are distal neurovascular intact.    XRAY Report/ Interpretation:  Lumbar x-rays taken elsewhere but reviewed with the patient today demonstrate multilevel degenerative disc disease with osteophytes and facet arthropathy but most prominent at L3-4, L4-5, and L5-S1.      Assessment:       1. Spinal enthesopathy of lumbar region    2. Lumbar degenerative disc disease    3. Lumbar radiculopathy        Plan:       Tierra was seen today for pain.    Diagnoses and all orders for this visit:    Spinal enthesopathy of lumbar region    Lumbar degenerative disc disease    Lumbar radiculopathy         No follow-ups on file.    Patient was given bilateral lower lumbar trigger point injections each with 40 mg of Kenalog and 1 cc lidocaine.  Please see procedure report for details.  Patient will be referred to physical therapy for her lumbar spine to eval and treat.  Follow-up with us in 6 weeks or sooner if needed.  If pain persists then we will order an updated lumbar MRI with the intention of referring her to a different pain management physician.  She is seen Dr. Calderon in the past and did not enjoy working with him.    Treatment options were discussed with regards to the nature of the medical condition. Conservative pain intervention and surgical options were discussed in detail. The probability of success of each separate treatment option was discussed. The patient expressed a clear understanding of the treatment options. With regards to surgery, the procedure risk, benefits, complications, and outcomes were  discussed. No guarantees were given with regards to surgical outcome.   The risk of complications, morbidity, and mortality of patient management decisions have been made at the time of this visit. These are associated with the patient's problems, diagnostic procedures and treatment options. This includes the possible management options selected and those considered but not selected by the patient after shared medical decision making we discussed with the patient.     This note was created using Dragon voice recognition software that occasionally misinterpreted phrases or words.

## 2024-01-16 ENCOUNTER — TELEPHONE (OUTPATIENT)
Dept: CARDIOLOGY | Facility: CLINIC | Age: 79
End: 2024-01-16
Payer: MEDICARE

## 2024-01-16 NOTE — TELEPHONE ENCOUNTER
----- Message from Corinne Bhakta NP sent at 1/5/2024  1:31 PM CST -----  No concerning or significant abnormalities noted on echocardiogram. Can be discussed further at your next office visit.

## 2024-02-26 ENCOUNTER — OFFICE VISIT (OUTPATIENT)
Dept: ORTHOPEDICS | Facility: CLINIC | Age: 79
End: 2024-02-26
Payer: MEDICARE

## 2024-02-26 VITALS — WEIGHT: 218 LBS | HEIGHT: 64 IN | BODY MASS INDEX: 37.22 KG/M2

## 2024-02-26 DIAGNOSIS — M54.16 LUMBAR RADICULOPATHY: ICD-10-CM

## 2024-02-26 DIAGNOSIS — M51.36 LUMBAR DEGENERATIVE DISC DISEASE: ICD-10-CM

## 2024-02-26 DIAGNOSIS — M46.06 SPINAL ENTHESOPATHY OF LUMBAR REGION: Primary | ICD-10-CM

## 2024-02-26 PROCEDURE — 3288F FALL RISK ASSESSMENT DOCD: CPT | Mod: CPTII,S$GLB,, | Performed by: ORTHOPAEDIC SURGERY

## 2024-02-26 PROCEDURE — 1160F RVW MEDS BY RX/DR IN RCRD: CPT | Mod: CPTII,S$GLB,, | Performed by: ORTHOPAEDIC SURGERY

## 2024-02-26 PROCEDURE — 99214 OFFICE O/P EST MOD 30 MIN: CPT | Mod: S$GLB,,, | Performed by: ORTHOPAEDIC SURGERY

## 2024-02-26 PROCEDURE — 1125F AMNT PAIN NOTED PAIN PRSNT: CPT | Mod: CPTII,S$GLB,, | Performed by: ORTHOPAEDIC SURGERY

## 2024-02-26 PROCEDURE — 1101F PT FALLS ASSESS-DOCD LE1/YR: CPT | Mod: CPTII,S$GLB,, | Performed by: ORTHOPAEDIC SURGERY

## 2024-02-26 PROCEDURE — 1159F MED LIST DOCD IN RCRD: CPT | Mod: CPTII,S$GLB,, | Performed by: ORTHOPAEDIC SURGERY

## 2024-02-26 RX ORDER — TIRZEPATIDE 5 MG/.5ML
INJECTION, SOLUTION SUBCUTANEOUS
COMMUNITY
Start: 2024-01-21

## 2024-02-26 NOTE — PROGRESS NOTES
Subjective:       Patient ID: Tierra Taylor is a 78 y.o. female.    Chief Complaint: Pain of the Spine (F/u on lumbar pain after 1.12.24 TPI and PT referral notes improvement.)      History of Present Illness    Prior to meeting with the patient I reviewed the medical chart in [facility]. This included reviewing the previous progress notes from our office, review of the patient's last appointment with their primary care provider, review of any visits to the emergency room, and review of any pain management appointments or procedures.   Patient is here follow-up for exacerbation of midline and bilateral low back pain, paraspinal, secondary to a recent fall.  When I saw her about 6 weeks ago she was given bilateral lumbar trigger point injections which she states resolved her exacerbated symptoms.  Therefore she did not attend physical therapy as I recommended.  She remains doing great.    Current Medications  Current Outpatient Medications   Medication Sig Dispense Refill    MOUNJARO 5 mg/0.5 mL PnIj Inject into the skin.      allopurinoL (ZYLOPRIM) 100 MG tablet Take 1 tablet (100 mg total) by mouth once daily. 90 tablet 1    aspirin 81 MG Chew Take 81 mg by mouth every evening.       bimatoprost (LUMIGAN) 0.01 % Drop 1 drop every evening.      calcium-vitamin D 600 mg(1,500mg) -400 unit Tab Take 1 tablet by mouth 2 (two) times a day.      carvediloL (COREG) 12.5 MG tablet Take 1 tablet (12.5 mg total) by mouth 2 (two) times daily. 180 tablet 1    furosemide (LASIX) 40 MG tablet Take 40 mg by mouth.      hydrALAZINE (APRESOLINE) 25 MG tablet Take 25 mg by mouth.      rosuvastatin (CRESTOR) 20 MG tablet Take 1 tablet (20 mg total) by mouth every evening. 90 tablet 1    valsartan (DIOVAN) 160 MG tablet Take by mouth 2 (two) times daily.       No current facility-administered medications for this visit.       Allergies  Review of patient's allergies indicates:   Allergen Reactions    Lisinopril Other (See Comments)      Cough    Penicillins      swelling    Tramadol      causes itching       Past Medical History  Past Medical History:   Diagnosis Date    Arthritis     Hypertension     Sleep apnea     doesn't wear CPAP       Surgical History  Past Surgical History:   Procedure Laterality Date    BREAST BIOPSY      CERVICAL DISCECTOMY      FATTY TUMOR      HYSTERECTOMY      KNEE SURGERY         Family History:   Family History   Problem Relation Age of Onset    Hypertension Mother     Hypertension Father     Breast cancer Sister        Social History:   Social History     Socioeconomic History    Marital status: Single   Tobacco Use    Smoking status: Never    Smokeless tobacco: Never   Substance and Sexual Activity    Alcohol use: No    Drug use: No       Hospitalization/Major Diagnostic Procedure:     Review of Systems     General/Constitutional:  Chills denies. Fatigue denies. Fever denies. Weight gain denies. Weight loss denies.    Respiratory:  Shortness of breath denies.    Cardiovascular:  Chest pain denies.    Gastrointestinal:  Constipation denies. Diarrhea denies. Nausea denies. Vomiting denies.     Hematology:  Easy bruising denies. Prolonged bleeding denies.     Genitourinary:  Frequent urination denies. Pain in lower back denies. Painful urination denies.     Musculoskeletal:  See HPI for details    Skin:  Rash denies.    Neurologic:  Dizziness denies. Gait abnormalities denies. Seizures denies. Tingling/Numbess denies.    Psychiatric:  Anxiety denies. Depressed mood denies.     Objective:   Vital Signs: There were no vitals filed for this visit.     Physical Exam      General Examination:     Constitutional: The patient is alert and oriented to lace person and time. Mood is pleasant.     Head/Face: Normal facial features normal eyebrows    Eyes: Normal extraocular motion bilaterally    Lungs: Respirations are equal and unlabored    Gait is coordinated.    Cardiovascular: There are no swelling or varicosities  present.    Lymphatic: Negative for adenopathy    Skin: Normal    Neurological: Level of consciousness normal. Oriented to place person and time and situation    Psychiatric: Oriented to time place person and situation    Mild antalgic gait with a straight cane and a forward flexed posture.  Lumbar range of motion within functional limitations.  Bilateral lower extremities are distal neurovascular intact.    XRAY Report/ Interpretation:  No new studies today      Assessment:       1. Spinal enthesopathy of lumbar region    2. Lumbar degenerative disc disease    3. Lumbar radiculopathy        Plan:       Tierra was seen today for pain.    Diagnoses and all orders for this visit:    Spinal enthesopathy of lumbar region    Lumbar degenerative disc disease    Lumbar radiculopathy         No follow-ups on file.  This is to attest that the physician's assistant, Ted Salinas served in the capacity as a scribe for this patient's encounter.  This is also verify that I have reviewed the patient's history and help formulate the treatment plan as discussed with the physician's assistant.  I have actively participated in the evaluation and treatment plan for this patient visit.  The treatment plan and medical decision-making is outlined below    Activity as tolerated.  Follow-up p.r.n..  Advised to exercise at least 4 days a week; this includes ambulation.    Treatment options were discussed with regards to the nature of the medical condition. Conservative pain intervention and surgical options were discussed in detail. The probability of success of each separate treatment option was discussed. The patient expressed a clear understanding of the treatment options. With regards to surgery, the procedure risk, benefits, complications, and outcomes were discussed. No guarantees were given with regards to surgical outcome.   The risk of complications, morbidity, and mortality of patient management decisions have been made at  the time of this visit. These are associated with the patient's problems, diagnostic procedures and treatment options. This includes the possible management options selected and those considered but not selected by the patient after shared medical decision making we discussed with the patient.     This note was created using Dragon voice recognition software that occasionally misinterpreted phrases or words.

## 2024-07-05 DIAGNOSIS — Z12.31 ENCOUNTER FOR SCREENING MAMMOGRAM FOR MALIGNANT NEOPLASM OF BREAST: Primary | ICD-10-CM

## 2024-07-09 ENCOUNTER — HOSPITAL ENCOUNTER (OUTPATIENT)
Dept: RADIOLOGY | Facility: HOSPITAL | Age: 79
Discharge: HOME | End: 2024-07-09
Attending: INTERNAL MEDICINE
Payer: MEDICARE

## 2024-07-09 VITALS — HEIGHT: 64 IN | WEIGHT: 218 LBS | BODY MASS INDEX: 37.22 KG/M2

## 2024-07-09 DIAGNOSIS — Z12.31 ENCOUNTER FOR SCREENING MAMMOGRAM FOR MALIGNANT NEOPLASM OF BREAST: ICD-10-CM

## 2024-07-09 PROCEDURE — 77067 SCR MAMMO BI INCL CAD: CPT | Mod: 26,,, | Performed by: RADIOLOGY

## 2024-07-09 PROCEDURE — 77063 BREAST TOMOSYNTHESIS BI: CPT | Mod: 26,,, | Performed by: RADIOLOGY

## 2024-07-09 PROCEDURE — 77067 SCR MAMMO BI INCL CAD: CPT | Mod: TC,PO

## 2024-07-09 PROCEDURE — 77063 BREAST TOMOSYNTHESIS BI: CPT | Mod: TC,PO

## 2025-02-14 ENCOUNTER — TELEPHONE (OUTPATIENT)
Dept: CARDIOLOGY | Facility: CLINIC | Age: 80
End: 2025-02-14
Payer: MEDICARE

## 2025-02-14 NOTE — TELEPHONE ENCOUNTER
----- Message from Valeria sent at 2/14/2025  9:26 AM CST -----  Type:  Sooner Apoointment Request    Caller is requesting a sooner appointment.  Caller declined first available appointment listed below.  Caller will not accept being placed on the waitlist and is requesting a message be sent to doctor.  Name of Caller:pt  When is the first available appointment?dept. booked    Would the patient rather a call back or a response via [facility]? Call back  Best Call Back Number:[phone]   Additional Information: pt was a pt of fide & asking to be seen with maggy now. Requesting an appt as soon as possible. Please call to discuss.

## 2025-02-17 ENCOUNTER — OFFICE VISIT (OUTPATIENT)
Dept: CARDIOLOGY | Facility: CLINIC | Age: 80
End: 2025-02-17
Payer: MEDICARE

## 2025-02-17 VITALS
SYSTOLIC BLOOD PRESSURE: 190 MMHG | HEART RATE: 75 BPM | WEIGHT: 215.38 LBS | DIASTOLIC BLOOD PRESSURE: 72 MMHG | OXYGEN SATURATION: 95 % | BODY MASS INDEX: 36.97 KG/M2

## 2025-02-17 DIAGNOSIS — Z91.199 NONCOMPLIANCE WITH CPAP TREATMENT: ICD-10-CM

## 2025-02-17 DIAGNOSIS — R94.31 NONSPECIFIC ABNORMAL ELECTROCARDIOGRAM (ECG) (EKG): ICD-10-CM

## 2025-02-17 DIAGNOSIS — E78.2 MIXED HYPERLIPIDEMIA: ICD-10-CM

## 2025-02-17 DIAGNOSIS — G47.33 OBSTRUCTIVE SLEEP APNEA: ICD-10-CM

## 2025-02-17 DIAGNOSIS — I10 ESSENTIAL HYPERTENSION: ICD-10-CM

## 2025-02-17 DIAGNOSIS — E66.01 SEVERE OBESITY: ICD-10-CM

## 2025-02-17 DIAGNOSIS — R53.83 FATIGUE, UNSPECIFIED TYPE: ICD-10-CM

## 2025-02-17 DIAGNOSIS — I50.32 CHRONIC DIASTOLIC HEART FAILURE: ICD-10-CM

## 2025-02-17 DIAGNOSIS — R06.09 DYSPNEA ON EXERTION: ICD-10-CM

## 2025-02-17 DIAGNOSIS — R06.02 SOB (SHORTNESS OF BREATH) ON EXERTION: Primary | ICD-10-CM

## 2025-02-17 DIAGNOSIS — R01.1 UNDIAGNOSED CARDIAC MURMURS: ICD-10-CM

## 2025-02-17 RX ORDER — HYDRALAZINE HYDROCHLORIDE 25 MG/1
25 TABLET, FILM COATED ORAL 3 TIMES DAILY
Qty: 90 TABLET | Refills: 0 | Status: SHIPPED | OUTPATIENT
Start: 2025-02-17 | End: 2026-02-17

## 2025-02-17 RX ORDER — AMLODIPINE BESYLATE 2.5 MG/1
2.5 TABLET ORAL DAILY
Qty: 90 TABLET | Refills: 3 | Status: SHIPPED | OUTPATIENT
Start: 2025-02-17 | End: 2026-02-17

## 2025-02-17 NOTE — PROGRESS NOTES
" Subjective:    Patient ID:  Tierra Taylor is a 79 y.o. female patient here for evaluation Results (F/u Echo ) and Shortness of Breath (Ongoing )    History of Present Illness:     Patient is 79-year-old female in clinic today with her daughter  She has been followed by Dr. JULISSA Hernandez.      Daughter states main reason she is here is regarding Dr Castro did a "CAD" score, not a calcium score says  Score showed elevation in number over 2 yr period    Pt states her "BP never normal"; Usually around 170-190; does miss some dosages she admits;   Looks she is only taking hydralazine once daily  Reportedly she had normal labs at outside labs    Pt reports she is experiencing shortness of breath and fatigue;   These symptoms dont occur at rest but on exertion  She has to rest and symptoms improve  Denies chest pain, flutters, PND, orthopnea or fluid retention    Has MARIA R, can't tolerate CPAP  Never smoked; no ETOH  Lives alone; uses cane      Most Recent Echocardiogram Results  Results for orders placed during the hospital encounter of 01/04/24    Echo    Interpretation Summary    Left Ventricle: The left ventricle is normal in size. Normal wall thickness. Normal wall motion. There is normal systolic function with a visually estimated ejection fraction of 60 - 65%. There is normal diastolic function.    Right Ventricle: Normal right ventricular cavity size. Wall thickness is normal. Right ventricle wall motion  is normal. Systolic function is normal.    Left Atrium: Left atrium is moderately dilated.    Aortic Valve: The aortic valve is structurally normal. Mildly calcified left, right and noncoronary cusps.    IVC/SVC: Normal venous pressure at 3 mmHg.      Most Recent Nuclear Stress Test Results  Results for orders placed during the hospital encounter of 04/01/21    Nuclear Stress - Cardiology Interpreted    Interpretation Summary    Equivocal myocardial perfusion scan.    There is a moderate sized, fixed defect " consistent with scar in the anterior and apical wall(s).  No reversible focal perfusion defect.    There is a moderate intensity defect in the anteroapical wall of the left ventricle, secondary to breast attenuation.    There is a moderate intensity fixed defect in the  wall of the left ventricle, secondary to soft tissue attenuation.    The gated perfusion images showed an ejection fraction of 70% post stress. Normal ejection fraction is greater than 53%.    There is normal wall motion at rest and post stress.    LV cavity size is normal at rest and normal at stress.    The EKG portion of this study is negative for ischemia.    The patient reported no chest pain during the stress test.      Most Recent Cardiac PET Stress Test Results  No results found for this or any previous visit.      Most Recent Cardiovascular Angiogram results  No results found for this or any previous visit.      Other Most Recent Cardiology Results  No results found for this or any previous visit.      REVIEW OF SYSTEMS: As noted in HPI       Past Medical History:   Diagnosis Date    Arthritis     Hypertension     Sleep apnea     doesn't wear CPAP     Past Surgical History:   Procedure Laterality Date    BREAST BIOPSY      CERVICAL DISCECTOMY      FATTY TUMOR      HYSTERECTOMY      KNEE SURGERY       Social History[1]      Objective      Vitals:    02/17/25 0851   BP: (!) 190/72   Pulse: 75       LAST EKG  Results for orders placed or performed in visit on 12/12/23   IN OFFICE EKG 12-LEAD (to [place])    Collection Time: 12/12/23  2:32 PM    Narrative    Test Reason : R94.31,    Vent. Rate : 068 BPM     Atrial Rate : 068 BPM     P-R Int : 196 ms          QRS Dur : 108 ms      QT Int : 424 ms       P-R-T Axes : 067 075 051 degrees     QTc Int : 450 ms    Normal sinus rhythm  Cannot rule out Inferior infarct ,age undetermined  Abnormal ECG  When compared with ECG of 05-JUN-2023 14:34,  Premature atrial complexes are no longer Present  Minimal  "criteria for Inferior infarct are now Present  Confirmed by Jaden Hernandez MD ([id number]) on 12/15/2023 6:49:20 PM    Referred By:             Confirmed By:Jaden Hernandez MD     LIPIDS - LAST 2   Lab Results   Component Value Date    CHOL 129 11/27/2020    CHOL 124 03/25/2020    HDL 55 11/27/2020    HDL 58 03/25/2020    LDLCALC 58 11/27/2020    LDLCALC 52 03/25/2020    TRIG 81 11/27/2020    TRIG 59 03/25/2020    CHOLHDL 2.3 11/27/2020    CHOLHDL 2.1 03/25/2020     CARDIAC PROFILE - LAST 2  No results found for: "BNP", "CPK", "CPKMB", "LDH", "TROPONINI"   CBC - LAST 2  Lab Results   Component Value Date    WBC 5.98 04/04/2022    WBC 5.9 11/27/2020    RBC 4.34 04/04/2022    RBC 4.25 11/27/2020    HGB 13.2 04/04/2022    HGB 12.8 11/27/2020    HCT 42.6 04/04/2022    HCT 40.6 11/27/2020     04/04/2022     11/27/2020     Lab Results   Component Value Date    INR 1.01 04/28/2013     CHEMISTRY - LAST 2  Lab Results   Component Value Date     04/04/2022     11/30/2021    K 3.8 04/04/2022    K 3.7 11/30/2021     04/04/2022     11/30/2021    CO2 27 04/04/2022    CO2 34 (H) 11/30/2021    ANIONGAP 13 04/04/2022    ANIONGAP 8 08/12/2019    BUN 8 04/04/2022    BUN 18 11/30/2021    CREATININE 0.8 04/04/2022    CREATININE 0.72 11/30/2021     (H) 04/04/2022    GLU 96 11/30/2021    CALCIUM 9.5 04/04/2022    CALCIUM 9.6 11/30/2021    ALBUMIN 3.8 04/04/2022    ALBUMIN 4.0 11/30/2021    PROT 8.0 04/04/2022    PROT 7.0 11/30/2021    ALKPHOS 69 04/04/2022    ALKPHOS 74 03/25/2020    ALT 25 04/04/2022    ALT 18 11/30/2021    AST 23 04/04/2022    AST 19 11/30/2021    BILITOT 0.9 04/04/2022    BILITOT 1.0 11/30/2021      ENDOCRINE - LAST 2  Lab Results   Component Value Date    HGBA1C 5.4 11/30/2021        PHYSICAL EXAM  CONSTITUTIONAL: very pleasant elderly female breathing comfortably in no apparent distress  NECK: no carotid bruit, no JVD  LUNGS: CTA  CHEST WALL: no tenderness  HEART: regular rate " and rhythm, S1, S2 normal, +systolic murmur  ABDOMEN: soft, non-tender; bowel sounds normal; no masses  EXTREMITIES: Extremities normal, no edema, no calf tenderness noted  NEURO: AAO X 3, speech clear, memory clear    I HAVE REVIEWED :    The vital signs, most recent cardiac testing, and most recent pertinent non-cardiology provider notes.    Current Outpatient Medications   Medication Instructions    allopurinoL (ZYLOPRIM) 100 mg, Oral, Daily    amLODIPine (NORVASC) 2.5 mg, Oral, Daily    aspirin 81 mg, Nightly    calcium-vitamin D 600 mg(1,500mg) -400 unit Tab 1 tablet, 2 times daily    carvediloL (COREG) 12.5 mg, Oral, 2 times daily    furosemide (LASIX) 40 mg    hydrALAZINE (APRESOLINE) 25 mg, Oral, 3 times daily    MOUNJARO 5 mg/0.5 mL PnIj Inject into the skin.    rosuvastatin (CRESTOR) 20 mg, Oral, Nightly    valsartan (DIOVAN) 160 MG tablet 2 times daily      Assessment & Plan     Chronic diastolic heart failure  Breath sounds are clear, denies fluid retention.  No edema on exam  Continue valsartan, Lasix, Coreg  Hesitant to start Farxiga or Jardiance just yet as I do not have her kidney numbers  Patient daughter will bring labs at next visit  Discussion on low-sodium intake    Dyspnea on exertion  She is reporting shortness of breath on exertion along with fatigue.  This is alleviated with rest.  We will check echocardiogram and stress test for further workup.    Essential hypertension  Blood pressure is elevated in office today and reportedly 170-190 at home as well  Added amlodipine 2.5 mg daily.  She has been taking hydralazine 25 mg only once daily, increase to t.i.d.  Continue Valsartan 160 mg BID and Coreg 12.5 mg BID  Counseled on low-sodium intake  Pt to bring recently drawn labs from outside facility     Nonspecific abnormal electrocardiogram (ECG) (EKG)  TWI V1  Check echocardiogram and stress test      Obstructive sleep apnea  Unable to tolerate CPAP  Defer to Dr Castro   May be factor in  uncontrolled BP  Daughter states she doesn't sleep well at all      Severe obesity  BMI 36.97  Weight loss would improve BP  Will advise exercise regimen if stress test and echocardiogram are normal    Mixed hyperlipidemia  She takes rosuvastatin 20 mg at night for this.  Patient recently had lab work at outside facility advised to bring this with her next visit.  Daughter agrees to do so.  In the meantime maintain a heart healthy low-sodium diet    Undiagnosed cardiac murmurs  Systolic murmur noted right sternal border  Obtain echocardiogram to evaluate LV and valve function            No follow-ups on file.              SUZY Chao-C                 [1]   Social History  Tobacco Use    Smoking status: Never    Smokeless tobacco: Never   Substance Use Topics    Alcohol use: No    Drug use: No

## 2025-02-17 NOTE — ASSESSMENT & PLAN NOTE
Unable to tolerate CPAP  Defer to Dr Castro   May be factor in uncontrolled BP  Daughter states she doesn't sleep well at all

## 2025-02-17 NOTE — ASSESSMENT & PLAN NOTE
Daughter states she doesn't sleep well at all  Previously unable to tolerate CPAP  Defer to PCP Dr Castro   foreign body located

## 2025-02-17 NOTE — ASSESSMENT & PLAN NOTE
Systolic murmur noted right sternal border  Obtain echocardiogram to evaluate LV and valve function

## 2025-02-17 NOTE — ASSESSMENT & PLAN NOTE
Blood pressure is elevated in office today and reportedly 170-190 at home as well  Added amlodipine 2.5 mg daily.  She has been taking hydralazine 25 mg only once daily, increase to t.i.d.  Continue Valsartan 160 mg BID and Coreg 12.5 mg BID  Counseled on low-sodium intake  Pt to bring recently drawn labs from outside facility

## 2025-02-17 NOTE — ASSESSMENT & PLAN NOTE
Breath sounds are clear, denies fluid retention.  No edema on exam  Continue valsartan, Lasix, Coreg  Hesitant to start Farxiga or Jardiance just yet as I do not have her kidney numbers  Patient daughter will bring labs at next visit  Discussion on low-sodium intake

## 2025-02-17 NOTE — ASSESSMENT & PLAN NOTE
She is reporting shortness of breath on exertion along with fatigue.  This is alleviated with rest.  We will check echocardiogram and stress test for further workup.

## 2025-02-17 NOTE — ASSESSMENT & PLAN NOTE
She takes rosuvastatin 20 mg at night for this.  Patient recently had lab work at outside facility advised to bring this with her next visit.  Daughter agrees to do so.  In the meantime maintain a heart healthy low-sodium diet

## 2025-02-17 NOTE — ASSESSMENT & PLAN NOTE
BMI 36.97  Weight loss would improve BP  Will advise exercise regimen if stress test and echocardiogram are normal

## 2025-02-19 LAB
OHS QRS DURATION: 100 MS
OHS QTC CALCULATION: 464 MS

## 2025-02-24 ENCOUNTER — TELEPHONE (OUTPATIENT)
Dept: CARDIOLOGY | Facility: HOSPITAL | Age: 80
End: 2025-02-24

## 2025-02-24 NOTE — TELEPHONE ENCOUNTER
Patient advised, test will be at [facility] ([address] [facility]).  Will need to register on the first floor at the main entrance.  Patient advised that arrival time is 06:40.  Patient advised that they may be here about 3.5-4 hours, and may want to bring something to occupy their time, as there will be periods of waiting.    Patient advised, may take her medications prior to testing if you need to. Advised if food is needed to take medications, please keep it light, like toast and juice.    Patient advised to avoid all caffeine 12 hours prior to testing.  This includes sodas, chocolate, tea and decaf coffee.    Will provide peanut butter crackers for a snack after stress test.  If patient would prefer something else, please bring a snack from home.    Wear comfortable clothing.  No lotions, oils, or powders to the upper chest area. May wear deodorant.    No metal jewelry, buttons, or zippers to the upper body.  Patient verbalizes understanding of instructions.

## 2025-02-25 ENCOUNTER — HOSPITAL ENCOUNTER (OUTPATIENT)
Dept: RADIOLOGY | Facility: HOSPITAL | Age: 80
Discharge: HOME | End: 2025-02-25
Attending: NURSE PRACTITIONER
Payer: MEDICARE

## 2025-02-25 ENCOUNTER — HOSPITAL ENCOUNTER (OUTPATIENT)
Dept: CARDIOLOGY | Facility: HOSPITAL | Age: 80
Discharge: HOME | End: 2025-02-25
Attending: NURSE PRACTITIONER
Payer: MEDICARE

## 2025-02-25 VITALS — WEIGHT: 215 LBS | HEIGHT: 64 IN | BODY MASS INDEX: 36.7 KG/M2

## 2025-02-25 DIAGNOSIS — I50.32 CHRONIC DIASTOLIC HEART FAILURE: ICD-10-CM

## 2025-02-25 DIAGNOSIS — R53.83 FATIGUE, UNSPECIFIED TYPE: ICD-10-CM

## 2025-02-25 DIAGNOSIS — R06.02 SOB (SHORTNESS OF BREATH) ON EXERTION: ICD-10-CM

## 2025-02-25 LAB
AORTIC ROOT ANNULUS: 3.6 CM
AORTIC VALVE CUSP SEPERATION: 1.4 CM
APICAL FOUR CHAMBER EJECTION FRACTION: 61 %
AV INDEX (PROSTH): 0.61
AV MEAN GRADIENT: 4 MMHG
AV PEAK GRADIENT: 8 MMHG
AV VALVE AREA BY VELOCITY RATIO: 1.8 CM²
AV VALVE AREA: 1.9 CM²
AV VELOCITY RATIO: 0.57
BSA FOR ECHO PROCEDURE: 2.1 M2
CV ECHO LV RWT: 0.64 CM
CV PHARM DOSE: 0.4 MG
CV STRESS BASE HR: 68 BPM
DIASTOLIC BLOOD PRESSURE: 89 MMHG
DOP CALC AO PEAK VEL: 1.4 M/S
DOP CALC AO VTI: 30.2 CM
DOP CALC LVOT AREA: 3.1 CM2
DOP CALC LVOT DIAMETER: 2 CM
DOP CALC LVOT PEAK VEL: 0.8 M/S
DOP CALC LVOT STROKE VOLUME: 57.5 CM3
DOP CALCLVOT PEAK VEL VTI: 18.3 CM
E WAVE DECELERATION TIME: 195 MSEC
E/A RATIO: 0.66
E/E' RATIO: 10 M/S
ECHO LV POSTERIOR WALL: 1.4 CM (ref 0.6–1.1)
EJECTION FRACTION- HIGH: 65 %
END DIASTOLIC INDEX-HIGH: 153 ML/M2
END DIASTOLIC INDEX-LOW: 93 ML/M2
END SYSTOLIC INDEX-HIGH: 71 ML/M2
END SYSTOLIC INDEX-LOW: 31 ML/M2
FRACTIONAL SHORTENING: 34.1 % (ref 28–44)
INTERVENTRICULAR SEPTUM: 1.6 CM (ref 0.6–1.1)
IVRT: 111 MSEC
LEFT ATRIUM SIZE: 5.3 CM
LEFT INTERNAL DIMENSION IN SYSTOLE: 2.9 CM (ref 2.1–4)
LEFT VENTRICLE DIASTOLIC VOLUME INDEX: 44.06 ML/M2
LEFT VENTRICLE DIASTOLIC VOLUME: 89 ML
LEFT VENTRICLE END DIASTOLIC VOLUME APICAL 4 CHAMBER: 62.6 ML
LEFT VENTRICLE MASS INDEX: 132.1 G/M2
LEFT VENTRICLE SYSTOLIC VOLUME INDEX: 15.8 ML/M2
LEFT VENTRICLE SYSTOLIC VOLUME: 32 ML
LEFT VENTRICULAR INTERNAL DIMENSION IN DIASTOLE: 4.4 CM (ref 3.5–6)
LEFT VENTRICULAR MASS: 266.9 G
LV LATERAL E/E' RATIO: 10.8 M/S
LV SEPTAL E/E' RATIO: 9.3 M/S
LVED V (TEICH): 88.6 ML
LVES V (TEICH): 32.2 ML
LVOT MG: 1 MMHG
LVOT MV: 0.56 CM/S
MV PEAK A VEL: 0.99 M/S
MV PEAK E VEL: 0.65 M/S
MV STENOSIS PRESSURE HALF TIME: 44 MS
MV VALVE AREA P 1/2 METHOD: 5 CM2
NUC REST DIASTOLIC VOLUME INDEX: 113
NUC REST EJECTION FRACTION: 69
NUC REST SYSTOLIC VOLUME INDEX: 35
NUC STRESS DIASTOLIC VOLUME INDEX: 85
NUC STRESS EJECTION FRACTION: 64 %
NUC STRESS SYSTOLIC VOLUME INDEX: 31
OHS CV CPX 1 MINUTE RECOVERY HEART RATE: 107 BPM
OHS CV CPX 85 PERCENT MAX PREDICTED HEART RATE MALE: 118
OHS CV CPX MAX PREDICTED HEART RATE: 139
OHS CV CPX PATIENT IS FEMALE: 1
OHS CV CPX PATIENT IS MALE: 0
OHS CV CPX PEAK DIASTOLIC BLOOD PRESSURE: 93 MMHG
OHS CV CPX PEAK HEAR RATE: 107 BPM
OHS CV CPX PEAK RATE PRESSURE PRODUCT: NORMAL
OHS CV CPX PEAK SYSTOLIC BLOOD PRESSURE: 200 MMHG
OHS CV CPX PERCENT MAX PREDICTED HEART RATE ACHIEVED: 79
OHS CV CPX RATE PRESSURE PRODUCT PRESENTING: NORMAL
OHS CV INITIAL DOSE: 11.8 MCG/KG/MIN
OHS CV PEAK DOSE: 27.5 MCG/KG/MIN
OHS CV RV/LV RATIO: 0.68 CM
PISA TR MAX VEL: 2.2 M/S
RA PRESSURE ESTIMATED: 3 MMHG
RETIRED EF AND QEF - SEE NOTES: 53 %
RIGHT VENTRICLE DIASTOLIC BASEL DIMENSION: 3 CM
RIGHT VENTRICULAR END-DIASTOLIC DIMENSION: 2.98 CM
RV TB RVSP: 5 MMHG
SYSTOLIC BLOOD PRESSURE: 197 MMHG
TDI LATERAL: 0.06 M/S
TDI SEPTAL: 0.07 M/S
TDI: 0.07 M/S
TV REST PULMONARY ARTERY PRESSURE: 22 MMHG
Z-SCORE OF LEFT VENTRICULAR DIMENSION IN END DIASTOLE: -3.02
Z-SCORE OF LEFT VENTRICULAR DIMENSION IN END SYSTOLE: -1.82

## 2025-02-25 PROCEDURE — 63600175 PHARM REV CODE 636 W HCPCS: Performed by: NURSE PRACTITIONER

## 2025-02-25 PROCEDURE — 93016 CV STRESS TEST SUPVJ ONLY: CPT | Mod: ,,, | Performed by: INTERNAL MEDICINE

## 2025-02-25 PROCEDURE — A9502 TC99M TETROFOSMIN: HCPCS | Performed by: NURSE PRACTITIONER

## 2025-02-25 PROCEDURE — 78452 HT MUSCLE IMAGE SPECT MULT: CPT | Mod: 26,,, | Performed by: INTERNAL MEDICINE

## 2025-02-25 PROCEDURE — 78452 HT MUSCLE IMAGE SPECT MULT: CPT

## 2025-02-25 PROCEDURE — 93306 TTE W/DOPPLER COMPLETE: CPT | Mod: 26,,, | Performed by: INTERNAL MEDICINE

## 2025-02-25 PROCEDURE — 93306 TTE W/DOPPLER COMPLETE: CPT

## 2025-02-25 PROCEDURE — 93018 CV STRESS TEST I&R ONLY: CPT | Mod: ,,, | Performed by: INTERNAL MEDICINE

## 2025-02-25 RX ORDER — REGADENOSON 0.08 MG/ML
0.4 INJECTION, SOLUTION INTRAVENOUS ONCE
Status: COMPLETED | OUTPATIENT
Start: 2025-02-25 | End: 2025-02-25

## 2025-02-25 RX ADMIN — TETROFOSMIN 27.5 MILLICURIE: 1.38 INJECTION, POWDER, LYOPHILIZED, FOR SOLUTION INTRAVENOUS at 08:02

## 2025-02-25 RX ADMIN — TETROFOSMIN 11.8 MILLICURIE: 1.38 INJECTION, POWDER, LYOPHILIZED, FOR SOLUTION INTRAVENOUS at 06:02

## 2025-02-25 RX ADMIN — REGADENOSON 0.4 MG: 0.08 INJECTION, SOLUTION INTRAVENOUS at 08:02

## 2025-02-26 ENCOUNTER — TELEPHONE (OUTPATIENT)
Dept: CARDIOLOGY | Facility: CLINIC | Age: 80
End: 2025-02-26
Payer: MEDICARE

## 2025-02-26 DIAGNOSIS — R94.39 ABNORMAL CARDIOVASCULAR STRESS TEST: Primary | ICD-10-CM

## 2025-02-27 DIAGNOSIS — R93.1 ABNORMAL FINDINGS ON DIAGNOSTIC IMAGING OF HEART AND CORONARY CIRCULATION: ICD-10-CM

## 2025-02-27 DIAGNOSIS — R94.39 ABNORMAL CARDIOVASCULAR STRESS TEST: Primary | ICD-10-CM

## 2025-03-13 RX ORDER — HYDRALAZINE HYDROCHLORIDE 25 MG/1
25 TABLET, FILM COATED ORAL 3 TIMES DAILY
Qty: 90 TABLET | Refills: 0 | Status: SHIPPED | OUTPATIENT
Start: 2025-03-13

## 2025-03-18 ENCOUNTER — OFFICE VISIT (OUTPATIENT)
Dept: CARDIOLOGY | Facility: CLINIC | Age: 80
End: 2025-03-18
Payer: MEDICARE

## 2025-03-18 VITALS
BODY MASS INDEX: 37.36 KG/M2 | WEIGHT: 218.81 LBS | DIASTOLIC BLOOD PRESSURE: 95 MMHG | SYSTOLIC BLOOD PRESSURE: 191 MMHG | OXYGEN SATURATION: 95 % | HEART RATE: 73 BPM | HEIGHT: 64 IN

## 2025-03-18 DIAGNOSIS — E78.2 MIXED HYPERLIPIDEMIA: ICD-10-CM

## 2025-03-18 DIAGNOSIS — I50.32 CHRONIC DIASTOLIC HEART FAILURE: ICD-10-CM

## 2025-03-18 DIAGNOSIS — R94.39 ABNORMAL NUCLEAR STRESS TEST: ICD-10-CM

## 2025-03-18 DIAGNOSIS — I10 UNCONTROLLED HYPERTENSION: Primary | ICD-10-CM

## 2025-03-18 DIAGNOSIS — I10 ESSENTIAL HYPERTENSION: ICD-10-CM

## 2025-03-18 PROCEDURE — 3077F SYST BP >= 140 MM HG: CPT | Mod: CPTII,S$GLB,, | Performed by: NURSE PRACTITIONER

## 2025-03-18 PROCEDURE — 1101F PT FALLS ASSESS-DOCD LE1/YR: CPT | Mod: CPTII,S$GLB,, | Performed by: NURSE PRACTITIONER

## 2025-03-18 PROCEDURE — 99999 PR PBB SHADOW E&M-EST. PATIENT-LVL IV: CPT | Mod: PBBFAC,,, | Performed by: NURSE PRACTITIONER

## 2025-03-18 PROCEDURE — 3288F FALL RISK ASSESSMENT DOCD: CPT | Mod: CPTII,S$GLB,, | Performed by: NURSE PRACTITIONER

## 2025-03-18 PROCEDURE — 99214 OFFICE O/P EST MOD 30 MIN: CPT | Mod: S$GLB,,, | Performed by: NURSE PRACTITIONER

## 2025-03-18 PROCEDURE — 3080F DIAST BP >= 90 MM HG: CPT | Mod: CPTII,S$GLB,, | Performed by: NURSE PRACTITIONER

## 2025-03-18 RX ORDER — FLUTICASONE PROPIONATE 50 MCG
1 SPRAY, SUSPENSION (ML) NASAL DAILY
COMMUNITY
Start: 2025-01-03

## 2025-03-19 ENCOUNTER — HOSPITAL ENCOUNTER (EMERGENCY)
Facility: HOSPITAL | Age: 80
Discharge: HOME | End: 2025-03-19
Attending: EMERGENCY MEDICINE
Payer: MEDICARE

## 2025-03-19 VITALS
HEART RATE: 66 BPM | SYSTOLIC BLOOD PRESSURE: 186 MMHG | BODY MASS INDEX: 38.41 KG/M2 | RESPIRATION RATE: 16 BRPM | HEIGHT: 64 IN | TEMPERATURE: 99 F | DIASTOLIC BLOOD PRESSURE: 89 MMHG | OXYGEN SATURATION: 99 % | WEIGHT: 225 LBS

## 2025-03-19 DIAGNOSIS — M47.26 OSTEOARTHRITIS OF SPINE WITH RADICULOPATHY, LUMBAR REGION: ICD-10-CM

## 2025-03-19 DIAGNOSIS — M54.50 LUMBAR PAIN: Primary | ICD-10-CM

## 2025-03-19 LAB
ALBUMIN SERPL BCP-MCNC: 4 G/DL (ref 3.5–5.2)
ALP SERPL-CCNC: 76 U/L (ref 40–150)
ALT SERPL W/O P-5'-P-CCNC: 22 U/L (ref 10–44)
ANION GAP SERPL CALC-SCNC: 9 MMOL/L (ref 8–16)
AST SERPL-CCNC: 25 U/L (ref 10–40)
BASOPHILS # BLD AUTO: 0.03 K/UL (ref 0–0.2)
BASOPHILS NFR BLD: 0.5 % (ref 0–1.9)
BILIRUB SERPL-MCNC: 0.6 MG/DL (ref 0.1–1)
BILIRUB UR QL STRIP: NEGATIVE
BUN SERPL-MCNC: 16 MG/DL (ref 8–23)
CALCIUM SERPL-MCNC: 9.6 MG/DL (ref 8.7–10.5)
CHLORIDE SERPL-SCNC: 105 MMOL/L (ref 95–110)
CLARITY UR: CLEAR
CO2 SERPL-SCNC: 30 MMOL/L (ref 23–29)
COLOR UR: COLORLESS
CREAT SERPL-MCNC: 0.9 MG/DL (ref 0.5–1.4)
DIFFERENTIAL METHOD BLD: ABNORMAL
EOSINOPHIL # BLD AUTO: 0.2 K/UL (ref 0–0.5)
EOSINOPHIL NFR BLD: 3.8 % (ref 0–8)
ERYTHROCYTE [DISTWIDTH] IN BLOOD BY AUTOMATED COUNT: 13.1 % (ref 11.5–14.5)
EST. GFR  (NO RACE VARIABLE): >60 ML/MIN/1.73 M^2
GLUCOSE SERPL-MCNC: 101 MG/DL (ref 70–110)
GLUCOSE UR QL STRIP: NEGATIVE
HCT VFR BLD AUTO: 42.1 % (ref 37–48.5)
HGB BLD-MCNC: 13.1 G/DL (ref 12–16)
HGB UR QL STRIP: NEGATIVE
IMM GRANULOCYTES # BLD AUTO: 0.01 K/UL (ref 0–0.04)
IMM GRANULOCYTES NFR BLD AUTO: 0.2 % (ref 0–0.5)
KETONES UR QL STRIP: NEGATIVE
LEUKOCYTE ESTERASE UR QL STRIP: NEGATIVE
LYMPHOCYTES # BLD AUTO: 2.2 K/UL (ref 1–4.8)
LYMPHOCYTES NFR BLD: 38.7 % (ref 18–48)
MCH RBC QN AUTO: 31 PG (ref 27–31)
MCHC RBC AUTO-ENTMCNC: 31.1 G/DL (ref 32–36)
MCV RBC AUTO: 100 FL (ref 82–98)
MONOCYTES # BLD AUTO: 0.5 K/UL (ref 0.3–1)
MONOCYTES NFR BLD: 9.4 % (ref 4–15)
NEUTROPHILS # BLD AUTO: 2.6 K/UL (ref 1.8–7.7)
NEUTROPHILS NFR BLD: 47.4 % (ref 38–73)
NITRITE UR QL STRIP: NEGATIVE
NRBC BLD-RTO: 0 /100 WBC
PH UR STRIP: 7 [PH] (ref 5–8)
PLATELET # BLD AUTO: 236 K/UL (ref 150–450)
PMV BLD AUTO: 11.1 FL (ref 9.2–12.9)
POTASSIUM SERPL-SCNC: 4.1 MMOL/L (ref 3.5–5.1)
PROT SERPL-MCNC: 8.4 G/DL (ref 6–8.4)
PROT UR QL STRIP: NEGATIVE
RBC # BLD AUTO: 4.23 M/UL (ref 4–5.4)
SODIUM SERPL-SCNC: 144 MMOL/L (ref 136–145)
SP GR UR STRIP: 1 (ref 1–1.03)
URN SPEC COLLECT METH UR: ABNORMAL
UROBILINOGEN UR STRIP-ACNC: NEGATIVE EU/DL
WBC # BLD AUTO: 5.55 K/UL (ref 3.9–12.7)

## 2025-03-19 PROCEDURE — 80053 COMPREHEN METABOLIC PANEL: CPT | Performed by: PHYSICIAN ASSISTANT

## 2025-03-19 PROCEDURE — 85025 COMPLETE CBC W/AUTO DIFF WBC: CPT | Performed by: PHYSICIAN ASSISTANT

## 2025-03-19 PROCEDURE — 25000003 PHARM REV CODE 250: Performed by: PHYSICIAN ASSISTANT

## 2025-03-19 PROCEDURE — 81003 URINALYSIS AUTO W/O SCOPE: CPT | Performed by: PHYSICIAN ASSISTANT

## 2025-03-19 PROCEDURE — 36415 COLL VENOUS BLD VENIPUNCTURE: CPT | Performed by: PHYSICIAN ASSISTANT

## 2025-03-19 PROCEDURE — 99284 EMERGENCY DEPT VISIT MOD MDM: CPT | Mod: 25

## 2025-03-19 PROCEDURE — 63600175 PHARM REV CODE 636 W HCPCS: Performed by: PHYSICIAN ASSISTANT

## 2025-03-19 PROCEDURE — 96372 THER/PROPH/DIAG INJ SC/IM: CPT | Performed by: PHYSICIAN ASSISTANT

## 2025-03-19 RX ORDER — HYDROCODONE BITARTRATE AND ACETAMINOPHEN 5; 325 MG/1; MG/1
1 TABLET ORAL EVERY 6 HOURS PRN
Qty: 12 TABLET | Refills: 0 | Status: SHIPPED | OUTPATIENT
Start: 2025-03-19 | End: 2025-03-22

## 2025-03-19 RX ORDER — MORPHINE SULFATE 4 MG/ML
4 INJECTION, SOLUTION INTRAMUSCULAR; INTRAVENOUS
Status: COMPLETED | OUTPATIENT
Start: 2025-03-19 | End: 2025-03-19

## 2025-03-19 RX ORDER — LIDOCAINE 50 MG/G
1 PATCH TOPICAL DAILY
Qty: 30 PATCH | Refills: 0 | Status: SHIPPED | OUTPATIENT
Start: 2025-03-19

## 2025-03-19 RX ORDER — LIDOCAINE 50 MG/G
1 PATCH TOPICAL ONCE
Status: DISCONTINUED | OUTPATIENT
Start: 2025-03-19 | End: 2025-03-19 | Stop reason: HOSPADM

## 2025-03-19 RX ADMIN — MORPHINE SULFATE 4 MG: 4 INJECTION INTRAVENOUS at 01:03

## 2025-03-19 RX ADMIN — LIDOCAINE 1 PATCH: 50 PATCH TOPICAL at 01:03

## 2025-03-19 NOTE — ED PROVIDER NOTES
Encounter Date: 3/19/2025       History     Chief Complaint   Patient presents with    Back Pain     Chronic back pain. Hx of scoliosis. Battles back pain daily. Today it is worse than normal     Tierra Taylor is a 79 y.o. female presenting for evaluation of right-sided lower back pain, extending into her right buttocks and down the back of her right leg, persistent for the last few days.  No injury, trauma or fall.  No fever, no chills.  No loss of bowel or bladder control.  No numbness, tingling or weakness in the leg.  She has not had these symptoms previously.  She has not taken any medication for his symptoms.  She is concerned about possible kidney infection.  She has a past medical history of Arthritis, Hypertension, and Sleep apnea.      The history is provided by the patient.     Review of patient's allergies indicates:   Allergen Reactions    Lisinopril Other (See Comments)     Cough    Penicillins      swelling    Tramadol      causes itching     Past Medical History:   Diagnosis Date    Arthritis     Hypertension     Sleep apnea     doesn't wear CPAP     Past Surgical History:   Procedure Laterality Date    BREAST BIOPSY      CERVICAL DISCECTOMY      FATTY TUMOR      HYSTERECTOMY      KNEE SURGERY       Family History   Problem Relation Name Age of Onset    Hypertension Mother      Hypertension Father       Social History[1]  Review of Systems   Constitutional:  Negative for chills and fever.   Respiratory:  Negative for cough, chest tightness, shortness of breath and wheezing.    Cardiovascular:  Negative for chest pain and palpitations.   Musculoskeletal:  Positive for arthralgias, back pain and myalgias. Negative for joint swelling, neck pain and neck stiffness.   Skin:  Negative for color change, pallor, rash and wound.   Neurological:  Negative for weakness and numbness.   Hematological:  Does not bruise/bleed easily.       Physical Exam     Initial Vitals [03/19/25 1159]   BP Pulse Resp Temp  SpO2   (!) 219/85 75 16 98.5 °F (36.9 °C) 99 %      MAP       --         Physical Exam    Nursing note and vitals reviewed.  Constitutional: She appears well-developed and well-nourished. She is not diaphoretic. No distress.   HENT:   Head: Normocephalic and atraumatic.   Neck: Neck supple.   Normal range of motion.  Cardiovascular:  Normal rate, regular rhythm, normal heart sounds and intact distal pulses.     Exam reveals no gallop and no friction rub.       No murmur heard.  Pulmonary/Chest: Breath sounds normal. No respiratory distress. She has no wheezes. She has no rhonchi. She has no rales.   Abdominal: Abdomen is soft. She exhibits no distension and no mass. There is no abdominal tenderness.   Musculoskeletal:         General: Tenderness present. No edema. Normal range of motion.      Cervical back: Normal range of motion and neck supple.      Comments: TTP noted to midline lumbar spinous processes, extending into right lumbar paraspinal muscles and into right buttocks.  No decreased range of motion, decreased strength or loss of sensation to bilateral lower extremities.  Palpable 2+ pedal pulses.      Neurological: She is alert and oriented to person, place, and time. She has normal strength. No sensory deficit.   Skin: Skin is warm and dry. No rash and no abscess noted. No erythema.   Psychiatric: She has a normal mood and affect.         ED Course   Procedures  Labs Reviewed   CBC W/ AUTO DIFFERENTIAL - Abnormal       Result Value    WBC 5.55      RBC 4.23      Hemoglobin 13.1      Hematocrit 42.1       (*)     MCH 31.0      MCHC 31.1 (*)     RDW 13.1      Platelets 236      MPV 11.1      Immature Granulocytes 0.2      Gran # (ANC) 2.6      Immature Grans (Abs) 0.01      Lymph # 2.2      Mono # 0.5      Eos # 0.2      Baso # 0.03      nRBC 0      Gran % 47.4      Lymph % 38.7      Mono % 9.4      Eosinophil % 3.8      Basophil % 0.5      Differential Method Automated      Narrative:     Collection  has been rescheduled by ACD at 03/19/2025 13:00 Reason:   Patient unavailable went to get x ray   COMPREHENSIVE METABOLIC PANEL - Abnormal    Sodium 144      Potassium 4.1      Chloride 105      CO2 30 (*)     Glucose 101      BUN 16      Creatinine 0.9      Calcium 9.6      Total Protein 8.4      Albumin 4.0      Total Bilirubin 0.6      Alkaline Phosphatase 76      AST 25      ALT 22      eGFR >60      Anion Gap 9      Narrative:     Collection has been rescheduled by ACD at 03/19/2025 13:00 Reason:   Patient unavailable went to get x ray   URINALYSIS, REFLEX TO URINE CULTURE - Abnormal    Specimen UA Urine, Clean Catch      Color, UA Colorless (*)     Appearance, UA Clear      pH, UA 7.0      Specific Gravity, UA 1.005      Protein, UA Negative      Glucose, UA Negative      Ketones, UA Negative      Bilirubin (UA) Negative      Occult Blood UA Negative      Nitrite, UA Negative      Urobilinogen, UA Negative      Leukocytes, UA Negative      Narrative:     Specimen Source->Urine          Imaging Results              X-Ray Lumbar Spine Ap And Lateral (Final result)  Result time 03/19/25 13:08:05      Final result by Shane Leija MD (03/19/25 13:08:05)                   Impression:      As above.      Electronically signed by: Shane Leija  Date:    03/19/2025  Time:    13:08               Narrative:    EXAMINATION:  XR LUMBAR SPINE AP AND LATERAL    CLINICAL HISTORY:  lumbar pain with radiculopathy into right lower extremity;    TECHNIQUE:  AP, lateral and spot images were performed of the lumbar spine.    COMPARISON:  Lumbar spine radiographs 01/04/2024    FINDINGS:  Vertebral bodies maintain normal height and alignment.  No fracture or abnormal subluxation.  Advanced multilevel disc degeneration with moderate to severe intervertebral disc space narrowing, degenerative endplate changes and anterior marginal osteophyte formation at every visualized thoracolumbar level, most pronounced at L4-5 and L5-S1.   Scattered vacuum disc phenomenon.  Moderate lower lumbar facet arthropathy.  Sacroiliac joints are maintained.                                       Medications   LIDOcaine 5 % patch 1 patch (1 patch Transdermal Patch Applied 3/19/25 1302)   morphine injection 4 mg (4 mg Intramuscular Given 3/19/25 1302)     Medical Decision Making  Differential Diagnosis:   Osteoarthritis   Cauda Equina  Epidural Abscess  Radiculopathy     Pt emergently evaluated here in the ED.    X-ray imaging shows evidence for degenerative changes of lumbar spine, likely contributing to her symptoms.  Normal renal function.  Urinalysis negative for infection.  She has noticed symptomatic improvement with medications given here in the emergency department.  Low suspicion for cauda equina or epidural abscess and no indication for emergent advanced imaging today.  We feel comfortable discharging her home to follow up with PMR for re-evaluation and further management.  She voices understanding is agreeable with the plan.  She is given specific return precautions.    Amount and/or Complexity of Data Reviewed  Labs: ordered. Decision-making details documented in ED Course.  Radiology: ordered. Decision-making details documented in ED Course.    Risk  OTC drugs.  Prescription drug management.              Attending Attestation:     Physician Attestation Statement for NP/PA:       Other NP/PA Attestation Additions:    History of Present Illness: 79-year-old female presents for lower back pain.    Medical Decision Making: Initial differential diagnosis included but not limited to arthritis, sciatica, and musculoskeletal pain.  I am in agreement with the physician assistant's  assessment, treatment, and plan of care.                                    Clinical Impression:  Final diagnoses:  [M54.50] Lumbar pain (Primary)  [M47.26] Osteoarthritis of spine with radiculopathy, lumbar region          ED Disposition Condition    Discharge Stable          ED  Prescriptions       Medication Sig Dispense Start Date End Date Auth. Provider    LIDOcaine (LIDODERM) 5 % Place 1 patch onto the skin once daily. Remove & Discard patch within 12 hours or as directed by MD 30 patch 3/19/2025 -- Marilee Aleman PA-C    HYDROcodone-acetaminophen (NORCO) 5-325 mg per tablet Take 1 tablet by mouth every 6 (six) hours as needed for Pain. 12 tablet 3/19/2025 3/22/2025 Marilee Almean PA-C          Follow-up Information       Follow up With Specialties Details Why Contact Info Additional Information    [facility]  As needed, If symptoms worsen [address] [place] [address] [address] [place] [id number] 1st floor    Lincoln Castro MD Internal Medicine  As needed [address] [place] [address] [address] [place] [address]  [phone]                Marilee Aleman PA-C  03/19/25 1634         [1]   Social History  Tobacco Use    Smoking status: Never    Smokeless tobacco: Never   Substance Use Topics    Alcohol use: No    Drug use: No        Moises Lujan MD  03/19/25 [id number]

## 2025-03-19 NOTE — ASSESSMENT & PLAN NOTE
Breath sounds are clear, denies fluid retention.  No edema on exam  Continue valsartan, Lasix, Coreg

## 2025-03-19 NOTE — ASSESSMENT & PLAN NOTE
I discussed this with patient and her daughters on speaker phone.  Patient is already scheduled for PET stress test next week.  We discussed possibility of angiogram if this test is abnormal.  We also discussed she needs to go to the emergency room for any progressive or worsening symptoms.

## 2025-03-19 NOTE — PROGRESS NOTES
" Subjective:    Patient ID:  Tierra Taylor is a 79 y.o. female patient here for evaluation Results and Follow-up ( F/u after test scan completed  no issues stated by pt )    History of Present Illness:       Patient Is in clinic for 1 month follow up, alone today  but her daughters are on speaker phone.  She is here to discuss stress test results.  She continues to have shortness of breath and feeling tired.  Symptoms are alleviated with rest.    Office visit notes from 2/17/25:  "Patient is 79-year-old female in clinic today with her daughter  She has been followed by Dr. JULISSA Hernandez.     Daughter states main reason she is here is regarding Dr Castro did a "CAD" score, not a calcium score says  Score showed elevation in number over 2 yr period     Pt states her "BP never normal"; Usually around 170-190; does miss some dosages she admits;   Looks she is only taking hydralazine once daily  Reportedly she had normal labs at outside labs     Pt reports she is experiencing shortness of breath and fatigue;   These symptoms dont occur at rest but on exertion  She has to rest and symptoms improve  Denies chest pain, flutters, PND, orthopnea or fluid retention     Has MARIA R, can't tolerate CPAP  Never smoked; no ETOH  Lives alone; uses cane"    Most Recent Echocardiogram Results  Results for orders placed during the hospital encounter of 02/25/25    Echo    Interpretation Summary    Left Ventricle: The left ventricle is normal in size. Moderately increased wall thickness. There is moderate concentric hypertrophy. Normal wall motion. There is normal systolic function with a visually estimated ejection fraction of 55 - 70%. There is normal diastolic function.    Right Ventricle: The right ventricle is normal in size. Wall thickness is normal. Systolic function is normal.    Left Atrium: severely dilated    Pulmonary Artery: The estimated pulmonary artery systolic pressure is 22 mmHg.    IVC/SVC: Normal venous pressure at 3 " mmHg.      Most Recent Nuclear Stress Test Results  Results for orders placed during the hospital encounter of 02/25/25    Nuclear Stress - Cardiology Interpreted    Interpretation Summary    Abnormal myocardial perfusion scan.  This has no conclusive evidence of any significant reversible ischemia    There are two significant perfusion abnormalities.    Perfusion Abnormality #1 - There is a moderate to severe intensity, medium sized, equivocal perfusion abnormality that is fixed in the mid to apical anteroapical wall(s). This finding is equivocal due to soft tissue shadow.    Perfusion Abnormality #2 - There is a medium sized, mild to moderate intensity, fixed perfusion abnormality consistent with scar in the basal to mid lateral wall(s).    There are no other significant perfusion abnormalities.    The gated perfusion images showed an ejection fraction of 69% at rest. The gated perfusion images showed an ejection fraction of 64% post stress. Normal ejection fraction is greater than 53%.    There is normal wall motion at rest and post-stress.    LV cavity size is normal at rest and normal at post-stress.    The ECG portion of the study is negative for ischemia.    The patient reported no chest pain during the stress test.    During stress, occasional PVCs are noted.      Most Recent Cardiac PET Stress Test Results  No results found for this or any previous visit.      Most Recent Cardiovascular Angiogram results  No results found for this or any previous visit.      Other Most Recent Cardiology Results  No results found for this or any previous visit.      REVIEW OF SYSTEMS: As noted in HPI       Past Medical History:   Diagnosis Date    Arthritis     Hypertension     Sleep apnea     doesn't wear CPAP     Past Surgical History:   Procedure Laterality Date    BREAST BIOPSY      CERVICAL DISCECTOMY      FATTY TUMOR      HYSTERECTOMY      KNEE SURGERY       Social History[1]      Objective      Vitals:    03/18/25  "1311   BP: (!) 191/95   Pulse: 73       LAST EKG  Results for orders placed or performed in visit on 02/17/25   IN OFFICE EKG 12-LEAD (to [place])    Collection Time: 02/17/25  8:51 AM   Result Value Ref Range    QRS Duration 100 ms    [facility] QTC Calculation 464 ms    Narrative    Test Reason : R06.02,    Vent. Rate :  73 BPM     Atrial Rate :  73 BPM     P-R Int : 182 ms          QRS Dur : 100 ms      QT Int : 422 ms       P-R-T Axes :  58 -18  42 degrees    QTcB Int : 464 ms    Normal sinus rhythm  Possible Left atrial enlargement  Borderline Abnormal ECG  Confirmed by Cristina Walsh ([id number]) on 2/19/2025 5:39:33 PM    Referred By:            Confirmed By: Cristina Walsh     LIPIDS - LAST 2   Lab Results   Component Value Date    CHOL 129 11/27/2020    CHOL 124 03/25/2020    HDL 55 11/27/2020    HDL 58 03/25/2020    LDLCALC 58 11/27/2020    LDLCALC 52 03/25/2020    TRIG 81 11/27/2020    TRIG 59 03/25/2020    CHOLHDL 2.3 11/27/2020    CHOLHDL 2.1 03/25/2020     CARDIAC PROFILE - LAST 2  No results found for: "BNP", "CPK", "CPKMB", "LDH", "TROPONINI"   CBC - LAST 2  Lab Results   Component Value Date    WBC 5.98 04/04/2022    WBC 5.9 11/27/2020    RBC 4.34 04/04/2022    RBC 4.25 11/27/2020    HGB 13.2 04/04/2022    HGB 12.8 11/27/2020    HCT 42.6 04/04/2022    HCT 40.6 11/27/2020     04/04/2022     11/27/2020     Lab Results   Component Value Date    INR 1.01 04/28/2013     CHEMISTRY - LAST 2  Lab Results   Component Value Date     04/04/2022     11/30/2021    K 3.8 04/04/2022    K 3.7 11/30/2021     04/04/2022     11/30/2021    CO2 27 04/04/2022    CO2 34 (H) 11/30/2021    ANIONGAP 13 04/04/2022    ANIONGAP 8 08/12/2019    BUN 8 04/04/2022    BUN 18 11/30/2021    CREATININE 0.8 04/04/2022    CREATININE 0.72 11/30/2021     (H) 04/04/2022    GLU 96 11/30/2021    CALCIUM 9.5 04/04/2022    CALCIUM 9.6 11/30/2021    ALBUMIN 3.8 04/04/2022    ALBUMIN 4.0 11/30/2021    " PROT 8.0 04/04/2022    PROT 7.0 11/30/2021    ALKPHOS 69 04/04/2022    ALKPHOS 74 03/25/2020    ALT 25 04/04/2022    ALT 18 11/30/2021    AST 23 04/04/2022    AST 19 11/30/2021    BILITOT 0.9 04/04/2022    BILITOT 1.0 11/30/2021      ENDOCRINE - LAST 2  Lab Results   Component Value Date    HGBA1C 5.4 11/30/2021        PHYSICAL EXAM  CONSTITUTIONAL:  Elderly female breathing comfortably on room air in no apparent distress  NECK: no carotid bruit, no JVD  LUNGS: CTA  CHEST WALL: no tenderness  HEART: regular rate and rhythm, S1, S2 normal, no murmur  ABDOMEN: soft, non-tender; bowel sounds normal; no masses  EXTREMITIES: Extremities normal, no edema, no calf tenderness noted  NEURO: AAO X 3, speech clear, memory clear    I HAVE REVIEWED :    The vital signs, most recent cardiac testing, and most recent pertinent non-cardiology provider notes.    Current Outpatient Medications   Medication Instructions    allopurinoL (ZYLOPRIM) 100 mg, Oral, Daily    amLODIPine (NORVASC) 2.5 mg, Oral, Daily    aspirin 81 mg, Nightly    calcium-vitamin D 600 mg(1,500mg) -400 unit Tab 1 tablet, 2 times daily    carvediloL (COREG) 12.5 mg, Oral, 2 times daily    fluticasone propionate (FLONASE) 50 mcg/actuation nasal spray 1 spray, Daily    furosemide (LASIX) 40 mg    hydrALAZINE (APRESOLINE) 25 mg, Oral, 3 times daily    rosuvastatin (CRESTOR) 20 mg, Oral, Nightly    valsartan (DIOVAN) 160 MG tablet 2 times daily      Assessment & Plan     Abnormal nuclear stress test  I discussed this with patient and her daughters on speaker phone.  Patient is already scheduled for PET stress test next week.  We discussed possibility of angiogram if this test is abnormal.  We also discussed she needs to go to the emergency room for any progressive or worsening symptoms.    Chronic diastolic heart failure  Breath sounds are clear, denies fluid retention.  No edema on exam  Continue valsartan, Lasix, Coreg    Essential hypertension  BP remains high.   191/95 in the office today.  Patient and daughter states it is usually lower at home around 170.  Increase amlodipine to 5 mg daily, continue Coreg 12.5 mg b.i.d. and valsartan 160 mg b.i.d..  Continue hydralazine 25 mg t.i.d..  Low-sodium diet restrictions.  Obtain renal artery ultrasound.    Mixed hyperlipidemia  Continue rosuvastatin.  We need to get lab work from outside facility.        Follow up after PET stress next week             DIETER Chao                 [1]   Social History  Tobacco Use    Smoking status: Never    Smokeless tobacco: Never   Substance Use Topics    Alcohol use: No    Drug use: No

## 2025-03-19 NOTE — ASSESSMENT & PLAN NOTE
BP remains high.  191/95 in the office today.  Patient and daughter states it is usually lower at home around 170.  Increase amlodipine to 5 mg daily, continue Coreg 12.5 mg b.i.d. and valsartan 160 mg b.i.d..  Continue hydralazine 25 mg t.i.d..  Low-sodium diet restrictions.  Obtain renal artery ultrasound.

## 2025-03-21 ENCOUNTER — HOSPITAL ENCOUNTER (OUTPATIENT)
Dept: RADIOLOGY | Facility: HOSPITAL | Age: 80
Discharge: HOME | End: 2025-03-21
Attending: NURSE PRACTITIONER
Payer: MEDICARE

## 2025-03-21 ENCOUNTER — RESULTS FOLLOW-UP (OUTPATIENT)
Dept: CARDIOLOGY | Facility: CLINIC | Age: 80
End: 2025-03-21

## 2025-03-21 DIAGNOSIS — I10 UNCONTROLLED HYPERTENSION: ICD-10-CM

## 2025-03-21 PROCEDURE — 93975 VASCULAR STUDY: CPT | Mod: 26,,, | Performed by: RADIOLOGY

## 2025-03-21 PROCEDURE — 76770 US EXAM ABDO BACK WALL COMP: CPT | Mod: 26,59,, | Performed by: RADIOLOGY

## 2025-03-21 PROCEDURE — 76770 US EXAM ABDO BACK WALL COMP: CPT | Mod: TC,PO

## 2025-03-24 ENCOUNTER — HOSPITAL ENCOUNTER (OUTPATIENT)
Dept: CARDIOLOGY | Facility: HOSPITAL | Age: 80
Discharge: HOME | End: 2025-03-24
Attending: INTERNAL MEDICINE
Payer: MEDICARE

## 2025-03-24 VITALS
HEIGHT: 64 IN | WEIGHT: 218 LBS | SYSTOLIC BLOOD PRESSURE: 150 MMHG | HEART RATE: 87 BPM | BODY MASS INDEX: 37.22 KG/M2 | DIASTOLIC BLOOD PRESSURE: 64 MMHG

## 2025-03-24 DIAGNOSIS — R94.39 ABNORMAL CARDIOVASCULAR STRESS TEST: ICD-10-CM

## 2025-03-24 DIAGNOSIS — R93.1 ABNORMAL FINDINGS ON DIAGNOSTIC IMAGING OF HEART AND CORONARY CIRCULATION: ICD-10-CM

## 2025-03-24 LAB
CFR FLOW - ANTERIOR: 1.69
CFR FLOW - INFERIOR: 1.67
CFR FLOW - LATERAL: 1.61
CFR FLOW - MAX: 2.15
CFR FLOW - MIN: 1.25
CFR FLOW - SEPTAL: 1.66
CFR FLOW - WHOLE HEART: 1.66
CV STRESS BASE HR: 75 BPM
DIASTOLIC BLOOD PRESSURE: 94 MMHG
EJECTION FRACTION- HIGH: 59 %
END DIASTOLIC INDEX-HIGH: 155 ML/M2
END DIASTOLIC INDEX-LOW: 91 ML/M2
END SYSTOLIC INDEX-HIGH: 78 ML/M2
END SYSTOLIC INDEX-LOW: 40 ML/M2
NUC REST DIASTOLIC VOLUME INDEX: 104
NUC REST EJECTION FRACTION: 64
NUC REST SYSTOLIC VOLUME INDEX: 37
NUC STRESS DIASTOLIC VOLUME INDEX: 115
NUC STRESS EJECTION FRACTION: 69 %
NUC STRESS SYSTOLIC VOLUME INDEX: 35
OHS CV CPX 1 MINUTE RECOVERY HEART RATE: 92 BPM
OHS CV CPX 85 PERCENT MAX PREDICTED HEART RATE MALE: 118
OHS CV CPX MAX PREDICTED HEART RATE: 139
OHS CV CPX PATIENT IS FEMALE: 1
OHS CV CPX PATIENT IS MALE: 0
OHS CV CPX PEAK HEAR RATE: 81 BPM
OHS CV CPX PERCENT MAX PREDICTED HEART RATE ACHIEVED: 60
OHS CV CPX RATE PRESSURE PRODUCT PRESENTING: NORMAL
OHS CV INITIAL DOSE: 30.7 MCG/KG/MIN
OHS CV MODERATELY REDUCED FLOW CAPACITY: 0 %
OHS CV MYOCARDIAL STEAL: 0 %
OHS CV NO ISCHEMIA MILDLY REDUCED FLOW CAPACTY: 11 %
OHS CV NO ISCHEMIA MINIMALLY REDUCED FLOW CAPACITY: 37 %
OHS CV NORMAL FLOW CAPACITY COMPARABLE TO HEALTHY YOUNG VOLUNTEERS: 52 %
OHS CV PEAK DOSE: 30.7 MCG/KG/MIN
OHS CV PET ID: 8429
OHS CV PRE-DOMINANTLY MYOCARDIAL SCAR: 0 %
OHS CV SEVERELY REDUCED FLOW CAPACITY LARGEST SINGLE CONTINUOUS REGION: 0 %
OHS CV SEVERELY REDUCED FLOW CAPACITY: 0 %
OHS CV TOTAL EXAM DLP: 461.52 MGY-CM
REST FLOW - ANTERIOR: 1.46 CC/MIN/G
REST FLOW - INFERIOR: 1.65 CC/MIN/G
REST FLOW - LATERAL: 1.28 CC/MIN/G
REST FLOW - MAX: 1.91 CC/MIN/G
REST FLOW - MIN: 0.82 CC/MIN/G
REST FLOW - SEPTAL: 1.3 CC/MIN/G
REST FLOW - WHOLE HEART: 1.42 CC/MIN/G
RETIRED EF AND QEF - SEE NOTES: 47 %
STRESS FLOW - ANTERIOR: 2.43 CC/MIN/G
STRESS FLOW - INFERIOR: 2.75 CC/MIN/G
STRESS FLOW - LATERAL: 2.05 CC/MIN/G
STRESS FLOW - MAX: 3.24 CC/MIN/G
STRESS FLOW - MIN: 1.56 CC/MIN/G
STRESS FLOW - SEPTAL: 2.15 CC/MIN/G
STRESS FLOW - WHOLE HEART: 2.35 CC/MIN/G
SYSTOLIC BLOOD PRESSURE: 167 MMHG

## 2025-03-24 PROCEDURE — 78434 AQMBF PET REST & RX STRESS: CPT

## 2025-03-24 PROCEDURE — 93018 CV STRESS TEST I&R ONLY: CPT | Mod: ,,, | Performed by: INTERNAL MEDICINE

## 2025-03-24 PROCEDURE — 63600175 PHARM REV CODE 636 W HCPCS: Performed by: INTERNAL MEDICINE

## 2025-03-24 PROCEDURE — A9555 RB82 RUBIDIUM: HCPCS | Performed by: INTERNAL MEDICINE

## 2025-03-24 PROCEDURE — 78431 MYOCRD IMG PET RST&STRS CT: CPT | Mod: 26,,, | Performed by: INTERNAL MEDICINE

## 2025-03-24 PROCEDURE — 78434 AQMBF PET REST & RX STRESS: CPT | Mod: 26,,, | Performed by: INTERNAL MEDICINE

## 2025-03-24 PROCEDURE — 93016 CV STRESS TEST SUPVJ ONLY: CPT | Mod: ,,, | Performed by: INTERNAL MEDICINE

## 2025-03-24 RX ORDER — AMINOPHYLLINE 25 MG/ML
75 INJECTION, SOLUTION INTRAVENOUS
Status: COMPLETED | OUTPATIENT
Start: 2025-03-24 | End: 2025-03-24

## 2025-03-24 RX ORDER — REGADENOSON 0.08 MG/ML
0.4 INJECTION, SOLUTION INTRAVENOUS
Status: COMPLETED | OUTPATIENT
Start: 2025-03-24 | End: 2025-03-24

## 2025-03-24 RX ADMIN — AMINOPHYLLINE 75 MG: 25 INJECTION, SOLUTION INTRAVENOUS at 01:03

## 2025-03-24 RX ADMIN — REGADENOSON 0.4 MG: 0.08 INJECTION, SOLUTION INTRAVENOUS at 01:03

## 2025-03-24 RX ADMIN — RUBIDIUM CHLORIDE RB-82 30.7 MILLICURIE: 150 INJECTION, SOLUTION INTRAVENOUS at 01:03

## 2025-03-27 ENCOUNTER — TELEPHONE (OUTPATIENT)
Dept: CARDIOLOGY | Facility: CLINIC | Age: 80
End: 2025-03-27
Payer: MEDICARE

## 2025-03-27 ENCOUNTER — RESULTS FOLLOW-UP (OUTPATIENT)
Dept: CARDIOLOGY | Facility: CLINIC | Age: 80
End: 2025-03-27

## 2025-03-27 NOTE — TELEPHONE ENCOUNTER
----- Message from Alyssia Alberts NP sent at 3/27/2025  3:26 PM CDT -----  apt  ----- Message -----  From: Dakotah Oshea III, MD  Sent: 3/24/2025   3:05 PM CDT  To: Jaden Hernandez MD

## 2025-04-01 ENCOUNTER — OFFICE VISIT (OUTPATIENT)
Dept: PHYSICAL MEDICINE AND REHAB | Facility: CLINIC | Age: 80
End: 2025-04-01
Payer: MEDICARE

## 2025-04-01 VITALS
DIASTOLIC BLOOD PRESSURE: 92 MMHG | WEIGHT: 218.06 LBS | BODY MASS INDEX: 37.23 KG/M2 | HEART RATE: 76 BPM | SYSTOLIC BLOOD PRESSURE: 170 MMHG | HEIGHT: 64 IN

## 2025-04-01 DIAGNOSIS — M47.26 OSTEOARTHRITIS OF SPINE WITH RADICULOPATHY, LUMBAR REGION: ICD-10-CM

## 2025-04-01 DIAGNOSIS — M54.50 LUMBAR PAIN: ICD-10-CM

## 2025-04-01 PROCEDURE — 1101F PT FALLS ASSESS-DOCD LE1/YR: CPT | Mod: CPTII,S$GLB,, | Performed by: STUDENT IN AN ORGANIZED HEALTH CARE EDUCATION/TRAINING PROGRAM

## 2025-04-01 PROCEDURE — 99204 OFFICE O/P NEW MOD 45 MIN: CPT | Mod: S$GLB,,, | Performed by: STUDENT IN AN ORGANIZED HEALTH CARE EDUCATION/TRAINING PROGRAM

## 2025-04-01 PROCEDURE — 3077F SYST BP >= 140 MM HG: CPT | Mod: CPTII,S$GLB,, | Performed by: STUDENT IN AN ORGANIZED HEALTH CARE EDUCATION/TRAINING PROGRAM

## 2025-04-01 PROCEDURE — 1159F MED LIST DOCD IN RCRD: CPT | Mod: CPTII,S$GLB,, | Performed by: STUDENT IN AN ORGANIZED HEALTH CARE EDUCATION/TRAINING PROGRAM

## 2025-04-01 PROCEDURE — 1125F AMNT PAIN NOTED PAIN PRSNT: CPT | Mod: CPTII,S$GLB,, | Performed by: STUDENT IN AN ORGANIZED HEALTH CARE EDUCATION/TRAINING PROGRAM

## 2025-04-01 PROCEDURE — 3288F FALL RISK ASSESSMENT DOCD: CPT | Mod: CPTII,S$GLB,, | Performed by: STUDENT IN AN ORGANIZED HEALTH CARE EDUCATION/TRAINING PROGRAM

## 2025-04-01 PROCEDURE — 99999 PR PBB SHADOW E&M-EST. PATIENT-LVL IV: CPT | Mod: PBBFAC,,, | Performed by: STUDENT IN AN ORGANIZED HEALTH CARE EDUCATION/TRAINING PROGRAM

## 2025-04-01 PROCEDURE — 3080F DIAST BP >= 90 MM HG: CPT | Mod: CPTII,S$GLB,, | Performed by: STUDENT IN AN ORGANIZED HEALTH CARE EDUCATION/TRAINING PROGRAM

## 2025-04-01 RX ORDER — GABAPENTIN 100 MG/1
100 CAPSULE ORAL 3 TIMES DAILY
Qty: 90 CAPSULE | Refills: 2 | Status: SHIPPED | OUTPATIENT
Start: 2025-04-01 | End: 2025-06-30

## 2025-04-01 RX ORDER — MELOXICAM 15 MG/1
15 TABLET ORAL DAILY PRN
Qty: 30 TABLET | Refills: 2 | Status: SHIPPED | OUTPATIENT
Start: 2025-04-01

## 2025-04-01 NOTE — PROGRESS NOTES
PHYSICAL MEDICINE AND REHABILITATION  New Patient Consult:    Subjective:   Chief Complaint:    Chief Complaint   Patient presents with    Back Pain     HPI: Tierra Taylor is a 79 y.o. female with  has a past medical history of Arthritis, Hypertension, and Sleep apnea. She was sent to me for consultation for Back Pain  She reports chronic low back pain that is right-sided and extends down to her right buttocks and down the back of her right thigh.  Pain began in proximally 2 weeks ago prompting her to go to the emergency room on 03/19/2025.  She was given a morphine injection and a short course of Norco 5/325 as well as lidocaine patches at that visit.  Workup was essentially unremarkable with the exception of x-rays of the lumbar spine showing advanced multilevel disc degeneration with moderate to severe intervertebral disc space narrowing, degenerative endplate changes and anterior marginal osteophytes.  She was discharged home without complication.  Today, she recalls a history of chronic low back pain that waxes and wanes. She recalls the most recent injury starting a couple of weeks ago after bending forward to clean. Denies any b/b/w. Aleve provides minimal relief. She is having trouble sleeping as a result of her pain. No recent visits to PT for her back pain.     Review of Systems  Per HPI    Imaging/Diagnostic Studies  XR LUMBAR SPINE AP AND LATERAL     CLINICAL HISTORY:  lumbar pain with radiculopathy into right lower extremity;     TECHNIQUE:  AP, lateral and spot images were performed of the lumbar spine.     COMPARISON:  Lumbar spine radiographs 01/04/2024     FINDINGS:  Vertebral bodies maintain normal height and alignment.  No fracture or abnormal subluxation.  Advanced multilevel disc degeneration with moderate to severe intervertebral disc space narrowing, degenerative endplate changes and anterior marginal osteophyte formation at every visualized thoracolumbar level, most pronounced at L4-5  "and L5-S1.  Scattered vacuum disc phenomenon.  Moderate lower lumbar facet arthropathy.  Sacroiliac joints are maintained.     Impression:     As above.        Electronically signed by:Shane Leija  Date:                                            03/19/2025  ======  X-rays of the lumbar spine:  Narrative & Impression    5 views of the lumbar spine     Clinical history is low back pain     The lumbar spine is in satisfactory alignment. The vertebral bodies are of normal height.  There is multilevel disc space narrowing predominantly from L3 to S1. There is facet hypertrophy from L3 to S1. There are no pars defects. The paraspinous soft tissues are normal. The SI joints are symmetrical.     IMPRESSION: Multilevel degenerative disc disease most significant from L3 to S1 with facet hypertrophy     Electronically signed by:  Anay Duncan MD  01/04/2024 02:10 PM [facility] Workstation: [id number]       Past Medical History:   Diagnosis Date    Arthritis     Hypertension     Sleep apnea     doesn't wear CPAP       Past Surgical History:   Procedure Laterality Date    BREAST BIOPSY      CERVICAL DISCECTOMY      FATTY TUMOR      HYSTERECTOMY      KNEE SURGERY         Review of patient's allergies indicates:   Allergen Reactions    Lisinopril Other (See Comments)     Cough    Penicillins      swelling    Tramadol      causes itching       Current Medications[1]    Family History   Problem Relation Name Age of Onset    Hypertension Mother      Hypertension Father         Social History[2]      Objective:    BP (!) 170/92 (Patient Position: Sitting)   Pulse 76   Ht 5' 4" (1.626 m)   Wt 98.9 kg (218 lb 0.6 oz)   BMI 37.43 kg/m²   Physical Exam  Vitals and nursing note reviewed.   Constitutional:       Appearance: Normal appearance.   HENT:      Head: Normocephalic.   Eyes:      Extraocular Movements: Extraocular movements intact.   Cardiovascular:      Rate and Rhythm: Normal rate.      Pulses: Normal pulses.   Pulmonary:      " Effort: Pulmonary effort is normal.   Abdominal:      General: Abdomen is flat.   Skin:     General: Skin is warm and dry.   Neurological:      General: No focal deficit present.      Mental Status: She is alert and oriented to person, place, and time. Mental status is at baseline.   Psychiatric:         Mood and Affect: Mood normal.         Behavior: Behavior normal.         Thought Content: Thought content normal.        Back Exam     Tenderness   The patient is experiencing tenderness in the lumbar and sacroiliac.    Muscle Strength   The patient has normal back strength.    Reflexes   Patellar:  normal  Achilles:  normal    Other   Sensation: normal  Gait: antalgic   Erythema: no back redness  Scars: absent               Assessment:       ICD-10-CM ICD-9-CM    1. Lumbar pain  M54.50 724.2 Ambulatory referral/consult to Physical Medicine Rehab      meloxicam (MOBIC) 15 MG tablet      gabapentin (NEURONTIN) 100 MG capsule      Ambulatory Referral/Consult to Physical Therapy      2. Osteoarthritis of spine with radiculopathy, lumbar region  M47.26 721.3 Ambulatory referral/consult to Physical Medicine Rehab      meloxicam (MOBIC) 15 MG tablet      gabapentin (NEURONTIN) 100 MG capsule      Ambulatory Referral/Consult to Physical Therapy            Plan:   1. Lumbar pain  -     Ambulatory referral/consult to Physical Medicine Rehab  -     meloxicam (MOBIC) 15 MG tablet; Take 1 tablet (15 mg total) by mouth daily as needed for Pain.  Dispense: 30 tablet; Refill: 2  -     gabapentin (NEURONTIN) 100 MG capsule; Take 1 capsule (100 mg total) by mouth 3 (three) times daily.  Dispense: 90 capsule; Refill: 2  -     Ambulatory Referral/Consult to Physical Therapy; Future; Expected date: 04/08/2025    2. Osteoarthritis of spine with radiculopathy, lumbar region  Assessment & Plan:  Start Gabapentin 100mg TID  Change from aleve to meloxicam 15mg daily prn  PT eval and treat  Continue use of cane for fall risk  reduction  Continue prn norco for severe pain  RTC 2 months or prn.     Orders:  -     Ambulatory referral/consult to Physical Medicine Rehab  -     meloxicam (MOBIC) 15 MG tablet; Take 1 tablet (15 mg total) by mouth daily as needed for Pain.  Dispense: 30 tablet; Refill: 2  -     gabapentin (NEURONTIN) 100 MG capsule; Take 1 capsule (100 mg total) by mouth 3 (three) times daily.  Dispense: 90 capsule; Refill: 2  -     Ambulatory Referral/Consult to Physical Therapy; Future; Expected date: 04/08/2025           Jorge Day MD  Physical Medicine & Rehabilitation     Disclaimer:  This note may have been prepared using voice recognition software, it may have not been extensively proofed, as such there could be errors within the text such as sound alike errors.  Contact the author of this note for clarification.            [1]   Current Outpatient Medications   Medication Sig Dispense Refill    allopurinoL (ZYLOPRIM) 100 MG tablet Take 1 tablet (100 mg total) by mouth once daily. 90 tablet 1    amLODIPine (NORVASC) 2.5 MG tablet Take 1 tablet (2.5 mg total) by mouth once daily. 90 tablet 3    aspirin 81 MG Chew Take 81 mg by mouth every evening.       calcium-vitamin D 600 mg(1,500mg) -400 unit Tab Take 1 tablet by mouth 2 (two) times a day.      carvediloL (COREG) 12.5 MG tablet Take 1 tablet (12.5 mg total) by mouth 2 (two) times daily. 180 tablet 1    fluticasone propionate (FLONASE) 50 mcg/actuation nasal spray 1 spray by Each Nostril route once daily.      furosemide (LASIX) 40 MG tablet Take 40 mg by mouth.      hydrALAZINE (APRESOLINE) 25 MG tablet TAKE 1 TABLET BY MOUTH THREE TIMES DAILY 90 tablet 0    LIDOcaine (LIDODERM) 5 % Place 1 patch onto the skin once daily. Remove & Discard patch within 12 hours or as directed by MD 30 patch 0    rosuvastatin (CRESTOR) 20 MG tablet Take 1 tablet (20 mg total) by mouth every evening. 90 tablet 1    valsartan (DIOVAN) 160 MG tablet Take by mouth 2 (two) times  daily.      gabapentin (NEURONTIN) 100 MG capsule Take 1 capsule (100 mg total) by mouth 3 (three) times daily. 90 capsule 2    meloxicam (MOBIC) 15 MG tablet Take 1 tablet (15 mg total) by mouth daily as needed for Pain. 30 tablet 2     No current facility-administered medications for this visit.   [2]   Social History  Socioeconomic History    Marital status: Single   Tobacco Use    Smoking status: Never    Smokeless tobacco: Never   Substance and Sexual Activity    Alcohol use: No    Drug use: No    Sexual activity: Not Currently

## 2025-04-01 NOTE — ASSESSMENT & PLAN NOTE
Start Gabapentin 100mg TID  Change from aleve to meloxicam 15mg daily prn  PT eval and treat  Continue use of cane for fall risk reduction  Continue prn norco for severe pain  RTC 2 months or prn.

## 2025-04-15 ENCOUNTER — CLINICAL SUPPORT (OUTPATIENT)
Dept: REHABILITATION | Facility: HOSPITAL | Age: 80
End: 2025-04-15
Attending: STUDENT IN AN ORGANIZED HEALTH CARE EDUCATION/TRAINING PROGRAM
Payer: MEDICARE

## 2025-04-15 ENCOUNTER — OFFICE VISIT (OUTPATIENT)
Dept: CARDIOLOGY | Facility: CLINIC | Age: 80
End: 2025-04-15
Payer: MEDICARE

## 2025-04-15 VITALS
OXYGEN SATURATION: 95 % | WEIGHT: 218.13 LBS | HEIGHT: 64 IN | BODY MASS INDEX: 37.24 KG/M2 | SYSTOLIC BLOOD PRESSURE: 182 MMHG | DIASTOLIC BLOOD PRESSURE: 86 MMHG

## 2025-04-15 DIAGNOSIS — M54.50 LUMBAR PAIN: ICD-10-CM

## 2025-04-15 DIAGNOSIS — R06.09 DYSPNEA ON EXERTION: ICD-10-CM

## 2025-04-15 DIAGNOSIS — G47.33 OBSTRUCTIVE SLEEP APNEA: Primary | ICD-10-CM

## 2025-04-15 DIAGNOSIS — I50.32 CHRONIC DIASTOLIC HEART FAILURE: ICD-10-CM

## 2025-04-15 DIAGNOSIS — M47.26 OSTEOARTHRITIS OF SPINE WITH RADICULOPATHY, LUMBAR REGION: ICD-10-CM

## 2025-04-15 DIAGNOSIS — E66.01 SEVERE OBESITY: ICD-10-CM

## 2025-04-15 DIAGNOSIS — I10 ESSENTIAL HYPERTENSION: ICD-10-CM

## 2025-04-15 DIAGNOSIS — E78.2 MIXED HYPERLIPIDEMIA: ICD-10-CM

## 2025-04-15 PROCEDURE — 3077F SYST BP >= 140 MM HG: CPT | Mod: CPTII,S$GLB,,

## 2025-04-15 PROCEDURE — 97110 THERAPEUTIC EXERCISES: CPT | Mod: PO

## 2025-04-15 PROCEDURE — 99214 OFFICE O/P EST MOD 30 MIN: CPT | Mod: S$GLB,,,

## 2025-04-15 PROCEDURE — 3079F DIAST BP 80-89 MM HG: CPT | Mod: CPTII,S$GLB,,

## 2025-04-15 PROCEDURE — 1159F MED LIST DOCD IN RCRD: CPT | Mod: CPTII,S$GLB,,

## 2025-04-15 PROCEDURE — 99999 PR PBB SHADOW E&M-EST. PATIENT-LVL IV: CPT | Mod: PBBFAC,,,

## 2025-04-15 PROCEDURE — 1126F AMNT PAIN NOTED NONE PRSNT: CPT | Mod: CPTII,S$GLB,,

## 2025-04-15 PROCEDURE — 1101F PT FALLS ASSESS-DOCD LE1/YR: CPT | Mod: CPTII,S$GLB,,

## 2025-04-15 PROCEDURE — 1160F RVW MEDS BY RX/DR IN RCRD: CPT | Mod: CPTII,S$GLB,,

## 2025-04-15 PROCEDURE — 3288F FALL RISK ASSESSMENT DOCD: CPT | Mod: CPTII,S$GLB,,

## 2025-04-15 PROCEDURE — 97161 PT EVAL LOW COMPLEX 20 MIN: CPT | Mod: PO

## 2025-04-15 NOTE — PATIENT INSTRUCTIONS
PELVIC TILT: Posterior        Tighten abdominals, flatten low back. ___ reps per set, ___ sets per day, ___ days per week      Copyright © [employer]. All rights reserved.      Knee-to-Chest Stretch: Unilateral        With hand behind right knee, pull knee in to chest until a comfortable stretch is felt in lower back and buttocks. Keep back relaxed. Hold ____ seconds.  Repeat ____ times per set. Do ____ sets per session. Do ____ sessions per day.     [email or web address]://[email or web address]/126     Copyright © [employer]. All rights reserved.      Knee-to-Chest Stretch: Bilateral        With hands behind knees, pull both knees in to chest until a comfortable stretch is felt in lower back and buttocks. Keep back relaxed. Hold ____ seconds.  Repeat ____ times per set. Do ____ sets per session. Do ____ sessions per day.     [email or web address]://[employer]/128     Copyright © [employer]. All rights reserved.      On Elbows (Prone)        Rise up on elbows as high as possible, keeping hips on floor. Hold ____ seconds.  Repeat ____ times per set. Do ____ sets per session. Do ____ sessions per day.

## 2025-04-15 NOTE — PROGRESS NOTES
" Subjective:    Patient ID:  Tierra Taylor is a 79 y.o. female patient here for evaluation Follow-up (F/u after PETSCAN), Back Pain, and Claudication      History of Present Illness    Patient presents today for follow up. Home BP readings range from 138-150 systolic with most recent of 128/81. She has a history of elevated BP during office visits. Cardiac PET stress test at [address] demonstrated no evidence of ischemia, scarring, or stenosis with normal wall motion and stable EF. Echo in February revealed normal EF with appropriate relaxation and contraction, no acute valvular abnormalities, and a dilated left atrium. She has history of sleep apnea confirmed by prior sleep study but reports inability to tolerate CPAP mask therapy. She has arthritis in her spine causing back pain, currently managed with Meloxicam, Gabapentin, and lidocaine patches. She reports having "bad knees" and uses a pillow between her legs for comfort while sleeping.      ROS:  Cardiovascular: -chest pain, -palpitations, +lower extremity edema  Respiratory: -shortness of breath, +exertional dyspnea  Musculoskeletal: +joint pain, +limb pain, +back pain  Neurological: -dizziness                    Review of patient's allergies indicates:   Allergen Reactions    Lisinopril Other (See Comments)     Cough    Penicillins      swelling    Tramadol      causes itching       Past Medical History:   Diagnosis Date    Arthritis     Hypertension     Sleep apnea     doesn't wear CPAP     Past Surgical History:   Procedure Laterality Date    BREAST BIOPSY      CERVICAL DISCECTOMY      FATTY TUMOR      HYSTERECTOMY      KNEE SURGERY       Social History[1]                Objective        Vitals:    04/15/25 1602   BP: (!) 182/86       LIPIDS - LAST 2   Lab Results   Component Value Date    CHOL 129 11/27/2020    CHOL 124 03/25/2020    HDL 55 11/27/2020    HDL 58 03/25/2020    LDLCALC 58 11/27/2020    LDLCALC 52 03/25/2020    TRIG 81 11/27/2020    TRIG " "59 03/25/2020    CHOLHDL 2.3 11/27/2020    CHOLHDL 2.1 03/25/2020       CBC - LAST 2  Lab Results   Component Value Date    WBC 5.55 03/19/2025    WBC 5.98 04/04/2022    RBC 4.23 03/19/2025    RBC 4.34 04/04/2022    HGB 13.1 03/19/2025    HGB 13.2 04/04/2022    HCT 42.1 03/19/2025    HCT 42.6 04/04/2022     (H) 03/19/2025    MCV 98 04/04/2022    MCH 31.0 03/19/2025    MCH 30.4 04/04/2022    MCHC 31.1 (L) 03/19/2025    MCHC 31.0 (L) 04/04/2022    RDW 13.1 03/19/2025    RDW 13.1 04/04/2022     03/19/2025     04/04/2022    MPV 11.1 03/19/2025    MPV 11.0 04/04/2022    GRAN 2.6 03/19/2025    GRAN 47.4 03/19/2025    LYMPH 2.2 03/19/2025    LYMPH 38.7 03/19/2025    MONO 0.5 03/19/2025    MONO 9.4 03/19/2025    BASO 0.03 03/19/2025    BASO 0.04 04/04/2022    NRBC 0 03/19/2025    NRBC 0 04/04/2022       CHEMISTRY & LIVER FUNCTION - LAST 2  Lab Results   Component Value Date     03/19/2025     04/04/2022    K 4.1 03/19/2025    K 3.8 04/04/2022     03/19/2025     04/04/2022    CO2 30 (H) 03/19/2025    CO2 27 04/04/2022    ANIONGAP 9 03/19/2025    ANIONGAP 13 04/04/2022    BUN 16 03/19/2025    BUN 8 04/04/2022    CREATININE 0.9 03/19/2025    CREATININE 0.8 04/04/2022     03/19/2025     (H) 04/04/2022    CALCIUM 9.6 03/19/2025    CALCIUM 9.5 04/04/2022    ALBUMIN 4.0 03/19/2025    ALBUMIN 3.8 04/04/2022    PROT 8.4 03/19/2025    PROT 8.0 04/04/2022    ALKPHOS 76 03/19/2025    ALKPHOS 69 04/04/2022    ALT 22 03/19/2025    ALT 25 04/04/2022    AST 25 03/19/2025    AST 23 04/04/2022    BILITOT 0.6 03/19/2025    BILITOT 0.9 04/04/2022        CARDIAC PROFILE - LAST 2  No results found for: "BNP", "CPK", "CPKMB", "LDH", "TROPONINI", "TROPONINIHS"     COAGULATION - LAST 2  Lab Results   Component Value Date    INR 1.01 04/28/2013       ENDOCRINE & PSA - LAST 2  Lab Results   Component Value Date    HGBA1C 5.4 11/30/2021    MICROALBUR 3.7 11/27/2020    MICROALBUR 8.2 03/25/2020 "        ECHOCARDIOGRAM RESULTS  Results for orders placed during the hospital encounter of 02/25/25    Echo    Interpretation Summary    Left Ventricle: The left ventricle is normal in size. Moderately increased wall thickness. There is moderate concentric hypertrophy. Normal wall motion. There is normal systolic function with a visually estimated ejection fraction of 55 - 70%. There is normal diastolic function.    Right Ventricle: The right ventricle is normal in size. Wall thickness is normal. Systolic function is normal.    Left Atrium: severely dilated    Pulmonary Artery: The estimated pulmonary artery systolic pressure is 22 mmHg.    IVC/SVC: Normal venous pressure at 3 mmHg.      CURRENT/PREVIOUS VISIT EKG  Results for orders placed or performed in visit on 02/17/25   IN OFFICE EKG 12-LEAD (to [place])    Collection Time: 02/17/25  8:51 AM   Result Value Ref Range    QRS Duration 100 ms    [facility] QTC Calculation 464 ms    Narrative    Test Reason : R06.02,    Vent. Rate :  73 BPM     Atrial Rate :  73 BPM     P-R Int : 182 ms          QRS Dur : 100 ms      QT Int : 422 ms       P-R-T Axes :  58 -18  42 degrees    QTcB Int : 464 ms    Normal sinus rhythm  Possible Left atrial enlargement  Borderline Abnormal ECG  Confirmed by Cristina Walsh ([id number]) on 2/19/2025 5:39:33 PM    Referred By:            Confirmed By: Cristina Walsh     No valid procedures specified.   Results for orders placed during the hospital encounter of 02/25/25    Nuclear Stress - Cardiology Interpreted    Interpretation Summary    Abnormal myocardial perfusion scan.  This has no conclusive evidence of any significant reversible ischemia    There are two significant perfusion abnormalities.    Perfusion Abnormality #1 - There is a moderate to severe intensity, medium sized, equivocal perfusion abnormality that is fixed in the mid to apical anteroapical wall(s). This finding is equivocal due to soft tissue shadow.    Perfusion  Abnormality #2 - There is a medium sized, mild to moderate intensity, fixed perfusion abnormality consistent with scar in the basal to mid lateral wall(s).    There are no other significant perfusion abnormalities.    The gated perfusion images showed an ejection fraction of 69% at rest. The gated perfusion images showed an ejection fraction of 64% post stress. Normal ejection fraction is greater than 53%.    There is normal wall motion at rest and post-stress.    LV cavity size is normal at rest and normal at post-stress.    The ECG portion of the study is negative for ischemia.    The patient reported no chest pain during the stress test.    During stress, occasional PVCs are noted.    No valid procedures specified.    Physical Exam    Vitals: Blood pressure is 182/86.  General: No acute distress. Well-developed. Well-nourished.  Eyes:  Sclerae anicteric.  Cardiovascular: Regular rate. Regular rhythm. Mild murmur. No rubs. No gallops. Normal S1, S2. No carotid bruits.  Respiratory: Normal respiratory effort. Clear to auscultation bilaterally. No rales. No rhonchi. No wheezing.  Abdomen: Soft. Non-tender. Non-distended. Normoactive bowel sounds.  Musculoskeletal: No  obvious deformity.  Extremities: Mild edema in legs.  Neurological: Alert & oriented x3. No slurred speech. Normal gait.  Psychiatric: Normal mood. Normal affect. Good insight. Good judgment.  Skin: Warm. Dry. No rash.         I HAVE REVIEWED :    The vital signs, nurses notes, and all the pertinent radiology and labs.        Current Outpatient Medications   Medication Instructions    allopurinoL (ZYLOPRIM) 100 mg, Oral, Daily    amLODIPine (NORVASC) 2.5 mg, Oral, Daily    aspirin 81 mg, Nightly    calcium-vitamin D 600 mg(1,500mg) -400 unit Tab 1 tablet, 2 times daily    carvediloL (COREG) 12.5 mg, Oral, 2 times daily    fluticasone propionate (FLONASE) 50 mcg/actuation nasal spray 1 spray, Daily    furosemide (LASIX) 40 mg    gabapentin (NEURONTIN)  100 mg, Oral, 3 times daily    hydrALAZINE (APRESOLINE) 25 mg, Oral, 3 times daily    LIDOcaine (LIDODERM) 5 % 1 patch, Transdermal, Daily, Remove & Discard patch within 12 hours or as directed by MD    meloxicam (MOBIC) 15 mg, Oral, Daily PRN    rosuvastatin (CRESTOR) 20 mg, Oral, Nightly    valsartan (DIOVAN) 160 MG tablet 2 times daily          Assessment & Plan   Assessment & Plan    I10 Essential (primary) hypertension  I51.7 Cardiomegaly  R01.0 Benign and innocent cardiac murmurs  R06.00 Dyspnea, unspecified  R60.0 Localized edema  G47.33 Obstructive sleep apnea (adult) (pediatric)  M47.9 Spondylosis, unspecified  M25.561 Pain in right knee  Z99.89 Dependence on other enabling machines and devices    IMPRESSION:  Blood pressure elevated at 182/86 in office but lower readings at home.  Normal PET stress test, EKG, and echocardiogram.  Back pain may impact blood pressure elevation.  Dilated left atrium on echo, possibly related to sleep apnea.  Discussed potential relationship between back pain and elevated blood pressure.    HYPERTENSION:  - Monitor blood pressure at home and record readings.  - Upload blood pressure readings to patient portal or have daughter take a picture and upload it.  - Alternatively, drop off blood pressure readings at the office without making an appointment.  - Limit salt intake to less than 2000 mg daily.    EDEMA:  - Elevate legs when at home to reduce swelling.  - Consider using compression stockings to improve circulation.    FOLLOW-UP:  - Follow up in 3 months.  - Request daughter to fax cholesterol test results to the office if available.           Dyspnea on exertion  Improved.  Negative cardiac testing.        This note was generated with the assistance of ambient listening technology. Verbal consent was obtained by the patient and accompanying visitor(s) for the recording of patient appointment to facilitate this note. I attest to having reviewed and edited the generated note  for accuracy, though some syntax or spelling errors may persist. Please contact the author of this note for any clarification.             [1]   Social History  Tobacco Use    Smoking status: Never    Smokeless tobacco: Never   Substance Use Topics    Alcohol use: No    Drug use: No

## 2025-04-15 NOTE — PROGRESS NOTES
Outpatient Rehab    Physical Therapy Evaluation    Patient Name: Tierra Taylor  MRN: [id number]  YOB: 1945  Encounter Date: 4/15/2025    Therapy Diagnosis:   Encounter Diagnoses   Name Primary?    Lumbar pain     Osteoarthritis of spine with radiculopathy, lumbar region      Physician: Jorge Day*    Physician Orders: Eval and Treat  Medical Diagnosis: Lumbar pain  Osteoarthritis of spine with radiculopathy, lumbar region    Visit # / Visits Authorized:  1 / 1  Insurance Authorization Period: 4/1/2025 to 4/1/2026  Date of Evaluation: 4/15/2025  Plan of Care Certification: 4/15/2025 to 6/3/25     Time In:   1700  Time Out:  1750  Total Time:   50  Total Billable Time:  50    Intake Outcome Measure for FOTO Survey  Therapist reviewed FOTO scores for Tierra Taylor on 4/15/2025.   FOTO report - see Media section or FOTO account episode details.   Intake Score: 37%    Subjective   History of Present Illness  Tierra is a 79 y.o. female who reports to physical therapy with a chief concern of c/o right leg pain and numbness coming from the low back..     The patient reports a medical diagnosis of lumbar pain, Osteoarthritis of spine with with radiculopathy.    Diagnostic tests related to this condition: X-ray.   X-Ray Details: Lumbar spine 3/19/25Vertebral bodies maintain normal height and alignment.  No fracture or abnormal subluxation.  Advanced multilevel disc degeneration with moderate to severe intervertebral disc space narrowing, degenerative endplate changes and anterior marginal osteophyte formation at every visualized thoracolumbar level, most pronounced at L4-5 and L5-S1.  Scattered vacuum disc phenomenon.  Moderate lower lumbar facet arthropathy.  Sacroiliac joints are maintained.    Dominant Hand: Right  History of Present Condition/Illness: Onset of low back pain 3 weeks ago without known trauma or injury. C/o numbness and pain that runs down the right leg from the low  back. History of arthritic knees.    Pain   Patient reports a current pain level of 5/10. Pain at best is reported as 0/10. Pain at worst is reported as 10/10.   Location: right lumbar flank and buttocks.  Clinical Progression (since onset): Improved  Pain Qualities: Squeezing, Tightness  Pain-Relieving Factors: Medications - prescription, Rest  Pain-Aggravating Factors: Bending, Squatting     Treatment History  Treatments  Previously Received Treatments: No  Currently Receiving Treatments: No    Living Arrangements  Living Situation  Housing: Home independently  Living Arrangements: Alone  Support Systems: Children    Home Setup  Type of Structure: House  Home Access: Level entry  Number of Levels in Home: One level  Bathroom Shower/Tub: Walk-in shower    Equipment/Treatments  Mobility Equipment: Straight cane    Employment  Employment Status: Retired   Used to be hospital .    Past Medical History/Physical Systems Review:   Tierra Taylor  has a past medical history of Arthritis, Hypertension, and Sleep apnea.    Tierra Taylor  has a past surgical history that includes Hysterectomy; FATTY TUMOR; Cervical discectomy; Knee surgery; and Breast biopsy.    Tierra has a current medication list which includes the following prescription(s): allopurinol, amlodipine, aspirin, calcium-vitamin d, carvedilol, fluticasone propionate, furosemide, gabapentin, hydralazine, lidocaine, meloxicam, rosuvastatin, and valsartan.    Review of patient's allergies indicates:   Allergen Reactions    Lisinopril Other (See Comments)     Cough    Penicillins      swelling    Tramadol      causes itching        Objective   Posture  Patient presents with a Forward head position.       Left pelvis characteristics: Anterior Superior Iliac Spine Higher      Lower Extremity Sensation  General Lumbar/Lower Extremity Sensation  Intact: Right and Left  Right Lumbar/Lower Extremity Sensation  Intact: Light Touch, Sharp/Dull  Discrimination, Static Two Point Discrimination, Dynamic Two Point Discrimination, Kinesthesia, and Proprioception  Right Lumbar/Lower Extremity Sensation Stocking Glove Pattern: No    Left Lumbar/Lower Extremity Sensation  Intact: Light Touch, Static Two Point Discrimination, Dynamic Two Point Discrimination, Sharp/Dull Discrimination, Kinesthesia, and Proprioception  Left Lumbar/Lower Extremity Sensation Stocking Glove Pattern: No    Report of intermittent numbness and paresthesia to thr entire right leg.    Spinal Muscle Palpation   No tenderness noted. Increased muscle tone to the paravertebrals.    Thoraco- Lumbar Range of Motion   Active (deg) Passive (deg) Pain   Flexion 50   Yes   Extension 10   Yes   Right Lateral Flexion 25   Yes   Right Rotation 40   Yes   Left Lateral Flexion 25   Yes   Left Rotation 40   Yes      Provocative Tests  Negative: Valsalva     Lumbar Tests - SLR and Tension  Negative: Right Passive Straight Leg Raise and Left Passive Straight Leg Raise     Pelvic Girdle / Sacrum Tests  Positive: Right YUMIKO  Negative: Left YUMIKO     Transfers/Bed Mobility Details  Independent with bed mobility and transfers.    Ambulation Assistance Required  Surface With  Assistive Device Without Assistive Device Details   Level Independent        Uneven Independent         Ambulation Details    Use of straight cane for gait.    Gait Analysis  Gait Pattern: Antalgic    Treatment:  Therapeutic Exercise  TE 1: posterior pelvic tilt  TE 2: knee to chest unilateral  TE 3: knee to chest double  TE 4: trunk extension    Time Entry(in minutes):  PT Evaluation (Low) Time Entry: 35  Therapeutic Exercise Time Entry: 15    Assessment & Plan   Assessment  Tierra presents with a condition of Low complexity.   Presentation of Symptoms: Stable  Will Comorbidities Impact Care: Yes  Osteoarthritic both knees.    Functional Limitations: Activity tolerance, Range of motion, Performing household chores, Functional mobility,  Gait limitations  Impairments: Abnormal gait, Pain with functional activity, Impaired physical strength    Patient Goal for Therapy (PT): to get better.  Prognosis: Good  Assessment Details: Patient has problem walking far. Unable to bend without causing aggravated discomforts. Condition is attributed to arthritis but onset of discomforts is recent as 3 weeks ago.    Plan  From a physical therapy perspective, the patient would benefit from: Skilled Rehab Services    Planned therapy interventions include: Therapeutic activities, Neuromuscular re-education, Therapeutic exercise, and Manual therapy.    Planned modalities to include: Electrical stimulation - passive/unattended, Thermotherapy (hot pack), Ultrasound, and Mechanical traction.        Visit Frequency: 2 times Per Week for 6 Weeks.     This plan was discussed with Patient.   Discussion participants: Agreed Upon Plan of Care     Patient's spiritual, cultural, and educational needs considered and patient agreeable to plan of care and goals.     Education  Education was done with Patient. The patient's learning style includes Demonstration, Listening, and Pictures/video. The patient Demonstrates understanding and Verbalizes understanding.         Discussed Plan of care; Home exercise instructions.     Goals:   Active       LONG TERM GOALS:       1. Patient will report less pain intensity and frequency  90% of the time.       Start:  04/15/25    Expected End:  06/03/25            2. Patient will have pain free trunk rotation and forward flexion       Start:  04/15/25    Expected End:  06/03/25            3. Patient will demonstrate consistent good body mechanid s with mobility I.e liiting an object from the floor.       Start:  04/15/25    Expected End:  06/03/25            4. Improved FOTO score 47/100       Start:  04/15/25               SHORT TERM GOALS       1. Patient willr eport active compliance to home exercises.       Start:  04/15/25    Expected End:   04/29/25            2. Patient will tolerate prone lying 5' for trunk extension stretch       Start:  04/15/25    Expected End:  04/29/25                Benji Potts, PT

## 2025-04-30 ENCOUNTER — CLINICAL SUPPORT (OUTPATIENT)
Dept: REHABILITATION | Facility: HOSPITAL | Age: 80
End: 2025-04-30
Payer: MEDICARE

## 2025-04-30 DIAGNOSIS — M47.26 OSTEOARTHRITIS OF SPINE WITH RADICULOPATHY, LUMBAR REGION: Primary | ICD-10-CM

## 2025-04-30 DIAGNOSIS — M54.50 LUMBAR PAIN: ICD-10-CM

## 2025-04-30 PROCEDURE — 97112 NEUROMUSCULAR REEDUCATION: CPT | Mod: PO,CQ

## 2025-04-30 PROCEDURE — 97110 THERAPEUTIC EXERCISES: CPT | Mod: PO,CQ

## 2025-04-30 NOTE — PROGRESS NOTES
Outpatient Rehab    Physical Therapy Visit    Patient Name: Tierra Taylor  MRN: [id number]  YOB: 1945  Encounter Date: 4/30/2025    Therapy Diagnosis:   Encounter Diagnoses   Name Primary?    Osteoarthritis of spine with radiculopathy, lumbar region Yes    Lumbar pain      Physician: Jorge Day*    Physician Orders: Eval and Treat  Medical Diagnosis: Lumbar pain  Osteoarthritis of spine with radiculopathy, lumbar region    Visit # / Visits Authorized:  1 / 10  Insurance Authorization Period: 4/7/2025 to 7/10/2025  Date of Evaluation: 4/15/2025  Plan of Care Certification: 4/15/2025 to 6/3/25      PT/PTA: PTA   Number of PTA visits since last PT visit:1    Time In: 1505   Time Out: 1600  Total Time: 55   Total Billable Time: 27    FOTO:  Intake Score: 37%  Survey Score 1:  %  Survey Score 2:  %         Subjective   5/10 R LB soreness throbbing. Has been performing HEP daily.  Stansding makes her pain worse. Better sitting or laying down..  Pain reported as 5/10.      Objective            Treatment:  Therapeutic Exercise  TE 1: Standing trunk extension x 20,     Prone press up x 20,     Seated hamstring stretches 3 x 30s,     Seated modified piriformis stretches 3 x 30s each,  Balance/Neuromuscular Re-Education  NMR 1: Prone glute squeeze x 20,     Prone hip extension x 10,     posterior pelvic tilt x 20,     Side lying clam x 10,     trunk extension prone  press up x 20  Bent over hip extension x 5 each,   Standing hip abduction x 5 each,  CHARGES BASED ON 1-1 TX:  Time Entry(in minutes):  Neuromuscular Re-Education Time Entry: 41  Therapeutic Exercise Time Entry: 14    Assessment & Plan   Assessment: Slow and guarded with all activities. Pain reduced after session but pt had difficulty giving numerical rating. Progressed with caution to prevent exacerbation of LB or knee symptoms.  Evaluation/Treatment Tolerance: Patient limited by pain    Patient will continue to benefit from  skilled outpatient physical therapy to address the deficits listed in the problem list box on initial evaluation, provide pt/family education and to maximize pt's level of independence in the home and community environment.     Patient's spiritual, cultural, and educational needs considered and patient agreeable to plan of care and goals.           Plan: Continue per POC, progressing with ROM, flexibility, strengthening, and functional mobility as appropriate.    Goals:   Active       LONG TERM GOALS:       1. Patient will report less pain intensity and frequency  90% of the time. (Progressing)       Start:  04/15/25    Expected End:  06/03/25            2. Patient will have pain free trunk rotation and forward flexion (Progressing)       Start:  04/15/25    Expected End:  06/03/25            3. Patient will demonstrate consistent good body mechanid s with mobility I.e liiting an object from the floor. (Progressing)       Start:  04/15/25    Expected End:  06/03/25            4. Improved FOTO score 47/100 (Progressing)       Start:  04/15/25               SHORT TERM GOALS       1. Patient willr eport active compliance to home exercises. (Progressing)       Start:  04/15/25    Expected End:  04/29/25            2. Patient will tolerate prone lying 5' for trunk extension stretch (Progressing)       Start:  04/15/25    Expected End:  04/29/25                Evelyn Richards PTA

## 2025-05-05 ENCOUNTER — CLINICAL SUPPORT (OUTPATIENT)
Dept: REHABILITATION | Facility: HOSPITAL | Age: 80
End: 2025-05-05
Payer: MEDICARE

## 2025-05-05 DIAGNOSIS — M54.50 LUMBAR PAIN: ICD-10-CM

## 2025-05-05 DIAGNOSIS — M47.26 OSTEOARTHRITIS OF SPINE WITH RADICULOPATHY, LUMBAR REGION: Primary | ICD-10-CM

## 2025-05-05 PROCEDURE — 97110 THERAPEUTIC EXERCISES: CPT | Mod: PO

## 2025-05-05 NOTE — PROGRESS NOTES
Outpatient Rehab    Physical Therapy Visit    Patient Name: Tierra Taylor  MRN: [id number]  YOB: 1945  Encounter Date: 5/5/2025    Therapy Diagnosis:   Encounter Diagnoses   Name Primary?    Osteoarthritis of spine with radiculopathy, lumbar region Yes    Lumbar pain      Physician: Jorge Day*    Physician Orders: Eval and Treat  Medical Diagnosis: Lumbar pain  Osteoarthritis of spine with radiculopathy, lumbar region    Visit # / Visits Authorized:  2 / 10  Insurance Authorization Period: 4/7/2025 to 7/10/2025  Date of Evaluation: 4/15/25  Plan of Care Certification:4/15/25 to 6/3/25       Time In:   1500  Time Out:  1555  Total Time (in minutes):   55  Total Billable Time (in minutes):  30 concurrent visit.      Subjective   Patient states she can walk without the cane but wants to be more secure that she uses a cane and sometimes the RW.  Pain reported as 3/10.      Objective    Presents to clinic on cane        Treatment:  Therapeutic Exercise  TE 1: Nustep L3 all 4's 10'  TE 2: gastroc/hamstring stretches using strap 10/10  TE 3: knee to chest single 10/10  TE 4: knee to chest double 10  TE 5: posterior pelvic tilt 20  TE 6: trunke rotation using theraball 20/20  TE 7: Reverse sit up using theraballl 20  TE 8: SLR empahsis on andominal bracing 10.10  TE 9: sidelying open book 15/15      Time Entry(in minutes):  Therapeutic Exercise Time Entry: 55    Assessment & Plan   Assessment: Pain to right hip on knee to chest and SLR. States her righ calf is tender. Performs a finish all exercises without difficulty except bilat knee to chest as she can't reach behind the knee.  Evaluation/Treatment Tolerance: Patient tolerated treatment well    Patient will continue to benefit from skilled outpatient physical therapy to address the deficits listed in the problem list box on initial evaluation, provide pt/family education and to maximize pt's level of independence in the home and  community environment.     Patient's spiritual, cultural, and educational needs considered and patient agreeable to plan of care and goals.           Plan:  Continue Plan of care.    Goals:   Active       LONG TERM GOALS:       1. Patient will report less pain intensity and frequency  90% of the time. (Progressing)       Start:  04/15/25    Expected End:  06/03/25            2. Patient will have pain free trunk rotation and forward flexion (Progressing)       Start:  04/15/25    Expected End:  06/03/25            3. Patient will demonstrate consistent good body mechanid s with mobility I.e liiting an object from the floor. (Progressing)       Start:  04/15/25    Expected End:  06/03/25            4. Improved FOTO score 47/100 (Progressing)       Start:  04/15/25               SHORT TERM GOALS       1. Patient willr eport active compliance to home exercises. (Progressing)       Start:  04/15/25    Expected End:  04/29/25            2. Patient will tolerate prone lying 5' for trunk extension stretch (Progressing)       Start:  04/15/25    Expected End:  04/29/25                Benji Potts, PT

## 2025-05-12 ENCOUNTER — CLINICAL SUPPORT (OUTPATIENT)
Dept: REHABILITATION | Facility: HOSPITAL | Age: 80
End: 2025-05-12
Payer: MEDICARE

## 2025-05-12 DIAGNOSIS — I10 ESSENTIAL HYPERTENSION: Primary | ICD-10-CM

## 2025-05-12 DIAGNOSIS — M47.26 OSTEOARTHRITIS OF SPINE WITH RADICULOPATHY, LUMBAR REGION: Primary | ICD-10-CM

## 2025-05-12 DIAGNOSIS — M54.50 LUMBAR PAIN: ICD-10-CM

## 2025-05-12 PROCEDURE — 97112 NEUROMUSCULAR REEDUCATION: CPT | Mod: PO,CQ

## 2025-05-12 PROCEDURE — 97110 THERAPEUTIC EXERCISES: CPT | Mod: PO,CQ

## 2025-05-12 RX ORDER — AMLODIPINE BESYLATE 2.5 MG/1
2.5 TABLET ORAL DAILY
Qty: 90 TABLET | Refills: 3 | Status: SHIPPED | OUTPATIENT
Start: 2025-05-12 | End: 2026-05-12

## 2025-05-12 NOTE — PROGRESS NOTES
Outpatient Rehab    Physical Therapy Visit    Patient Name: Tierra Taylor  MRN: [id number]  YOB: 1945  Encounter Date: 5/12/2025    Therapy Diagnosis:   Encounter Diagnoses   Name Primary?    Osteoarthritis of spine with radiculopathy, lumbar region Yes    Lumbar pain      Physician: Jorge Day*    Physician Orders: Eval and Treat  Medical Diagnosis: Lumbar pain  Osteoarthritis of spine with radiculopathy, lumbar region    Visit # / Visits Authorized:  3 / 10  Insurance Authorization Period: 4/7/2025 to 7/10/2025  Date of Evaluation: 4/15/2025  Plan of Care Certification:4/15/25 to 6/3/25      PT/PTA: PTA   Number of PTA visits since last PT visit:1  Time In: 1400   Time Out: 1500  Total Time (in minutes): 60   Total Billable Time (in minutes): 60    FOTO:  Intake Score:  %  Survey Score 2:  %  Survey Score 3:  %    Precautions:       Subjective   Minimal pain at start of treatment..  Pain reported as 2/10.      Objective            Treatment:  Therapeutic Exercise  TE 1: Nustep L3 all 4's 10'  TE 2: gastroc/hamstring stretches using strap 10/10  TE 6: trunke rotation using theraball 20/20  Balance/Neuromuscular Re-Education  NMR 1: Open books 15 x each direction performed in sidelying  NMR 2: TrA activation with BKFO  NMR 3: TrA activation with SLRs 10  NMR 4: Standing lumbar ext 20 x  NMR 5: standing hip abd 2 x 10  NMR 6: standing hip ext with glute set 2 x 10    Time Entry(in minutes):       Assessment & Plan   Assessment: Pt tolerated all recommended TE including some advancements without increase of painful symptoms.       Patient will continue to benefit from skilled outpatient physical therapy to address the deficits listed in the problem list box on initial evaluation, provide pt/family education and to maximize pt's level of independence in the home and community environment.     Patient's spiritual, cultural, and educational needs considered and patient agreeable to plan  of care and goals.           Plan: Continue PLan of care.    Goals:   Active       LONG TERM GOALS:       1. Patient will report less pain intensity and frequency  90% of the time. (Ongoing)       Start:  04/15/25    Expected End:  06/03/25            2. Patient will have pain free trunk rotation and forward flexion (Progressing)       Start:  04/15/25    Expected End:  06/03/25            3. Patient will demonstrate consistent good body mechanid s with mobility I.e liiting an object from the floor. (Ongoing)       Start:  04/15/25    Expected End:  06/03/25            4. Improved FOTO score 47/100 (Ongoing)       Start:  04/15/25               SHORT TERM GOALS       1. Patient willr eport active compliance to home exercises. (Ongoing)       Start:  04/15/25    Expected End:  04/29/25            2. Patient will tolerate prone lying 5' for trunk extension stretch (Ongoing)       Start:  04/15/25    Expected End:  04/29/25                Angie Arias, PTA

## 2025-05-19 ENCOUNTER — CLINICAL SUPPORT (OUTPATIENT)
Dept: REHABILITATION | Facility: HOSPITAL | Age: 80
End: 2025-05-19
Payer: MEDICARE

## 2025-05-19 DIAGNOSIS — M54.50 LUMBAR PAIN: ICD-10-CM

## 2025-05-19 DIAGNOSIS — M47.26 OSTEOARTHRITIS OF SPINE WITH RADICULOPATHY, LUMBAR REGION: Primary | ICD-10-CM

## 2025-05-19 PROCEDURE — 97112 NEUROMUSCULAR REEDUCATION: CPT | Mod: PO,CQ

## 2025-05-19 PROCEDURE — 97110 THERAPEUTIC EXERCISES: CPT | Mod: PO,CQ

## 2025-05-19 NOTE — PROGRESS NOTES
Outpatient Rehab    Physical Therapy Visit    Patient Name: Tierra Taylor  MRN: [id number]  YOB: 1945  Encounter Date: 5/19/2025    Therapy Diagnosis:   Encounter Diagnoses   Name Primary?    Osteoarthritis of spine with radiculopathy, lumbar region Yes    Lumbar pain        Physician: Jorge Day*    Physician Orders: Eval and Treat  Medical Diagnosis: Lumbar pain  Osteoarthritis of spine with radiculopathy, lumbar region    Visit # / Visits Authorized:  4 / 10  Insurance Authorization Period: 4/7/2025 to 7/10/2025  Date of Evaluation: 4/15/2025  Plan of Care Certification: 4/15/2025 to 6/3/25      PT/PTA:     Number of PTA visits since last PT visit:     Time In: 1406   Time Out: 1502  Total Time: 56   Total Billable Time: 56    FOTO:  Intake Score: 37%   FOTO next session if possible  Survey Score 1:  %  Survey Score 2:  %     Precautions:   none stated     Subjective   6/10 L knee pain at present. States it may be aggravated from long ride to/from [place] to visit family..  Pain reported as 6/10.      Objective            Treatment:  Therapeutic Exercise  TE 1: Nustep L3 all 4's 10'  gastroc/hamstring stretches using strap 10 x 10s each,       Supine piriformis stretch to opposite shoulder 5 x 10s each,       trunk rotation 20/20,  Balance/Neuromuscular Re-Education  NMR 1: Posterior pelvic tilt x 20,       Side lying clams x 10,          Open books 15 x each direction performed in sidelying,          TrA activation with bent knee fall out,        TrA activation with SLRs 10,        Standing lumbar ext 20 x,       standing hip abd 2 x 10,        standing hip ext with glute set 2 x 10,  CHARGES BASED ON 1-1 TX:  Time Entry(in minutes):  Neuromuscular Re-Education Time Entry: 36  Therapeutic Exercise Time Entry: 20    Assessment & Plan   Assessment: L knee pain which resolved after NuSTep and standing exercises. Pogressing well without pain provocation. Manual cues for core and  glute activation with good correction. Progressed HEP. Reported pain relief after session.  Evaluation/Treatment Tolerance: Patient tolerated treatment well    Patient will continue to benefit from skilled outpatient physical therapy to address the deficits listed in the problem list box on initial evaluation, provide pt/family education and to maximize pt's level of independence in the home and community environment.     Patient's spiritual, cultural, and educational needs considered and patient agreeable to plan of care and goals.           Plan: Continue PLan of care.    Goals:   Active       LONG TERM GOALS:       1. Patient will report less pain intensity and frequency  90% of the time. (Progressing)       Start:  04/15/25    Expected End:  06/03/25            2. Patient will have pain free trunk rotation and forward flexion (Progressing)       Start:  04/15/25    Expected End:  06/03/25            3. Patient will demonstrate consistent good body mechanid s with mobility I.e liiting an object from the floor. (Progressing)       Start:  04/15/25    Expected End:  06/03/25            4. Improved FOTO score 47/100 (Progressing)       Start:  04/15/25               SHORT TERM GOALS       1. Patient willr eport active compliance to home exercises. (Progressing)       Start:  04/15/25    Expected End:  04/29/25            2. Patient will tolerate prone lying 5' for trunk extension stretch (Progressing)       Start:  04/15/25    Expected End:  04/29/25                Evelyn Maurice, PTA      FOTO QR code:

## 2025-05-26 ENCOUNTER — CLINICAL SUPPORT (OUTPATIENT)
Dept: REHABILITATION | Facility: HOSPITAL | Age: 80
End: 2025-05-26
Payer: MEDICARE

## 2025-05-26 DIAGNOSIS — M47.26 OSTEOARTHRITIS OF SPINE WITH RADICULOPATHY, LUMBAR REGION: Primary | ICD-10-CM

## 2025-05-26 DIAGNOSIS — M54.50 LUMBAR PAIN: ICD-10-CM

## 2025-05-26 PROCEDURE — 97110 THERAPEUTIC EXERCISES: CPT | Mod: PO,CQ

## 2025-05-26 PROCEDURE — 97112 NEUROMUSCULAR REEDUCATION: CPT | Mod: PO,CQ

## 2025-05-26 NOTE — PROGRESS NOTES
Outpatient Rehab    Physical Therapy Visit    Patient Name: Tierra Taylor  MRN: [id number]  YOB: 1945  Encounter Date: 5/26/2025    Therapy Diagnosis:   Encounter Diagnoses   Name Primary?    Osteoarthritis of spine with radiculopathy, lumbar region Yes    Lumbar pain          Physician: Jorge Day*    Physician Orders: Eval and Treat  Medical Diagnosis: Lumbar pain  Osteoarthritis of spine with radiculopathy, lumbar region    Visit # / Visits Authorized:  5 / 10  Insurance Authorization Period: 4/7/2025 to 7/10/2025  Date of Evaluation: 4/15/2025  Plan of Care Certification: 4/15/2025 to 6/3/25      PT/PTA:     Number of PTA visits since last PT visit:     Time In: 1402   Time Out: 1500  Total Time: 58   Total Billable Time: 29    FOTO:  Intake Score: 37%   FOTO next session if possible  Survey Score 1:  %  Survey Score 2:  %     Precautions:   none stated     Subjective   3/10 aching B LB. HEP daily..  Pain reported as 3/10.      Objective            Treatment:  Therapeutic Exercise  TE 1: Nustep L3 all 4's 10'  gastroc/hamstring stretches using strap 10 x 10s each,       Supine piriformis stretch to opposite shoulder 5 x 10s each,       trunk rotation 20/20,  Balance/Neuromuscular Re-Education  NMR 1: Posterior pelvic tilt x 20-caused pain,       Supine glute squeeze extension x 20 (improved pain).    Side lying clams x 10,          Open books 20 x each direction performed in sidelying-modified with hand across chest to prevent irritation of left shoulder),          TrA activation with bent knee fall out,        TrA activation with SLRs 10,        Standing lumbar ext 20 x,       standing hip abd 2 x 10-stopped 2* discomfort,        standing hip ext with glute set 2 x 10,  CHARGES BASED ON 1-1 TX:  Time Entry(in minutes):  Neuromuscular Re-Education Time Entry: 40  Therapeutic Exercise Time Entry: 18    Assessment & Plan   Assessment: Having some L shoulder pain. LBP 3/10.  Progressing well with strengthening and ROM, flexibility LB and LE:s with good response. Standing hip abduction provoked pain, so stopped and pain resolved.  Evaluation/Treatment Tolerance: Patient tolerated treatment well    Patient will continue to benefit from skilled outpatient physical therapy to address the deficits listed in the problem list box on initial evaluation, provide pt/family education and to maximize pt's level of independence in the home and community environment.     Patient's spiritual, cultural, and educational needs considered and patient agreeable to plan of care and goals.           Plan: Continue PLan of care.    Goals:   Active       LONG TERM GOALS:       1. Patient will report less pain intensity and frequency  90% of the time. (Progressing)       Start:  04/15/25    Expected End:  06/03/25            2. Patient will have pain free trunk rotation and forward flexion (Progressing)       Start:  04/15/25    Expected End:  06/03/25            3. Patient will demonstrate consistent good body mechanid s with mobility I.e liiting an object from the floor. (Progressing)       Start:  04/15/25    Expected End:  06/03/25            4. Improved FOTO score 47/100 (Progressing)       Start:  04/15/25               SHORT TERM GOALS       1. Patient willr eport active compliance to home exercises. (Progressing)       Start:  04/15/25    Expected End:  04/29/25            2. Patient will tolerate prone lying 5' for trunk extension stretch (Progressing)       Start:  04/15/25    Expected End:  04/29/25                Evelyn [place], PTA      FOTO QR code:

## 2025-05-27 ENCOUNTER — OFFICE VISIT (OUTPATIENT)
Dept: PHYSICAL MEDICINE AND REHAB | Facility: CLINIC | Age: 80
End: 2025-05-27
Payer: MEDICARE

## 2025-05-27 VITALS
HEIGHT: 64 IN | HEART RATE: 70 BPM | DIASTOLIC BLOOD PRESSURE: 88 MMHG | WEIGHT: 218.06 LBS | SYSTOLIC BLOOD PRESSURE: 182 MMHG | BODY MASS INDEX: 37.23 KG/M2

## 2025-05-27 DIAGNOSIS — M75.42 SHOULDER IMPINGEMENT SYNDROME, LEFT: Primary | ICD-10-CM

## 2025-05-27 DIAGNOSIS — M47.26 OSTEOARTHRITIS OF SPINE WITH RADICULOPATHY, LUMBAR REGION: ICD-10-CM

## 2025-05-27 PROCEDURE — 1101F PT FALLS ASSESS-DOCD LE1/YR: CPT | Mod: CPTII,S$GLB,, | Performed by: STUDENT IN AN ORGANIZED HEALTH CARE EDUCATION/TRAINING PROGRAM

## 2025-05-27 PROCEDURE — 3077F SYST BP >= 140 MM HG: CPT | Mod: CPTII,S$GLB,, | Performed by: STUDENT IN AN ORGANIZED HEALTH CARE EDUCATION/TRAINING PROGRAM

## 2025-05-27 PROCEDURE — 99214 OFFICE O/P EST MOD 30 MIN: CPT | Mod: 25,S$GLB,, | Performed by: STUDENT IN AN ORGANIZED HEALTH CARE EDUCATION/TRAINING PROGRAM

## 2025-05-27 PROCEDURE — 99999 PR PBB SHADOW E&M-EST. PATIENT-LVL III: CPT | Mod: PBBFAC,,, | Performed by: STUDENT IN AN ORGANIZED HEALTH CARE EDUCATION/TRAINING PROGRAM

## 2025-05-27 PROCEDURE — 20611 DRAIN/INJ JOINT/BURSA W/US: CPT | Mod: LT,S$GLB,, | Performed by: STUDENT IN AN ORGANIZED HEALTH CARE EDUCATION/TRAINING PROGRAM

## 2025-05-27 PROCEDURE — 3079F DIAST BP 80-89 MM HG: CPT | Mod: CPTII,S$GLB,, | Performed by: STUDENT IN AN ORGANIZED HEALTH CARE EDUCATION/TRAINING PROGRAM

## 2025-05-27 PROCEDURE — 1159F MED LIST DOCD IN RCRD: CPT | Mod: CPTII,S$GLB,, | Performed by: STUDENT IN AN ORGANIZED HEALTH CARE EDUCATION/TRAINING PROGRAM

## 2025-05-27 PROCEDURE — 3288F FALL RISK ASSESSMENT DOCD: CPT | Mod: CPTII,S$GLB,, | Performed by: STUDENT IN AN ORGANIZED HEALTH CARE EDUCATION/TRAINING PROGRAM

## 2025-05-27 PROCEDURE — 1125F AMNT PAIN NOTED PAIN PRSNT: CPT | Mod: CPTII,S$GLB,, | Performed by: STUDENT IN AN ORGANIZED HEALTH CARE EDUCATION/TRAINING PROGRAM

## 2025-05-27 RX ORDER — METHYLPREDNISOLONE ACETATE 40 MG/ML
40 INJECTION, SUSPENSION INTRA-ARTICULAR; INTRALESIONAL; INTRAMUSCULAR; SOFT TISSUE
Status: DISCONTINUED | OUTPATIENT
Start: 2025-05-27 | End: 2025-05-27 | Stop reason: HOSPADM

## 2025-05-27 RX ADMIN — METHYLPREDNISOLONE ACETATE 40 MG: 40 INJECTION, SUSPENSION INTRA-ARTICULAR; INTRALESIONAL; INTRAMUSCULAR; SOFT TISSUE at 02:05

## 2025-05-27 NOTE — PROGRESS NOTES
PHYSICAL MEDICINE AND REHABILITATION  Follow up visit:    Subjective:   Chief Complaint:    Chief Complaint   Patient presents with    Right leg pain     Interval note on 05/27/2025:  Patient arrives today for scheduled follow up.  Tolerating gabapentin well. She is currently in PT and attending 2 times per week. No s/e to gabapentin. Ok with weaning. She would like to discuss the left shoulder. She recalls a chronic history of left shoulder. Denies assoc n/t/w in the left arm. Trouble with sleeping on the left shoulder.     Review of Systems  Per HPI    Imaging/Diagnostic Studies  XR LUMBAR SPINE AP AND LATERAL     CLINICAL HISTORY:  lumbar pain with radiculopathy into right lower extremity;     TECHNIQUE:  AP, lateral and spot images were performed of the lumbar spine.     COMPARISON:  Lumbar spine radiographs 01/04/2024     FINDINGS:  Vertebral bodies maintain normal height and alignment.  No fracture or abnormal subluxation.  Advanced multilevel disc degeneration with moderate to severe intervertebral disc space narrowing, degenerative endplate changes and anterior marginal osteophyte formation at every visualized thoracolumbar level, most pronounced at L4-5 and L5-S1.  Scattered vacuum disc phenomenon.  Moderate lower lumbar facet arthropathy.  Sacroiliac joints are maintained.     Impression:     As above.        Electronically signed by:Shane Leija  Date:                                            03/19/2025  ======  X-rays of the lumbar spine:  Narrative & Impression    5 views of the lumbar spine     Clinical history is low back pain     The lumbar spine is in satisfactory alignment. The vertebral bodies are of normal height.  There is multilevel disc space narrowing predominantly from L3 to S1. There is facet hypertrophy from L3 to S1. There are no pars defects. The paraspinous soft tissues are normal. The SI joints are symmetrical.     IMPRESSION: Multilevel degenerative disc disease most significant from  "L3 to S1 with facet hypertrophy     Electronically signed by:  Anay Duncan MD  01/04/2024 02:10 PM [facility] Workstation: [id number]       Past Medical History:   Diagnosis Date    Arthritis     Hypertension     Sleep apnea     doesn't wear CPAP       Past Surgical History:   Procedure Laterality Date    BREAST BIOPSY      CERVICAL DISCECTOMY      FATTY TUMOR      HYSTERECTOMY      KNEE SURGERY         Review of patient's allergies indicates:   Allergen Reactions    Lisinopril Other (See Comments)     Cough    Penicillins      swelling    Tramadol      causes itching       Current Medications[1]    Family History   Problem Relation Name Age of Onset    Hypertension Mother      Hypertension Father         Social History[2]      Objective:    BP (!) 182/88 (Patient Position: Sitting)   Pulse 70   Ht 5' 4" (1.626 m)   Wt 98.9 kg (218 lb 0.6 oz)   BMI 37.43 kg/m²   Physical Exam  Vitals and nursing note reviewed.   Constitutional:       Appearance: Normal appearance.   HENT:      Head: Normocephalic.   Eyes:      Extraocular Movements: Extraocular movements intact.   Cardiovascular:      Rate and Rhythm: Normal rate.      Pulses: Normal pulses.   Pulmonary:      Effort: Pulmonary effort is normal.   Abdominal:      General: Abdomen is flat.   Skin:     General: Skin is warm and dry.   Neurological:      General: No focal deficit present.      Mental Status: She is alert and oriented to person, place, and time. Mental status is at baseline.   Psychiatric:         Mood and Affect: Mood normal.         Behavior: Behavior normal.         Thought Content: Thought content normal.        Back Exam     Tenderness   The patient is experiencing tenderness in the lumbar and sacroiliac.    Muscle Strength   The patient has normal back strength.    Reflexes   Patellar:  normal  Achilles:  normal    Other   Sensation: normal  Gait: antalgic   Erythema: no back redness  Scars: absent      Left Shoulder Exam     Tenderness   Left " shoulder tenderness location: Supraspinatus muscle and superior trapezius muscle.    Range of Motion   Forward flexion:  abnormal     Muscle Strength   The patient has normal left shoulder strength.    Tests   Apprehension: negative  Rivas test: positive  Cross arm: negative  Impingement: positive  Drop arm: negative  Sulcus: absent    Other   Erythema: absent  Scars: absent  Sensation: normal  Pulse: present     Comments:  Positive Monet's               Assessment:       ICD-10-CM ICD-9-CM    1. Shoulder impingement syndrome, left  M75.42 726.2 Large Joint Aspiration/Injection: L subacromial bursa      2. Osteoarthritis of spine with radiculopathy, lumbar region  M47.26 721.3           Plan:   1. Shoulder impingement syndrome, left  Assessment & Plan:  Left subacromial bursa injection with corticosteroid.  Tolerated well.  See procedure note for details.        Orders:  -     Large Joint Aspiration/Injection: L subacromial bursa    2. Osteoarthritis of spine with radiculopathy, lumbar region  Assessment & Plan:  Wean Gabapentin 100mg TID  Continue meloxicam 15mg daily prn  Continue physical therapy  Continue use of cane for fall risk reduction  Continue prn norco for severe pain  RTC 3 months or PRN        Jorge Day MD  Physical Medicine & Rehabilitation     Disclaimer:  This note may have been prepared using voice recognition software, it may have not been extensively proofed, as such there could be errors within the text such as sound alike errors.  Contact the author of this note for clarification.              [1]   Current Outpatient Medications   Medication Sig Dispense Refill    allopurinoL (ZYLOPRIM) 100 MG tablet Take 1 tablet (100 mg total) by mouth once daily. 90 tablet 1    amLODIPine (NORVASC) 2.5 MG tablet Take 1 tablet (2.5 mg total) by mouth once daily. 90 tablet 3    aspirin 81 MG Chew Take 81 mg by mouth every evening.       calcium-vitamin D 600 mg(1,500mg) -400 unit Tab Take 1  tablet by mouth 2 (two) times a day.      carvediloL (COREG) 12.5 MG tablet Take 1 tablet (12.5 mg total) by mouth 2 (two) times daily. 180 tablet 1    fluticasone propionate (FLONASE) 50 mcg/actuation nasal spray 1 spray by Each Nostril route once daily.      furosemide (LASIX) 40 MG tablet Take 40 mg by mouth.      gabapentin (NEURONTIN) 100 MG capsule Take 1 capsule (100 mg total) by mouth 3 (three) times daily. 90 capsule 2    hydrALAZINE (APRESOLINE) 25 MG tablet TAKE 1 TABLET BY MOUTH THREE TIMES DAILY 90 tablet 0    LIDOcaine (LIDODERM) 5 % Place 1 patch onto the skin once daily. Remove & Discard patch within 12 hours or as directed by MD 30 patch 0    meloxicam (MOBIC) 15 MG tablet Take 1 tablet (15 mg total) by mouth daily as needed for Pain. 30 tablet 2    rosuvastatin (CRESTOR) 20 MG tablet Take 1 tablet (20 mg total) by mouth every evening. 90 tablet 1    valsartan (DIOVAN) 160 MG tablet Take by mouth 2 (two) times daily.       No current facility-administered medications for this visit.   [2]   Social History  Socioeconomic History    Marital status: Single   Tobacco Use    Smoking status: Never    Smokeless tobacco: Never   Substance and Sexual Activity    Alcohol use: No    Drug use: No    Sexual activity: Not Currently

## 2025-05-27 NOTE — ASSESSMENT & PLAN NOTE
Wean Gabapentin 100mg TID  Continue meloxicam 15mg daily prn  Continue physical therapy  Continue use of cane for fall risk reduction  Continue prn norco for severe pain  RTC 3 months or PRN

## 2025-05-27 NOTE — PROCEDURES
Large Joint Aspiration/Injection: L subacromial bursa    Date/Time: 5/27/2025 2:00 PM    Performed by: Jorge Day MD  Authorized by: Jorge Day MD    Consent Done?:  Yes (Verbal)  Indications:  Arthritis, diagnostic evaluation and pain  Site marked: the procedure site was marked    Timeout: prior to procedure the correct patient, procedure, and site was verified    Prep: patient was prepped and draped in usual sterile fashion    Local anesthesia used?: No      Details:  Needle Size:  25 G  Ultrasonic Guidance for needle placement?: Yes    Images are saved and documented.  Approach:  Posterior  Location:  Shoulder  Site:  L subacromial bursa  Medications:  40 mg methylPREDNISolone acetate 40 mg/mL  Patient tolerance:  Patient tolerated the procedure well with no immediate complications

## 2025-05-27 NOTE — ASSESSMENT & PLAN NOTE
Left subacromial bursa injection with corticosteroid.  Tolerated well.  See procedure note for details.

## 2025-05-28 ENCOUNTER — CLINICAL SUPPORT (OUTPATIENT)
Dept: REHABILITATION | Facility: HOSPITAL | Age: 80
End: 2025-05-28
Payer: MEDICARE

## 2025-05-28 DIAGNOSIS — M54.50 LUMBAR PAIN: ICD-10-CM

## 2025-05-28 DIAGNOSIS — M47.26 OSTEOARTHRITIS OF SPINE WITH RADICULOPATHY, LUMBAR REGION: Primary | ICD-10-CM

## 2025-05-28 PROCEDURE — 97110 THERAPEUTIC EXERCISES: CPT | Mod: PO

## 2025-06-04 ENCOUNTER — CLINICAL SUPPORT (OUTPATIENT)
Dept: REHABILITATION | Facility: HOSPITAL | Age: 80
End: 2025-06-04
Payer: MEDICARE

## 2025-06-04 DIAGNOSIS — M47.26 OSTEOARTHRITIS OF SPINE WITH RADICULOPATHY, LUMBAR REGION: Primary | ICD-10-CM

## 2025-06-04 DIAGNOSIS — M54.50 LUMBAR PAIN: ICD-10-CM

## 2025-06-04 PROCEDURE — 97110 THERAPEUTIC EXERCISES: CPT | Mod: PO

## 2025-06-24 DIAGNOSIS — M54.50 LUMBAR PAIN: ICD-10-CM

## 2025-06-24 DIAGNOSIS — M47.26 OSTEOARTHRITIS OF SPINE WITH RADICULOPATHY, LUMBAR REGION: ICD-10-CM

## 2025-06-24 RX ORDER — MELOXICAM 15 MG/1
15 TABLET ORAL DAILY PRN
Qty: 30 TABLET | Refills: 0 | Status: SHIPPED | OUTPATIENT
Start: 2025-06-24

## 2025-07-02 RX ORDER — HYDRALAZINE HYDROCHLORIDE 25 MG/1
25 TABLET, FILM COATED ORAL 3 TIMES DAILY
Qty: 90 TABLET | Refills: 2 | Status: SHIPPED | OUTPATIENT
Start: 2025-07-02

## (undated) DEVICE — MIXER BONE CEMENT

## (undated) DEVICE — ALCOHOL 70% ISOP RUBBING 4OZ

## (undated) DEVICE — TAPE SILK 3IN

## (undated) DEVICE — DRAIN SINGLE ROUND 3/16 15F

## (undated) DEVICE — SUT VICRYL 1 MO-4 18 CR/8

## (undated) DEVICE — BRUSH SCRUB HIBICLENS 4%

## (undated) DEVICE — CLOSURE SKIN STERI STRIP 1/2X4

## (undated) DEVICE — Device

## (undated) DEVICE — GLOVE SURG ULTRA TOUCH 7

## (undated) DEVICE — HOOK CEMENT BECHTOL**CALL IN**

## (undated) DEVICE — SUT MONOCRYL 3-0 PS-1

## (undated) DEVICE — GLOVE SURG ULTRA TOUCH 8

## (undated) DEVICE — SEE MEDLINE ITEM 107746

## (undated) DEVICE — GOWN X-LG STERILE BACK

## (undated) DEVICE — LINER SUCTION 3000CC

## (undated) DEVICE — PACK CUSTOM UNIV BASIN SLI

## (undated) DEVICE — BANDAGE ESMARK 6X12

## (undated) DEVICE — MANIFOLD 4 PORT

## (undated) DEVICE — CUBE COLD THERAPY POLAR CARE

## (undated) DEVICE — TUBE SUCTION YANKAUER HI CAP

## (undated) DEVICE — PACK BASIC

## (undated) DEVICE — SPONGE SUPER KERLIX 6X6.75IN

## (undated) DEVICE — SOL 9P NACL IRR PIC IL

## (undated) DEVICE — ELECTRODE REM PLYHSV RETURN 9

## (undated) DEVICE — INTERPULSE SET

## (undated) DEVICE — DRAPE INCISE IOBAN 2 23X17IN

## (undated) DEVICE — SUT VCRL 2-0 GYN 8-18IN MO4

## (undated) DEVICE — PAD ABD 8X10 STERILE

## (undated) DEVICE — EVACUATOR WOUND BULB 100CC

## (undated) DEVICE — UNDERGLOVES BIOGEL PI SIZE 8

## (undated) DEVICE — DRAPE STRL FLASHPAD DETECTOR

## (undated) DEVICE — PACK LOWER EXTREMITY

## (undated) DEVICE — UNDERGLOVES BIOGEL PI SZ 7 LF

## (undated) DEVICE — SEE MEDLINE ITEM 146231

## (undated) DEVICE — SEE MEDLINE ITEM 157166

## (undated) DEVICE — PAD CAST SPECIALIST STRL 6

## (undated) DEVICE — TOURNIQUET SB QC DP 34X4IN

## (undated) DEVICE — SUT #2 TI-CRON HGS-21 30IN

## (undated) DEVICE — TRAY DRY SPONGE SCRUB W FOAM

## (undated) DEVICE — SEE MEDLINE ITEM 152622

## (undated) DEVICE — BLADE ELECTRO EXTENDED.

## (undated) DEVICE — SOL IRR NACL .9% 3000ML

## (undated) DEVICE — SCRUB HIBICLENS 4% CHG 4OZ

## (undated) DEVICE — SEE MEDLINE ITEM 152530

## (undated) DEVICE — SEE MEDLINE ITEM 157131